# Patient Record
Sex: FEMALE | Race: BLACK OR AFRICAN AMERICAN | NOT HISPANIC OR LATINO | Employment: PART TIME | URBAN - METROPOLITAN AREA
[De-identification: names, ages, dates, MRNs, and addresses within clinical notes are randomized per-mention and may not be internally consistent; named-entity substitution may affect disease eponyms.]

---

## 2019-01-27 ENCOUNTER — OFFICE VISIT (OUTPATIENT)
Dept: URGENT CARE | Facility: URGENT CARE | Age: 24
End: 2019-01-27
Payer: COMMERCIAL

## 2019-01-27 VITALS
OXYGEN SATURATION: 99 % | WEIGHT: 94.5 LBS | TEMPERATURE: 97.2 F | DIASTOLIC BLOOD PRESSURE: 72 MMHG | SYSTOLIC BLOOD PRESSURE: 105 MMHG | HEART RATE: 92 BPM

## 2019-01-27 DIAGNOSIS — Z3A.08 8 WEEKS GESTATION OF PREGNANCY: Primary | ICD-10-CM

## 2019-01-27 DIAGNOSIS — R11.2 NAUSEA AND VOMITING, INTRACTABILITY OF VOMITING NOT SPECIFIED, UNSPECIFIED VOMITING TYPE: ICD-10-CM

## 2019-01-27 DIAGNOSIS — N39.0 URINARY TRACT INFECTION WITHOUT HEMATURIA, SITE UNSPECIFIED: ICD-10-CM

## 2019-01-27 LAB
ALBUMIN UR-MCNC: NEGATIVE MG/DL
AMORPH CRY #/AREA URNS HPF: ABNORMAL /HPF
APPEARANCE UR: ABNORMAL
BACTERIA #/AREA URNS HPF: ABNORMAL /HPF
BILIRUB UR QL STRIP: NEGATIVE
COLOR UR AUTO: YELLOW
GLUCOSE UR STRIP-MCNC: NEGATIVE MG/DL
HGB UR QL STRIP: NEGATIVE
KETONES UR STRIP-MCNC: NEGATIVE MG/DL
LEUKOCYTE ESTERASE UR QL STRIP: ABNORMAL
NITRATE UR QL: NEGATIVE
PH UR STRIP: 7.5 PH (ref 5–7)
RBC #/AREA URNS AUTO: ABNORMAL /HPF
SOURCE: ABNORMAL
SP GR UR STRIP: 1.01 (ref 1–1.03)
UROBILINOGEN UR STRIP-ACNC: 0.2 EU/DL (ref 0.2–1)
WBC #/AREA URNS AUTO: ABNORMAL /HPF

## 2019-01-27 PROCEDURE — 81001 URINALYSIS AUTO W/SCOPE: CPT | Performed by: PHYSICIAN ASSISTANT

## 2019-01-27 PROCEDURE — 87086 URINE CULTURE/COLONY COUNT: CPT | Performed by: PHYSICIAN ASSISTANT

## 2019-01-27 PROCEDURE — 99204 OFFICE O/P NEW MOD 45 MIN: CPT | Performed by: PHYSICIAN ASSISTANT

## 2019-01-27 RX ORDER — ONDANSETRON 4 MG/1
4 TABLET, FILM COATED ORAL EVERY 8 HOURS PRN
Qty: 12 TABLET | Refills: 0 | Status: SHIPPED | OUTPATIENT
Start: 2019-01-27 | End: 2019-02-06

## 2019-01-27 RX ORDER — CEFDINIR 300 MG/1
300 CAPSULE ORAL 2 TIMES DAILY
Qty: 14 CAPSULE | Refills: 0 | Status: SHIPPED | OUTPATIENT
Start: 2019-01-27 | End: 2019-02-06

## 2019-01-27 ASSESSMENT — PAIN SCALES - GENERAL: PAINLEVEL: MODERATE PAIN (4)

## 2019-01-28 NOTE — PROGRESS NOTES
SUBJECTIVE:  Chief Complaint   Patient presents with     Urgent Care     vomiting, dizziness and no appetite for almost one month.      Urgent Care     lower abdominal pain and noticed some blood in sputum for three days. Pt reports she is two months pregnant.      Olive Lima is a 23 year old female whose symptoms began nausea, vomiting and dizziness ago and include hx of UTI.    Symptoms are moderate .    Aggravating factors: hx of dizziness.    Alleviating factors:rest, fluids,   Associated symptoms:  Pain: mild dysuria  Fever: no fever  Appetite: decreaesd  Risk factors: nausea from pregnancy    Patient already had an ultrasound    PMH  Pregnancy    ALLERGIE  No Known Allergies     Social History     Tobacco Use     Smoking status: Never Smoker     Smokeless tobacco: Never Used   Substance Use Topics     Alcohol use: Not on file     Family HX  Anxiety  Allergies  GERD  HTN      ROS:  CONSTITUTIONAL:NEGATIVE for fever, chills, change in weight  INTEGUMENTARY/SKIN: NEGATIVE for worrisome rashes, moles or lesions  EYES: NEGATIVE for vision changes or irritation  ENT/MOUTH: NEGATIVE for ear, mouth and throat problems  RESP:NEGATIVE for significant cough or SOB  CV: NEGATIVE for chest pain, palpitations or peripheral edema  GI: POSITIVE for nausea and vomiting  : POSITIVE for dysuria and frequency  MUSCULOSKELETAL: NEGATIVE for significant arthralgias or myalgia  NEURO: NEGATIVE for weakness, dizziness or paresthesias    OBJECTIVE:  /72   Pulse 92   Temp 97.2  F (36.2  C) (Oral)   Wt 42.9 kg (94 lb 8 oz)   SpO2 99%   GENERAL APPEARANCE: healthy, alert and no distress  EYES: EOMI,  PERRL, conjunctiva clear  HENT: ear canals and TM's normal.  Nose and mouth without ulcers, erythema or lesions  NECK: supple, nontender, no lymphadenopathy  RESP: lungs clear to auscultation - no rales, rhonchi or wheezes  CV: regular rates and rhythm, normal S1 S2, no murmur noted  ABDOMEN:  soft, nontender, no HSM or  masses and bowel sounds normal  Extremities: no peripheral edema or tenderness, peripheral pulses normal  MS: extremities normal- no gross deformities noted, no erythema, FROM noted in all extremities  NEURO: Normal strength and tone, sensory exam grossly normal,  normal speech and mentation  SKIN: no suspicious lesions or rashes    Results for orders placed or performed in visit on 01/27/19   UA with Microscopic reflex to Culture   Result Value Ref Range    Color Urine Yellow     Appearance Urine Cloudy     Glucose Urine Negative NEG^Negative mg/dL    Bilirubin Urine Negative NEG^Negative    Ketones Urine Negative NEG^Negative mg/dL    Specific Gravity Urine 1.015 1.003 - 1.035    pH Urine 7.5 (H) 5.0 - 7.0 pH    Protein Albumin Urine Negative NEG^Negative mg/dL    Urobilinogen Urine 0.2 0.2 - 1.0 EU/dL    Nitrite Urine Negative NEG^Negative    Blood Urine Negative NEG^Negative    Leukocyte Esterase Urine Trace (A) NEG^Negative    Source Midstream Urine     WBC Urine 0 - 5 OTO5^0 - 5 /HPF    RBC Urine O - 2 OTO2^O - 2 /HPF    Bacteria Urine Few (A) NEG^Negative /HPF    Amorphous Crystals Moderate (A) NEG^Negative /HPF       ASSESSMENT/PLAN      ICD-10-CM    1. 8 weeks gestation of pregnancy Z3A.08 ondansetron (ZOFRAN) 4 MG tablet   2. Nausea and vomiting, intractability of vomiting not specified, unspecified vomiting type R11.2 UA with Microscopic reflex to Culture   3. Urinary tract infection without hematuria, site unspecified N39.0 Urine Culture Aerobic Bacterial     cefdinir (OMNICEF) 300 MG capsule       Encounter Diagnoses   Name Primary?     8 weeks gestation of pregnancy Yes     Nausea and vomiting, intractability of vomiting not specified, unspecified vomiting type      Urinary tract infection without hematuria, site unspecified        PLAN:  Diet: dilute gatorade, BRAT diet, advance diet as tolerated and small amounts clear fluids frequently,soups,juices,water,advance diet as tolerated  Orders Placed This  Encounter     UA with Microscopic reflex to Culture     ondansetron (ZOFRAN) 4 MG tablet     cefdinir (OMNICEF) 300 MG capsule       Urine culture pending  Patient needs to follow up with GYN  Follow-up with PCP if not improving

## 2019-01-29 LAB
BACTERIA SPEC CULT: NORMAL
SPECIMEN SOURCE: NORMAL

## 2019-02-06 ENCOUNTER — HOSPITAL ENCOUNTER (EMERGENCY)
Facility: CLINIC | Age: 24
Discharge: HOME | End: 2019-02-06
Attending: EMERGENCY MEDICINE | Admitting: EMERGENCY MEDICINE
Payer: COMMERCIAL

## 2019-02-06 ENCOUNTER — PRENATAL OFFICE VISIT (OUTPATIENT)
Dept: OBGYN | Facility: CLINIC | Age: 24
End: 2019-02-06
Payer: COMMERCIAL

## 2019-02-06 ENCOUNTER — APPOINTMENT (OUTPATIENT)
Dept: ULTRASOUND IMAGING | Facility: CLINIC | Age: 24
End: 2019-02-06
Attending: EMERGENCY MEDICINE
Payer: COMMERCIAL

## 2019-02-06 ENCOUNTER — ALLIED HEALTH/NURSE VISIT (OUTPATIENT)
Dept: NURSING | Facility: CLINIC | Age: 24
End: 2019-02-06
Payer: COMMERCIAL

## 2019-02-06 VITALS
RESPIRATION RATE: 12 BRPM | TEMPERATURE: 97.9 F | DIASTOLIC BLOOD PRESSURE: 68 MMHG | SYSTOLIC BLOOD PRESSURE: 110 MMHG | HEART RATE: 85 BPM | OXYGEN SATURATION: 100 %

## 2019-02-06 VITALS
HEART RATE: 108 BPM | DIASTOLIC BLOOD PRESSURE: 60 MMHG | BODY MASS INDEX: 14.2 KG/M2 | HEIGHT: 67 IN | WEIGHT: 90.5 LBS | SYSTOLIC BLOOD PRESSURE: 98 MMHG

## 2019-02-06 VITALS — BODY MASS INDEX: 15.13 KG/M2 | WEIGHT: 95.19 LBS | DIASTOLIC BLOOD PRESSURE: 70 MMHG | SYSTOLIC BLOOD PRESSURE: 100 MMHG

## 2019-02-06 DIAGNOSIS — R81 GLUCOSE FOUND IN URINE ON EXAMINATION: Primary | ICD-10-CM

## 2019-02-06 DIAGNOSIS — Z34.00 SUPERVISION OF NORMAL FIRST PREGNANCY: Primary | ICD-10-CM

## 2019-02-06 DIAGNOSIS — O21.0 HYPEREMESIS GRAVIDARUM: ICD-10-CM

## 2019-02-06 DIAGNOSIS — E87.6 HYPOKALEMIA: ICD-10-CM

## 2019-02-06 LAB
ALBUMIN SERPL-MCNC: 4.3 G/DL (ref 3.4–5)
ALBUMIN UR-MCNC: NEGATIVE MG/DL
ALP SERPL-CCNC: 52 U/L (ref 40–150)
ALT SERPL W P-5'-P-CCNC: 16 U/L (ref 0–50)
ANION GAP SERPL CALCULATED.3IONS-SCNC: 13 MMOL/L (ref 3–14)
APPEARANCE UR: CLEAR
AST SERPL W P-5'-P-CCNC: 15 U/L (ref 0–45)
BILIRUB SERPL-MCNC: 0.5 MG/DL (ref 0.2–1.3)
BILIRUB UR QL STRIP: NEGATIVE
BUN SERPL-MCNC: 9 MG/DL (ref 7–30)
CALCIUM SERPL-MCNC: 8.9 MG/DL (ref 8.5–10.1)
CHLORIDE SERPL-SCNC: 101 MMOL/L (ref 94–109)
CO2 SERPL-SCNC: 20 MMOL/L (ref 20–32)
COLOR UR AUTO: ABNORMAL
CREAT SERPL-MCNC: 0.52 MG/DL (ref 0.52–1.04)
ERYTHROCYTE [DISTWIDTH] IN BLOOD BY AUTOMATED COUNT: 11.8 % (ref 10–15)
GFR SERPL CREATININE-BSD FRML MDRD: >90 ML/MIN/{1.73_M2}
GLUCOSE SERPL-MCNC: 92 MG/DL (ref 70–99)
GLUCOSE UR STRIP-MCNC: >499 MG/DL
HCT VFR BLD AUTO: 39.1 % (ref 35–47)
HGB BLD-MCNC: 13.6 G/DL (ref 11.7–15.7)
HGB UR QL STRIP: NEGATIVE
KETONES UR STRIP-MCNC: 20 MG/DL
LEUKOCYTE ESTERASE UR QL STRIP: NEGATIVE
LIPASE SERPL-CCNC: 246 U/L (ref 73–393)
MCH RBC QN AUTO: 27.8 PG (ref 26.5–33)
MCHC RBC AUTO-ENTMCNC: 34.8 G/DL (ref 31.5–36.5)
MCV RBC AUTO: 80 FL (ref 78–100)
NITRATE UR QL: NEGATIVE
PH UR STRIP: 6 PH (ref 5–7)
PLATELET # BLD AUTO: 197 10E9/L (ref 150–450)
POTASSIUM SERPL-SCNC: 2.9 MMOL/L (ref 3.4–5.3)
PROT SERPL-MCNC: 8.1 G/DL (ref 6.8–8.8)
RBC # BLD AUTO: 4.9 10E12/L (ref 3.8–5.2)
RBC #/AREA URNS AUTO: <1 /HPF (ref 0–2)
SODIUM SERPL-SCNC: 134 MMOL/L (ref 133–144)
SOURCE: ABNORMAL
SP GR UR STRIP: 1.01 (ref 1–1.03)
SQUAMOUS #/AREA URNS AUTO: 2 /HPF (ref 0–1)
UROBILINOGEN UR STRIP-MCNC: 0 MG/DL (ref 0–2)
WBC # BLD AUTO: 4.3 10E9/L (ref 4–11)
WBC #/AREA URNS AUTO: 1 /HPF (ref 0–5)

## 2019-02-06 PROCEDURE — 96361 HYDRATE IV INFUSION ADD-ON: CPT

## 2019-02-06 PROCEDURE — 81001 URINALYSIS AUTO W/SCOPE: CPT | Performed by: EMERGENCY MEDICINE

## 2019-02-06 PROCEDURE — 25000128 H RX IP 250 OP 636: Performed by: EMERGENCY MEDICINE

## 2019-02-06 PROCEDURE — 96375 TX/PRO/DX INJ NEW DRUG ADDON: CPT

## 2019-02-06 PROCEDURE — 99207 ZZC NO CHARGE NURSE ONLY: CPT

## 2019-02-06 PROCEDURE — 83690 ASSAY OF LIPASE: CPT | Performed by: EMERGENCY MEDICINE

## 2019-02-06 PROCEDURE — 99203 OFFICE O/P NEW LOW 30 MIN: CPT | Performed by: OBSTETRICS & GYNECOLOGY

## 2019-02-06 PROCEDURE — 25000131 ZZH RX MED GY IP 250 OP 636 PS 637: Performed by: EMERGENCY MEDICINE

## 2019-02-06 PROCEDURE — 25800025 ZZH RX 258: Performed by: EMERGENCY MEDICINE

## 2019-02-06 PROCEDURE — 76801 OB US < 14 WKS SINGLE FETUS: CPT

## 2019-02-06 PROCEDURE — 99285 EMERGENCY DEPT VISIT HI MDM: CPT | Mod: 25

## 2019-02-06 PROCEDURE — 25000132 ZZH RX MED GY IP 250 OP 250 PS 637: Performed by: EMERGENCY MEDICINE

## 2019-02-06 PROCEDURE — 96365 THER/PROPH/DIAG IV INF INIT: CPT

## 2019-02-06 PROCEDURE — 85027 COMPLETE CBC AUTOMATED: CPT | Performed by: EMERGENCY MEDICINE

## 2019-02-06 PROCEDURE — 80053 COMPREHEN METABOLIC PANEL: CPT | Performed by: EMERGENCY MEDICINE

## 2019-02-06 RX ORDER — POTASSIUM CHLORIDE 1500 MG/1
40 TABLET, EXTENDED RELEASE ORAL ONCE
Status: COMPLETED | OUTPATIENT
Start: 2019-02-06 | End: 2019-02-06

## 2019-02-06 RX ORDER — PROMETHAZINE HYDROCHLORIDE 25 MG/1
25 SUPPOSITORY RECTAL EVERY 6 HOURS PRN
Qty: 24 SUPPOSITORY | Refills: 1 | Status: SHIPPED | OUTPATIENT
Start: 2019-02-06 | End: 2019-07-19

## 2019-02-06 RX ORDER — ONDANSETRON 4 MG/1
8 TABLET, ORALLY DISINTEGRATING ORAL ONCE
Status: COMPLETED | OUTPATIENT
Start: 2019-02-06 | End: 2019-02-06

## 2019-02-06 RX ORDER — MECLIZINE HYDROCHLORIDE 25 MG/1
25 TABLET ORAL 3 TIMES DAILY PRN
Qty: 25 TABLET | Refills: 1 | Status: SHIPPED | OUTPATIENT
Start: 2019-02-06 | End: 2019-07-19

## 2019-02-06 RX ORDER — PYRIDOXINE HCL (VITAMIN B6) 25 MG
25 TABLET ORAL DAILY
Qty: 30 TABLET | Refills: 0 | Status: SHIPPED | OUTPATIENT
Start: 2019-02-06 | End: 2019-07-19

## 2019-02-06 RX ORDER — METOCLOPRAMIDE HYDROCHLORIDE 5 MG/ML
10 INJECTION INTRAMUSCULAR; INTRAVENOUS ONCE
Status: COMPLETED | OUTPATIENT
Start: 2019-02-06 | End: 2019-02-06

## 2019-02-06 RX ORDER — DIPHENHYDRAMINE HYDROCHLORIDE 50 MG/ML
25 INJECTION INTRAMUSCULAR; INTRAVENOUS ONCE
Status: COMPLETED | OUTPATIENT
Start: 2019-02-06 | End: 2019-02-06

## 2019-02-06 RX ORDER — METOCLOPRAMIDE 10 MG/1
10 TABLET ORAL 4 TIMES DAILY PRN
Qty: 20 TABLET | Refills: 0 | Status: SHIPPED | OUTPATIENT
Start: 2019-02-06 | End: 2019-07-19

## 2019-02-06 RX ORDER — POTASSIUM CL/LIDO/0.9 % NACL 10MEQ/0.1L
10 INTRAVENOUS SOLUTION, PIGGYBACK (ML) INTRAVENOUS
Status: DISCONTINUED | OUTPATIENT
Start: 2019-02-06 | End: 2019-02-06 | Stop reason: HOSPADM

## 2019-02-06 RX ORDER — POTASSIUM CHLORIDE 1.5 G/1.58G
20 POWDER, FOR SOLUTION ORAL 2 TIMES DAILY
Qty: 8 PACKET | Refills: 0 | Status: SHIPPED | OUTPATIENT
Start: 2019-02-06 | End: 2019-07-19

## 2019-02-06 RX ADMIN — METOCLOPRAMIDE 10 MG: 5 INJECTION, SOLUTION INTRAMUSCULAR; INTRAVENOUS at 09:42

## 2019-02-06 RX ADMIN — Medication 10 MEQ: at 11:57

## 2019-02-06 RX ADMIN — POTASSIUM CHLORIDE 40 MEQ: 1500 TABLET, EXTENDED RELEASE ORAL at 12:03

## 2019-02-06 RX ADMIN — SODIUM CHLORIDE 1000 ML: 9 INJECTION, SOLUTION INTRAVENOUS at 09:41

## 2019-02-06 RX ADMIN — DEXTROSE AND SODIUM CHLORIDE 1000 ML: 5; 450 INJECTION, SOLUTION INTRAVENOUS at 09:49

## 2019-02-06 RX ADMIN — ONDANSETRON 8 MG: 4 TABLET, ORALLY DISINTEGRATING ORAL at 13:56

## 2019-02-06 RX ADMIN — DIPHENHYDRAMINE HYDROCHLORIDE 25 MG: 50 INJECTION, SOLUTION INTRAMUSCULAR; INTRAVENOUS at 09:41

## 2019-02-06 SDOH — HEALTH STABILITY: MENTAL HEALTH: HOW OFTEN DO YOU HAVE A DRINK CONTAINING ALCOHOL?: NEVER

## 2019-02-06 ASSESSMENT — ENCOUNTER SYMPTOMS
DIZZINESS: 1
ABDOMINAL PAIN: 1
NAUSEA: 1
VOMITING: 1

## 2019-02-06 ASSESSMENT — MIFFLIN-ST. JEOR: SCORE: 1185.2

## 2019-02-06 NOTE — PROGRESS NOTES
SUBJECTIVE:                                                   Olive Lima is a 23 year old female who presents to clinic today for the following health issue(s):  Patient presents with:  Prenatal Care: possibly needs infusions for severe hyperemesis     present: sana Rosenbaum, from [place]    HPI:  Patient 8w6d today based on LMP confirmed by ultrasound in ED today. She had her OB nursing intake visit this AM.   Was seen in the ED today for hyperemesis. Lipase, CBC, and CMP within normal limits with exception of Potassium (2.9).   States she is unable to hold food down. Able to take some sips of water. Vomits ~5x/day.   is picking up her prescriptions from the ED (doxylamine, pyridoxine, Reglan).   Patient has concerns with taking oral medication given her nausea.       Problem list and histories reviewed & adjusted, as indicated.  Additional history: as documented.    There is no problem list on file for this patient.    Past Surgical History:   Procedure Laterality Date     NO HISTORY OF SURGERY        Social History     Tobacco Use     Smoking status: Never Smoker     Smokeless tobacco: Never Used   Substance Use Topics     Alcohol use: No     Frequency: Never             No current outpatient medications on file prior to visit.  No current facility-administered medications on file prior to visit.   No Known Allergies    ROS:  5 point ROS negative except as noted above in HPI, including Gen., Resp., CV, GI &  system review.    OBJECTIVE:     /70   Wt 43.2 kg (95 lb 3 oz)   LMP 12/06/2018 (Exact Date)   BMI 15.13 kg/m     BMI: Body mass index is 15.13 kg/m .  General: Alert and oriented, no distress. Very thin appearing  Psychiatric: Mood and affect within normal limits.  Skin: Warm and dry, no lesions, rashes or discolorations.  Musculoskeletal: extremities normal    In-Clinic Test Results:  Results for orders placed or performed during the hospital encounter of 02/06/19  (from the past 24 hour(s))   CBC (platelets, no diff)   Result Value Ref Range    WBC 4.3 4.0 - 11.0 10e9/L    RBC Count 4.90 3.8 - 5.2 10e12/L    Hemoglobin 13.6 11.7 - 15.7 g/dL    Hematocrit 39.1 35.0 - 47.0 %    MCV 80 78 - 100 fl    MCH 27.8 26.5 - 33.0 pg    MCHC 34.8 31.5 - 36.5 g/dL    RDW 11.8 10.0 - 15.0 %    Platelet Count 197 150 - 450 10e9/L   Comprehensive metabolic panel   Result Value Ref Range    Sodium 134 133 - 144 mmol/L    Potassium 2.9 (L) 3.4 - 5.3 mmol/L    Chloride 101 94 - 109 mmol/L    Carbon Dioxide 20 20 - 32 mmol/L    Anion Gap 13 3 - 14 mmol/L    Glucose 92 70 - 99 mg/dL    Urea Nitrogen 9 7 - 30 mg/dL    Creatinine 0.52 0.52 - 1.04 mg/dL    GFR Estimate >90 >60 mL/min/[1.73_m2]    GFR Estimate If Black >90 >60 mL/min/[1.73_m2]    Calcium 8.9 8.5 - 10.1 mg/dL    Bilirubin Total 0.5 0.2 - 1.3 mg/dL    Albumin 4.3 3.4 - 5.0 g/dL    Protein Total 8.1 6.8 - 8.8 g/dL    Alkaline Phosphatase 52 40 - 150 U/L    ALT 16 0 - 50 U/L    AST 15 0 - 45 U/L   Lipase   Result Value Ref Range    Lipase 246 73 - 393 U/L   US OB < 14 Weeks Single    Narrative    ULTRASOUND OBSTETRIC LESS THAN FOURTEEN WEEKS SINGLE  2/6/2019 10:29  AM    HISTORY: Low abdominal pain during early pregnancy. Nausea and  vomiting.    TECHNIQUE: Transabdominal imaging only was performed.    COMPARISON:  None.    FINDINGS:    Estimated gestational age by current ultrasound measurement: 9 weeks 4  days.  Estimated date of delivery based on this ultrasound: 7/9/2019.  Patient reported LMP: 12/6/2018.  Estimated gestational age by reported LMP: 8 weeks 6 days.    Crown-rump length: 2.8 cm.   Embryonic cardiac activity: 179 bpm.   Yolk sac: Present.  Subchorionic hemorrhage: None.    Right ovary: Not visualized.  Left ovary: Not visualized.  Adnexal mass: None.  Free pelvic fluid: None.      Impression    IMPRESSION: Single live intrauterine pregnancy of estimated  gestational age 9 weeks 4 days.    NOLVIA DAVIS MD   UA with  Microscopic   Result Value Ref Range    Color Urine Straw     Appearance Urine Clear     Glucose Urine >499 (A) NEG^Negative mg/dL    Bilirubin Urine Negative NEG^Negative    Ketones Urine 20 (A) NEG^Negative mg/dL    Specific Gravity Urine 1.015 1.003 - 1.035    Blood Urine Negative NEG^Negative    pH Urine 6.0 5.0 - 7.0 pH    Protein Albumin Urine Negative NEG^Negative mg/dL    Urobilinogen mg/dL 0.0 0.0 - 2.0 mg/dL    Nitrite Urine Negative NEG^Negative    Leukocyte Esterase Urine Negative NEG^Negative    Source Unspecified Urine     WBC Urine 1 0 - 5 /HPF    RBC Urine <1 0 - 2 /HPF    Squamous Epithelial /HPF Urine 2 (H) 0 - 1 /HPF       ASSESSMENT/PLAN:                                                        ICD-10-CM    1. Glucose found in urine on examination R81 Hemoglobin A1c   2. Hyperemesis gravidarum O21.0 promethazine (PHENERGAN) 25 MG suppository     meclizine (ANTIVERT) 25 MG tablet   3. BMI less than 19,adult Z68.1        Noted that she is spilling ketones and glucose in her urine collected in the ED. Will obtain A1c with prenatal labs.  Consider early glucola when no longer vomiting.    [place] pregnancy base on ultrasound in ED today.  Recommend trying Phenergen 25mg suppository every 6 hours rather than oral medications. Also provided Meclizine for the evening. Okay to try Reglan however would not take Reglan and Phenergan together. This was discussed.   In the ED, she was started on Potassium chloride 20 mEq, Oral, 2 TIMES DAILY -- continue.    Will call patient tomorrow to assess effectiveness of current plan.   Will set her up with the infusion center for IV fluids and electrolyte replacement, IV antiemetics if not improved.  ----------------------------------------    Update: Patient called. She is improved on Phenergan suppositories and would like to hold on infusions at this time.     Next appointment 2/21. Patient to call if continues to have emesis, inability to hold down fluids.      Keren Hedrick, [facility]

## 2019-02-06 NOTE — PATIENT INSTRUCTIONS
Early Pregnancy Information:     Rest  Your body requires more sleep in early pregnancy. Try to get plenty of sleep at night or take a short nap during the day. Being tired can often trigger nausea. If your nausea is worse in the evening, try taking a nap before dinner.     Exercise  Energy levels are normally low in early pregnancy and exercise may be the last thing on your mind, but getting out and walking briskly for 30 minutes each day will increase metabolism, relieve stress and psychologically improve your outlook. Walking after meals can improve heartburn, constipation, and indigestion.   - You can generally continue the same exercise routine in early pregnancy that you were doing prior to pregnancy. If you were not getting daily exercise before, now is a great time to start by walking or biking for 30 minutes daily.  - Any exercise that causes cramping, bleeding, or pain needs to be stopped right away. Please report to your doctor.     [place]  It is safe to continue sexual activity in pregnancy. If you experience bleeding or pain with intercourse, please abstain until you are able to discuss this with your doctor.         Nausea & Vomiting  Women experience this problem in varying degrees during pregnancy and you may have different experiences in subsequent pregnancies. Some women experience  morning sickness,  but it is also common to experience nausea any time throughout the day.  Listen to your body and eat the kinds of foods that make you feel best.  Suggestions for Diet    The most important rule is to eat small amounts often - even if you are not hungry. Try not to go more than three hours without eating during the day or ten hours at night. An empty stomach triggers nausea.     Eat slowly and avoid foods that are spicy or high in fat. These are difficult to digest. Do not overfill your stomach.     Drink fruit juices, water and milk between meals.     Eat a few crackers, dry toast or vanilla  wafers before getting out of bed in the morning. Stay in bed 15-20 minutes after eating.    Give yourself extra time in the morning.     Do not brush your teeth until you have been up for a while.     Do not skip breakfast.     Have a snack at bedtime that includes both carbohydrates and protein, e.g., peanut butter toast or hot chocolate made with milk.    A specific food or drink may trigger nausea in one woman and alleviate it in another. Find out what works best for you and eliminate the foods that cause nausea.     Most women tolerate ice cold drinks and foods best. Sherbet and fruit juices are good examples.     Try avoid coffee and products containing caffeine; it increases stomach acid. About 1 cup of coffee daily is considered safe in pregnancy, but this may be triggering your nausea.    Avoid smoking: it also increases stomach acid.     Vitamins    Vitamins B6 and Vitamin C may help improve nausea. There have been no definite studies to prove this effective, but some women do improve.     To prevent nausea take: 25 mg of Vitamin B6 daily. You can take this up to 3 times daily. You may have to cut a tablet in half to get to 25mg     Take: 500 mg. Vitamin C daily.     Yogurt is a good source of the B Vitamins.     If taking your prenatal vitamin increases or causes nausea, stop for 7-10 days, then try again. You can also try switching to a formulation without iron in early pregnancy (a women's once daily vitamin).   Medication and other remedies    Unisom, taken as directed, may be used with Vitamin B6. Take 25mg of unisom at night, this can make you drowsy.     [place] tablets, 250 mg, 2-4 times per day     Acupressure Wrist Bands (Sea Bands)     Peppermint or [place]ol[place] decaffeinated tea     Peppermint gum or candy  Inform your doctor if:    You cannot keep any solid food down for 24 hours.     You cannot keep liquids down.     You are losing weight.     You are running a temperature greater than 100.4   F.     Common Ailments:  Below is a list of medications that are safe to take in pregnancy. This is not everything that is safe, but merely a list of over the counter options to get you through frequently experienced symptoms.      Upper Respiratory Infections:  Sinus congestion and colds - Plain Sudafed, Tylenol Sinus  Antihistamines -  Claritin, Benadryl, Zyrtec  Avoid nasal sprays, except for Saline only.  Sore Throat:  Lozenges - Cepacol, Chloraseptic  Cough drops - Halls, Vicks, or lemon drops     Headache, Pain, or Fever:  Tylenol or other acetaminophen products: 650-1000 mg every 6-8 hours (up to 3 gm/24 hours) as needed.   A single serving of caffeine   Increase fluid consumption.  Try a short nap. If not relieved, call the office.      Heartburn:  Sodium-free antacids - Gaviscon, Maalox, Mylanta, Tums  Acid Reflux - Prilosec, Zantac 75 mg 2 times daily  Gas: Simethicone products - Gas-X     Constipation:  Fiber supplements - Fibercon, Metamucil (powder, capsules, or wafers)  Stool softeners - Colace (Docusate Sodium),   Laxatives -Milk of Magnesia, Senna, Miralax  Diarrhea:  Imodium, Imodium AD  Avoid dairy products and stop prenatal vitamins until diarrhea subsides. If not resolved in 24-36 hours, call the office.     Insect Bites and Rashes:  Lotions - Calamine, Caladryl, Hydrocortisone 1%, DEET bug spray  Sprays - DEET bug spray  If rash is unusual or persists, call the office.     Hemorrhoids:  Preparation H, Anusol, Tucks pads        Call the clinic ([facility] [phone], [place] [phone]) right away with any of the following concerns. These phones are answered at all hours:     1.   Severe abdominal pain or cramping, that does not go away with rest and hydration     2.   Vaginal bleeding.        Continue your prenatal vitamins daily.

## 2019-02-06 NOTE — ED TRIAGE NOTES
Patient presents with nausea and vomiting for about a month. Patient is 8 weeks pregnant, hasn't been able to keep anything down, slight abdominal cramping and dizziness. ABCDs intact, alert and oriented x 4.

## 2019-02-06 NOTE — NURSING NOTE
"Chief Complaint   Patient presents with     Prenatal Care     possibly needs infusions for severe hyperemesis       Initial /70   Wt 43.2 kg (95 lb 3 oz)   LMP 2018 (Exact Date)   BMI 15.13 kg/m   Estimated body mass index is 15.13 kg/m  as calculated from the following:    Height as of an earlier encounter on 19: 1.689 m (5' 6.5\").    Weight as of this encounter: 43.2 kg (95 lb 3 oz).  BP completed using cuff size: regular    Questioned patient about current smoking habits.  Pt. has never smoked.          The following HM Due: NONE    Severe hyperemesis, cannot eat or drink anything.    Modesta Rosa, CMA      "

## 2019-02-06 NOTE — PROGRESS NOTES
Pt attended a NPN one on one nurse visit.  Pt is , 8w6d GA based off of LMP.  Pt is here with her  who is interpreting.  She is clearly feeling nauseous and states she has not been able to keep food or fluids down since she found out she was pregnant.  She states she feels dizzy and fatigued.   said it has been getting worse despite the use of zofran from the urgent care.  I did recommend that she report to the ED for IV fluids.  They want to see a doctor today as her  has the day off and can drive. I suspect she may have order for more IV fluids from an MD.  Added to schedule for this afternoon, they will call if they are still at the ED.    Pt's occupation: homemaker  Pt c/o: N&V, dizziness   Advised: ED  Prev hx of : none  Hx of pregnancy complications: n/a  Hx of delivery complications: N/a    They are unsure of last pap, it sounds like she may have never had one before.    Hx of abnml pap: none    Went over all information as listed in checklist for 6-12 weeks as well as clinic info and signs/sx to call for.     Dianna MEDLEY R.N.  [facility]

## 2019-02-06 NOTE — ED PROVIDER NOTES
History     Chief Complaint:  Nausea & Vomiting      A  was used.      Olive Lima is a 23 year old female , approximately 8 weeks gestation, who presents with her significant other for evaluation of nausea and vomiting and some abdominal pain. . The patient reports that she started to feel dizziness with this pregnancy. She states that the last day of her menstrual cycle was in the first week of December. She states that she vomits every time she eats and she vomits a lot approximating about 5 a day. She is unable to keep any fluids down at home. The patient has been taking Zofran which has not helped. She is currently feeling dizzy, but also complains of some diffuse upper abdominal pain. The pain is related to the nausea and vomiting.  She has only seen a Gynecologist once but the family have their contact. She had an Ultrasound done in [place] a few months ago but it is uncertain whether this was done prior to this pregnancy.    Allergies:  No known drug allergies.     Medications:    The patient is currently on no regular medications.     Past Medical History:    History reviewed.  No significant past medical history.      Past Surgical History:    History reviewed. No pertinent past surgical history.     Family History:    History reviewed. No pertinent family history.     Social History:  Marital Status:  Single   Smoking status: No   Alcohol use: No          Review of Systems   Gastrointestinal: Positive for abdominal pain, nausea and vomiting.   Neurological: Positive for dizziness.   All other systems reviewed and are negative.      Physical Exam     Vital signs  Patient Vitals for the past 24 hrs:   BP Temp Temp src Pulse Heart Rate Resp SpO2   19 1230 110/68 -- -- 85 -- -- 100 %   19 1215 108/68 -- -- 85 -- -- 100 %   19 1200 119/80 -- -- 85 -- -- 100 %   19 1145 119/85 -- -- 75 -- -- 100 %   19 1130 119/87 -- -- 83 -- -- 100 %   19 1000  114/79 -- -- 79 -- -- 100 %   02/06/19 0945 -- -- -- 107 -- -- 99 %   02/06/19 0930 107/85 -- -- 90 -- -- 98 %   02/06/19 0915 108/77 -- -- 86 -- -- 99 %   02/06/19 0905 116/79 97.9  F (36.6  C) Oral -- 134 12 99 %          Physical Exam    Constitutional:  Pleasant, age appropriate.       Resting comfortably in the bed.  HEENT:    Oropharynx is moist  Eyes:    Conjunctiva normal  Neck:    Supple, no meningismus.     CV:     Regular rate and rhythm.      No murmurs, rubs or gallops.     No lower extremity edema.  PULM:    Clear to auscultation bilateral.       No respiratory distress.      Good air exchange.  ABD:    Soft, non-distended.       Mild tenderness in the epigastrium.     Uterine fundus not palpable     Bowel sounds normal.     No pulsatile masses.       No rebound, guarding or rigidity.     No CVA tenderness.   MSK:     No gross deformity to all four extremities.   LYMPH:   No cervical lymphadenopathy.  NEURO:   Alert.  Good muscular tone, no atrophy.   Skin:    Warm, dry and intact.    Psych:    Mood is good and affect is appropriate.      Emergency Department Course   Imaging:  US OB < 14 Weeks Single   Final Result   IMPRESSION: Single live intrauterine pregnancy of estimated   gestational age 9 weeks 4 days.      NOLVIA DAVIS MD      Results as read by radiology.   I communicated the results of the imaging studies with the patient who expressed understanding of these findings.      Laboratory:  UA: Urine Glc >499, Urine [place]eton 20, Squamous 2, otherwise within normal limits   CBC: WNL   CMP: K 2.9, otherwise within normal limits   Lipase: 246     Interventions:  0941: Benadryl 25 mg  0942: Reglan 10 mg   0942: NS 1L IV Bolus   0949: D50%   1157: KCl 10mEq  1203: KCl 40 mEq  1315: KCl 40 mEq       Emergency Department Course:  Past medical records, nursing notes, and vitals reviewed.  0920: I performed an exam of the patient and obtained history, as documented above.       The patient was sent for  an Ultra Sound while in the emergency department, findings above.    IV inserted and blood drawn for the above work up to be conducted.     1330: Rechecked the patient, findings and plan explained to the patient. Patient discharged home, status improved, with instructions regarding supportive care, medications, and reasons to return as well as the importance of close follow-up was reviewed.    Impression & Plan      Medical Decision Makin-year-old female  at 8 weeks presents the ED with primary complaints of nausea and vomiting.  Findings are consistent with hyperemesis gravidarum.  She has evidence of hypokalemia for which she was given IV and oral supplementation.  After antiemetics and IV fluids, she feels remarkably improved.  She is able to tolerate a p.o. challenge.  Patient will be discharged home on pyridoxine, doxylamine and Reglan.  Close follow-up with OB/GYN physician for further evaluation and management.    She had mild associated upper abdominal discomfort.  Again laboratory studies are reassuring.  No concerning features for intra-abdominal catastrophe.  She has no features to draw concern for vaginitis, cervicitis or PID.  Abdominal discomfort is likely related to the hyperemesis gravidarum.  Patient safe for discharge home.    Diagnosis:    ICD-10-CM    1. Hyperemesis gravidarum O21.0 UA with Microscopic     UA with Microscopic   2. Hypokalemia E87.6        Disposition:  discharged to home    Discharge Medications:     Medication List      Started    doxylamine 25 MG Tabs tablet  Commonly known as:  UNISOM  25 mg, Oral, AT BEDTIME     metoclopramide 10 MG tablet  Commonly known as:  REGLAN  10 mg, Oral, 4 TIMES DAILY PRN     potassium chloride 20 MEQ packet  Commonly known as:  KLOR-CON  20 mEq, Oral, 2 TIMES DAILY     vitamin B6 25 MG tablet  Commonly known as:  pyridOXINE  25 mg, Oral, DAILY          Mateo GARRETT am serving as a scribe at 9:21 AM on 2019 to document services  personally performed by Robin Giordano MD based on my observations and the provider's statements to me.    [facility] EMERGENCY DEPARTMENT       Robin Giordano MD  02/06/19 [id number]

## 2019-02-06 NOTE — ED NOTES
Patient had an episode of vomiting after discharge, MD notified hold on discharge and give Zofran. Will continue to monitor.

## 2019-02-06 NOTE — ED AVS SNAPSHOT
[facility] Emergency Department  [address]  [facility] [address]  Phone:  [phone]  Fax:  [phone]                                    Olive Lima   MRN: [id number]    Department:  [facility] Emergency Department   Date of Visit:  2/6/2019           After Visit Summary Signature Page    I have received my discharge instructions, and my questions have been answered. I have discussed any challenges I see with this plan with the nurse or doctor.    ..........................................................................................................................................  Patient/Patient Representative Signature      ..........................................................................................................................................  Patient Representative Print Name and Relationship to Patient    ..................................................               ................................................  Date                                   Time    ..........................................................................................................................................  Reviewed by Signature/Title    ...................................................              ..............................................  Date                                               Time          22EPIC Rev 08/18

## 2019-02-06 NOTE — ED NOTES
Provided pt with water and alvin crackers for PO challenge at 1128. Pt ambulated to the bathroom. Gait steady. No complaints of dizziness or SOB.

## 2019-02-07 PROBLEM — O21.0 HYPEREMESIS GRAVIDARUM: Status: ACTIVE | Noted: 2019-02-07

## 2019-02-21 ENCOUNTER — PRENATAL OFFICE VISIT (OUTPATIENT)
Dept: OBGYN | Facility: CLINIC | Age: 24
End: 2019-02-21
Payer: COMMERCIAL

## 2019-02-21 VITALS — DIASTOLIC BLOOD PRESSURE: 56 MMHG | BODY MASS INDEX: 15.1 KG/M2 | WEIGHT: 95 LBS | SYSTOLIC BLOOD PRESSURE: 102 MMHG

## 2019-02-21 DIAGNOSIS — Z11.3 SCREEN FOR STD (SEXUALLY TRANSMITTED DISEASE): ICD-10-CM

## 2019-02-21 DIAGNOSIS — O21.0 HYPEREMESIS GRAVIDARUM: ICD-10-CM

## 2019-02-21 DIAGNOSIS — Z34.00 SUPERVISION OF NORMAL FIRST PREGNANCY, ANTEPARTUM: ICD-10-CM

## 2019-02-21 DIAGNOSIS — Z23 NEED FOR PROPHYLACTIC VACCINATION AND INOCULATION AGAINST INFLUENZA: ICD-10-CM

## 2019-02-21 DIAGNOSIS — Z12.4 SCREENING FOR MALIGNANT NEOPLASM OF CERVIX: Primary | ICD-10-CM

## 2019-02-21 PROCEDURE — 87491 CHLMYD TRACH DNA AMP PROBE: CPT | Performed by: OBSTETRICS & GYNECOLOGY

## 2019-02-21 PROCEDURE — 99000 SPECIMEN HANDLING OFFICE-LAB: CPT | Performed by: OBSTETRICS & GYNECOLOGY

## 2019-02-21 PROCEDURE — 90471 IMMUNIZATION ADMIN: CPT | Performed by: OBSTETRICS & GYNECOLOGY

## 2019-02-21 PROCEDURE — 90686 IIV4 VACC NO PRSV 0.5 ML IM: CPT | Performed by: OBSTETRICS & GYNECOLOGY

## 2019-02-21 PROCEDURE — 99214 OFFICE O/P EST MOD 30 MIN: CPT | Mod: 25 | Performed by: OBSTETRICS & GYNECOLOGY

## 2019-02-21 PROCEDURE — G0145 SCR C/V CYTO,THINLAYER,RESCR: HCPCS | Performed by: OBSTETRICS & GYNECOLOGY

## 2019-02-21 PROCEDURE — 40000791 ZZHCL STATISTIC VERIFI PRENATAL TRISOMY 21,18,13: Mod: 90 | Performed by: OBSTETRICS & GYNECOLOGY

## 2019-02-21 PROCEDURE — 36415 COLL VENOUS BLD VENIPUNCTURE: CPT | Performed by: OBSTETRICS & GYNECOLOGY

## 2019-02-21 PROCEDURE — 87591 N.GONORRHOEAE DNA AMP PROB: CPT | Performed by: OBSTETRICS & GYNECOLOGY

## 2019-02-21 NOTE — LETTER
February 26, 2019      Olive Lima  [address]  [place] [facility] [address]    Dear ,      I am happy to inform you that your recent cervical cancer screening test (PAP smear) was normal.      Preventative screenings such as this help to ensure your health for years to come. You should repeat a pap smear in 3 years, unless otherwise directed.      You will still need to return to the clinic every year for your annual exam and other preventive tests.     If you have additional questions regarding this result, please call our registered nurse, Isaura at [phone].      Sincerely,      Frantz Garcia MD/[facility]

## 2019-02-21 NOTE — PATIENT INSTRUCTIONS
You can reach your [place] Care Team any time of the day by calling [phone]. This number will put you in touch with the 24 hour nurse line if the clinic is closed.    To contact your OB/GYN Surgery Scheduler please call [phone]. This is a direct number for your care team between 8 a.m. and 4 p.m. Monday through Friday.    [facility] is open for your convenience: [phone]  Monday through Friday 8 a.m. to 8:30 p.m.  Saturday 9 a.m. to 6 p.m.  Sunday Noon to 6 p.m.    They are closed on all major holidays.

## 2019-02-21 NOTE — PROGRESS NOTES

## 2019-02-21 NOTE — PROGRESS NOTES
Olive Lima is a 23 year old  Black Portuguese female  Estimated Date of Delivery: Sep 7, 2019  who presents to the clinic for an new ob visit.    Estimated Date of Delivery: Sep 7, 2019 is calculated from Patient's last menstrual period was 2018 (exact date).   She has not had bleeding since her LMP.   She has had nausea resulting in treatment with over-the-counter anti-emetics weigh loss has not occurred.    testing including the Innatal screen, the  1st trimester screen, the quad test, the AFP test, the modified sequential test, cystic fibrosis screening and Ultrasound and the time frames for each of these were reviewed.  The patient and her  desire the innatal test    This was a planned pregnancy.   FOB is involved,     OTHER CONCERNS: None at present  INFECTION HISTORY  HIV: no  Hepatitis B: no  Hepatitis C: no  Syphilis:  no  Tuberculosis: no   PPD- no  Herpes self: no  Herpes partner:  no  Chlamydia:  no  Gonorrhea:  no  HPV: no  BV:  no  Trichomonis:  no  Chicken Pox:  YES  ====================================================  PERSONAL/SOCIAL HISTORY  Lives lives with their spouse.  Employment: Unemployed.  Her job involves light activity .  Additional items: None  =====================================================   REVIEW OF SYSTEMS  CONSTITUTIONAL: NEGATIVE for fever, chills  EYES: NEGATIVE for vision changes   RESP: NEGATIVE for significant cough or SOB  CV: NEGATIVE for chest pain, palpitations   GI: NEGATIVE for nausea, abdominal pain, heartburn, or change in bowel habits  : NEGATIVE for frequency, dysuria, or hematuria  MUSCULOSKELETAL: NEGATIVE for significant arthralgias or myalgia  NEURO: NEGATIVE for weakness, dizziness or paresthesias or headache  ====================================================  PHYSICAL EXAM:  /56   Wt 43.1 kg (95 lb)   LMP 2018 (Exact Date)   BMI 15.10 kg/m    BMI- Body mass index is 15.1 kg/m .,     RECOMMENDED  WEIGHT GAIN: 15-25 lbs.  GENERAL:  Pleasant pregnant female, alert, well groomed.  SKIN:  Warm and dry, without lesions or rashes  HEAD: Symmetrical features.  EYES:  PERRLA,   MOUTH:  Buccal mucosa pink, moist without lesions.    NECK:  Thyroid without enlargement and nodules.  Lymph nodes not palpable.   LUNGS:  Clear to auscultation.  BREAST:  Symmetrical.  No dominant, fixed or suspicious masses are noted.  No skin or nipple changes or axillary nodes.  Self exam is taught and encouraged.  Nipples everted.      HEART:  RRR without murmur.  ABDOMEN: Soft without masses , tenderness or organomegaly.  No CVA tenderness. No scars noted.. FHT not heard today  MUSCULOSKELETAL:  Full range of motion  EXTREMITIES:  No edema. No significant varicosities.   GENITALIA:  BUS WNL, no lesions noted   VAGINA:  Pink, normal rugae and discharge normal and physiologic,   CERVIX:  smooth, without discharge or CMT and nulliparous os,   firm/ closed 2 cm long.  UTERUS: Anteverted, nontender 11 weeks in size.  ADNEXA: Without masses or tenderness  PELVIS:  Arch; wide . Sacrum; deep. Spines;blunt.  Side walls; straight. Type; gynecoid  PELVIS:   Adequate, Pelvis proven to -pelvis not tested.  RECTAL:  Normal appearance.  Digital exam deferred.  WET PREP:Not done  gonorrhea  =========================================  ASSESSMENT:  (Z12.4) Screening for malignant neoplasm of cervix  (primary encounter diagnosis)  Comment: Pap smear and culture for GC chlamydia done.  Plan: Pap imaged thin layer screen only - recommended        age 21 - 24 years        Done    (Z11.3) Screen for STD (sexually transmitted disease)  Comment: Done  Plan: NEISSERIA GONORRHOEA PCR, CHLAMYDIA TRACHOMATIS        PCR        Done    (Z34.00) Supervision of normal first pregnancy, antepartum  Comment: In light of me being unable to hear heart tones today I passed for an ultrasound exam to confirm viability.  If that ultrasound is normal we will see her back in 1  month for routine care.  The patient will also do noninvasive  testing today.    we discussed frequent small meals and a bland diet.  Plan: PRENATA 29-1 MG CHEW, Pap imaged thin layer         screen only - recommended age 21 - 24 years,         NEISSERIA GONORRHOEA PCR, CHLAMYDIA TRACHOMATIS        PCR, US OB < 14 Weeks Single        Patient will call if over-the-counter antiemetics and conservative measures do not adequately control her symptoms    PREGNANCY AT RISK? no  ==========================================  PLAN:  Instructed on use of triage nurse line and contacting the on call MD after hours for an urgent need such as fever, vagina bleeding, bladder or vaginal infection, rupture of membranes,  or term labor.    Discussed the indications, uses for and false positives for quad screen, nuchal translucency and fetal survey ultrasound at 18-20 weeks gestation. Will inform us at the next visit if she wished to avail herself of these screens.  Instructed on best evidence for: weight gain for her BMI for pregnancy; healthy diet and foods to avoid; exercise and activity during pregnancy;avoiding exposure to toxoplasmosis; and maintenance of a generally healthy lifestyle.   Discussed the harms, benefits, side effects and alternative therapies for current prescribed and OTC medications.

## 2019-02-22 LAB
C TRACH DNA SPEC QL NAA+PROBE: NEGATIVE
N GONORRHOEA DNA SPEC QL NAA+PROBE: NEGATIVE
SPECIMEN SOURCE: NORMAL
SPECIMEN SOURCE: NORMAL

## 2019-02-25 LAB
COPATH REPORT: NORMAL
PAP: NORMAL

## 2019-02-26 ENCOUNTER — TELEPHONE (OUTPATIENT)
Dept: OBGYN | Facility: CLINIC | Age: 24
End: 2019-02-26

## 2019-02-26 NOTE — TELEPHONE ENCOUNTER
Results received from [facility] testing in [place] triage.  Testing done:  Innatal Prenatal Screen    Action:  Spoke to  (consent signed) and gave NORMAL results.    Gender:  Gender revealed to pt on the phone. (girl)    Routed to provider as FADIA MEDLEY R.N.  [facility]

## 2019-04-04 ENCOUNTER — PRENATAL OFFICE VISIT (OUTPATIENT)
Dept: OBGYN | Facility: CLINIC | Age: 24
End: 2019-04-04
Payer: COMMERCIAL

## 2019-04-04 DIAGNOSIS — Z34.00 SUPERVISION OF NORMAL FIRST PREGNANCY, ANTEPARTUM: Primary | ICD-10-CM

## 2019-04-04 PROCEDURE — 99207 ZZC PRENATAL VISIT: CPT | Performed by: OBSTETRICS & GYNECOLOGY

## 2019-04-05 VITALS — WEIGHT: 102 LBS | SYSTOLIC BLOOD PRESSURE: 106 MMHG | BODY MASS INDEX: 16.22 KG/M2 | DIASTOLIC BLOOD PRESSURE: 60 MMHG

## 2019-04-05 NOTE — PROGRESS NOTES
Olive Lima is a 23 year old female, 17w6d, Estimated Date of Delivery: Sep 7, 2019 who presents for prenatal care.    Patient's nausea and vomiting are dramatically improved.  Patient states that she is doing well without concerns.    A/P:  Doing well  18-20 week U/S ordered signs and symptoms of concern discussed patient to call

## 2019-04-26 ENCOUNTER — ANCILLARY PROCEDURE (OUTPATIENT)
Dept: ULTRASOUND IMAGING | Facility: CLINIC | Age: 24
End: 2019-04-26
Payer: COMMERCIAL

## 2019-04-26 DIAGNOSIS — Z34.00 SUPERVISION OF NORMAL FIRST PREGNANCY: ICD-10-CM

## 2019-04-26 PROCEDURE — 76805 OB US >/= 14 WKS SNGL FETUS: CPT | Performed by: FAMILY MEDICINE

## 2019-04-29 ENCOUNTER — TRANSCRIBE ORDERS (OUTPATIENT)
Dept: MATERNAL FETAL MEDICINE | Facility: CLINIC | Age: 24
End: 2019-04-29

## 2019-04-29 DIAGNOSIS — O26.90 PREGNANCY RELATED CONDITION, ANTEPARTUM: Primary | ICD-10-CM

## 2019-04-29 DIAGNOSIS — Z34.00 SUPERVISION OF NORMAL FIRST PREGNANCY, ANTEPARTUM: Primary | ICD-10-CM

## 2019-05-10 ENCOUNTER — PRE VISIT (OUTPATIENT)
Dept: MATERNAL FETAL MEDICINE | Facility: CLINIC | Age: 24
End: 2019-05-10

## 2019-05-15 ENCOUNTER — OFFICE VISIT (OUTPATIENT)
Dept: MATERNAL FETAL MEDICINE | Facility: CLINIC | Age: 24
End: 2019-05-15
Attending: OBSTETRICS & GYNECOLOGY
Payer: COMMERCIAL

## 2019-05-15 ENCOUNTER — HOSPITAL ENCOUNTER (OUTPATIENT)
Dept: ULTRASOUND IMAGING | Facility: CLINIC | Age: 24
Discharge: HOME | End: 2019-05-15
Attending: OBSTETRICS & GYNECOLOGY | Admitting: OBSTETRICS & GYNECOLOGY
Payer: COMMERCIAL

## 2019-05-15 DIAGNOSIS — O35.9XX0 SUSPECTED FETAL ANOMALY, ANTEPARTUM, SINGLE OR UNSPECIFIED FETUS: Primary | ICD-10-CM

## 2019-05-15 DIAGNOSIS — O26.90 PREGNANCY RELATED CONDITION, ANTEPARTUM: ICD-10-CM

## 2019-05-15 PROCEDURE — 76811 OB US DETAILED SNGL FETUS: CPT

## 2019-05-15 NOTE — PROGRESS NOTES
"Please see \"Imaging\" tab under \"Chart Review\" for details of today's US at the [facility].    Mj Colvin MD  Maternal-Fetal Medicine    "

## 2019-06-03 ENCOUNTER — PRENATAL OFFICE VISIT (OUTPATIENT)
Dept: OBGYN | Facility: CLINIC | Age: 24
End: 2019-06-03
Payer: COMMERCIAL

## 2019-06-03 VITALS — DIASTOLIC BLOOD PRESSURE: 56 MMHG | WEIGHT: 120.4 LBS | SYSTOLIC BLOOD PRESSURE: 102 MMHG | BODY MASS INDEX: 19.14 KG/M2

## 2019-06-03 DIAGNOSIS — O21.9 NAUSEA AND VOMITING IN PREGNANCY: ICD-10-CM

## 2019-06-03 DIAGNOSIS — Z34.00 SUPERVISION OF NORMAL FIRST PREGNANCY, ANTEPARTUM: Primary | ICD-10-CM

## 2019-06-03 PROCEDURE — 99207 ZZC PRENATAL VISIT: CPT | Performed by: ADVANCED PRACTICE MIDWIFE

## 2019-06-03 RX ORDER — ONDANSETRON 4 MG/1
4 TABLET, ORALLY DISINTEGRATING ORAL EVERY 8 HOURS PRN
Qty: 90 TABLET | Refills: 3 | Status: SHIPPED | OUTPATIENT
Start: 2019-06-03 | End: 2019-07-19

## 2019-06-03 NOTE — PROGRESS NOTES
S: Patient is here with  and . Has recently been dealing with n/v again for the past 2 weeks. Vomited during clinic visit today. Is no longer taking previously prescribed medications since n/v resolved after 4months. States she is able to keep food and water down intermittently, but states she also feels a burning/pressure sensation in her chest, behind her sternum.  Baby active.  Denies uterine cramping, vaginal bleeding or leaking of fluid  O: Vitals: /56 (BP Location: Right arm, Patient Position: Chair, Cuff Size: Adult Regular)   Wt 54.6 kg (120 lb 6.4 oz)   LMP 12/06/2018 (Exact Date)   Breastfeeding? No   BMI 19.14 kg/m    BMI= Body mass index is 19.14 kg/m .  Exam:  Constitutional: healthy, alert and no distress  Respiratory: respirations even and unlabored  Gastrointestinal: Abdomen soft, non-tender. Fundus measures appropriate for gestational age. Fetal heart tones hear without difficulty and within normal limits  : Deferred  Psychiatric: mentation appears normal and affect normal/bright  A:    Diagnosis Comments   1. Nausea and vomiting in pregnancy       P: Discussed GCT/repeat RPR for next visit, handout provided, reminded of longer appointment.  Tdap next visit; reviewed CDC recommendations and partner/family vaccination recommended as well.  Need for Rhogam? Uncertain. Patient does not have routine prenatal labs completed. These can be drawn at her upcoming visit to check for GDM. Future order already placed.   Keep upcoming M appointment.  Answered patient and 's questions about MD vs CNM care - are planning to deliver at [facility].  Encouraged patient to call with any questions or concerns.  Return to clinic 2 weeks    Cassandra Stern CNM on 6/3/2019 at 1:53 PM

## 2019-06-03 NOTE — PATIENT INSTRUCTIONS
Take Zantac twice a day, in the morning and the evening.    You can take Zofran when you a nauseous every 8 hours as needed.    Make another doctor appointment in 2 weeks.

## 2019-06-03 NOTE — LETTER
Nelda 3, 2019      Olive Lima  [address]  [place] [facility] [address]        To Whom It May Concern,     Please be aware that Olive Lima is deemed appropriate and safe to receive dental care during her current pregnancy. Please call our clinic if you have any further questions.        Sincerely,        Cassandra Stern CNM

## 2019-06-03 NOTE — NURSING NOTE
"Chief Complaint   Patient presents with     Prenatal Care     26 weeks 2 days       Initial /56 (BP Location: Right arm, Patient Position: Chair, Cuff Size: Adult Regular)   Wt 54.6 kg (120 lb 6.4 oz)   LMP 2018 (Exact Date)   Breastfeeding? No   BMI 19.14 kg/m   Estimated body mass index is 19.14 kg/m  as calculated from the following:    Height as of 19: 1.689 m (5' 6.5\").    Weight as of this encounter: 54.6 kg (120 lb 6.4 oz).  BP completed using cuff size: regular    Questioned patient about current smoking habits.  Pt. has never smoked.    26w2d      The following HM Due: NONE      +FM daily   +Nausea and voiiting has returned  +When feeling really tired she feels sweaty  Some pain in lower pelvic area.        Terri Jones, CMA on 6/3/2019 at 1:12 PM    "

## 2019-06-07 ENCOUNTER — HOSPITAL ENCOUNTER (OUTPATIENT)
Dept: ULTRASOUND IMAGING | Facility: CLINIC | Age: 24
Discharge: HOME | End: 2019-06-07
Attending: OBSTETRICS & GYNECOLOGY | Admitting: OBSTETRICS & GYNECOLOGY
Payer: COMMERCIAL

## 2019-06-07 ENCOUNTER — OFFICE VISIT (OUTPATIENT)
Dept: MATERNAL FETAL MEDICINE | Facility: CLINIC | Age: 24
End: 2019-06-07
Attending: OBSTETRICS & GYNECOLOGY
Payer: COMMERCIAL

## 2019-06-07 DIAGNOSIS — O35.9XX0 SUSPECTED FETAL ANOMALY, ANTEPARTUM, SINGLE OR UNSPECIFIED FETUS: ICD-10-CM

## 2019-06-07 DIAGNOSIS — O36.8390 FETAL ARRHYTHMIA AFFECTING PREGNANCY, ANTEPARTUM: Primary | ICD-10-CM

## 2019-06-07 PROCEDURE — 76816 OB US FOLLOW-UP PER FETUS: CPT

## 2019-06-07 NOTE — PROGRESS NOTES
"Please see \"Imaging\" tab under \"Chart Review\" for details of today's US.      Carolynn Andersen, DO  Maternal-Fetal Medicine        "

## 2019-06-10 LAB — NON INVASIVE PRENATAL TEST CELL FREE DNA: NORMAL

## 2019-06-11 ENCOUNTER — TELEPHONE (OUTPATIENT)
Dept: OBGYN | Facility: CLINIC | Age: 24
End: 2019-06-11

## 2019-06-11 NOTE — TELEPHONE ENCOUNTER
Pt s dental office calling to verify if lidocaine with epi and xray for dental work ok in pregnancy  Gave ok for xrays  Per Dionne Orellana plain lidocaine is ok epi is not recommended  No further questions from dental office  Mary Wall RN on 6/11/2019 at 3:52 PM

## 2019-06-14 DIAGNOSIS — Z34.03 ENCOUNTER FOR SUPERVISION OF NORMAL FIRST PREGNANCY IN THIRD TRIMESTER: ICD-10-CM

## 2019-06-14 DIAGNOSIS — Z34.00 SUPERVISION OF NORMAL FIRST PREGNANCY, ANTEPARTUM: Primary | ICD-10-CM

## 2019-06-17 ENCOUNTER — OFFICE VISIT (OUTPATIENT)
Dept: MATERNAL FETAL MEDICINE | Facility: CLINIC | Age: 24
End: 2019-06-17
Attending: OBSTETRICS & GYNECOLOGY
Payer: COMMERCIAL

## 2019-06-17 ENCOUNTER — HOSPITAL ENCOUNTER (OUTPATIENT)
Dept: ULTRASOUND IMAGING | Facility: CLINIC | Age: 24
Discharge: HOME | End: 2019-06-17
Attending: OBSTETRICS & GYNECOLOGY | Admitting: OBSTETRICS & GYNECOLOGY
Payer: COMMERCIAL

## 2019-06-17 DIAGNOSIS — O36.8390 FETAL ARRHYTHMIA AFFECTING PREGNANCY, ANTEPARTUM: ICD-10-CM

## 2019-06-17 DIAGNOSIS — O36.8390 FETAL ARRHYTHMIA AFFECTING PREGNANCY, ANTEPARTUM: Primary | ICD-10-CM

## 2019-06-17 PROCEDURE — 76815 OB US LIMITED FETUS(S): CPT

## 2019-06-17 NOTE — PROGRESS NOTES
"Please see \"Imaging\" tab under \"Chart Review\" for details of today's US.    Lauren Santos, DO    "

## 2019-06-17 NOTE — NURSING NOTE
here for M appointment on 6/17/19...    Name: Matt Guzman  ID#: [id number]  Agency Name: [facility]

## 2019-06-21 ENCOUNTER — PRENATAL OFFICE VISIT (OUTPATIENT)
Dept: OBGYN | Facility: CLINIC | Age: 24
End: 2019-06-21
Payer: COMMERCIAL

## 2019-06-21 VITALS — DIASTOLIC BLOOD PRESSURE: 64 MMHG | SYSTOLIC BLOOD PRESSURE: 110 MMHG | WEIGHT: 125 LBS | BODY MASS INDEX: 19.87 KG/M2

## 2019-06-21 DIAGNOSIS — Z34.00 SUPERVISION OF NORMAL FIRST PREGNANCY, ANTEPARTUM: Primary | ICD-10-CM

## 2019-06-21 DIAGNOSIS — O36.8390 FETAL ARRHYTHMIA AFFECTING PREGNANCY, ANTEPARTUM: ICD-10-CM

## 2019-06-21 DIAGNOSIS — R81 GLUCOSE FOUND IN URINE ON EXAMINATION: ICD-10-CM

## 2019-06-21 LAB
ABO + RH BLD: NORMAL
ABO + RH BLD: NORMAL
BLD GP AB SCN SERPL QL: NORMAL
BLOOD BANK CMNT PATIENT-IMP: NORMAL
ERYTHROCYTE [DISTWIDTH] IN BLOOD BY AUTOMATED COUNT: 13.8 % (ref 10–15)
HBA1C MFR BLD: 4.8 % (ref 0–5.6)
HCT VFR BLD AUTO: 36.2 % (ref 35–47)
HGB BLD-MCNC: 12.1 G/DL (ref 11.7–15.7)
MCH RBC QN AUTO: 29.6 PG (ref 26.5–33)
MCHC RBC AUTO-ENTMCNC: 33.4 G/DL (ref 31.5–36.5)
MCV RBC AUTO: 89 FL (ref 78–100)
PLATELET # BLD AUTO: 170 10E9/L (ref 150–450)
RBC # BLD AUTO: 4.09 10E12/L (ref 3.8–5.2)
SPECIMEN EXP DATE BLD: NORMAL
WBC # BLD AUTO: 5.9 10E9/L (ref 4–11)

## 2019-06-21 PROCEDURE — 87389 HIV-1 AG W/HIV-1&-2 AB AG IA: CPT | Performed by: OBSTETRICS & GYNECOLOGY

## 2019-06-21 PROCEDURE — 86762 RUBELLA ANTIBODY: CPT | Performed by: OBSTETRICS & GYNECOLOGY

## 2019-06-21 PROCEDURE — 86901 BLOOD TYPING SEROLOGIC RH(D): CPT | Performed by: OBSTETRICS & GYNECOLOGY

## 2019-06-21 PROCEDURE — 86780 TREPONEMA PALLIDUM: CPT | Performed by: OBSTETRICS & GYNECOLOGY

## 2019-06-21 PROCEDURE — 36415 COLL VENOUS BLD VENIPUNCTURE: CPT | Performed by: OBSTETRICS & GYNECOLOGY

## 2019-06-21 PROCEDURE — 86787 VARICELLA-ZOSTER ANTIBODY: CPT | Performed by: OBSTETRICS & GYNECOLOGY

## 2019-06-21 PROCEDURE — 83036 HEMOGLOBIN GLYCOSYLATED A1C: CPT | Performed by: OBSTETRICS & GYNECOLOGY

## 2019-06-21 PROCEDURE — 86900 BLOOD TYPING SEROLOGIC ABO: CPT | Performed by: OBSTETRICS & GYNECOLOGY

## 2019-06-21 PROCEDURE — 99207 ZZC PRENATAL VISIT: CPT | Performed by: OBSTETRICS & GYNECOLOGY

## 2019-06-21 PROCEDURE — 87340 HEPATITIS B SURFACE AG IA: CPT | Performed by: OBSTETRICS & GYNECOLOGY

## 2019-06-21 PROCEDURE — 85027 COMPLETE CBC AUTOMATED: CPT | Performed by: OBSTETRICS & GYNECOLOGY

## 2019-06-21 PROCEDURE — 86850 RBC ANTIBODY SCREEN: CPT | Performed by: OBSTETRICS & GYNECOLOGY

## 2019-06-21 NOTE — NURSING NOTE
"Chief Complaint   Patient presents with     Prenatal Care       Initial /64   Wt 56.7 kg (125 lb)   LMP 2018 (Exact Date)   BMI 19.87 kg/m   Estimated body mass index is 19.87 kg/m  as calculated from the following:    Height as of 19: 1.689 m (5' 6.5\").    Weight as of this encounter: 56.7 kg (125 lb).  BP completed using cuff size: regular    Questioned patient about current smoking habits.  Pt. has never smoked.          GTT  RPR  CBC    Dominique Young, [facility]            "

## 2019-06-21 NOTE — PATIENT INSTRUCTIONS
You can reach your [place] Care Team any time of the day by calling [phone]. This number will put you in touch with the 24 hour nurse line if the clinic is closed.    To contact your OB/GYN Surgery Scheduler please call [phone]. This is a direct number for your care team between 8 a.m. and 4 p.m. Monday through Friday.    [facility] is open for your convenience: [phone]  Monday through Friday 8 a.m. to 8:30 p.m.  Saturday 9 a.m. to 6 p.m.  Sunday Noon to 6 p.m.    They are closed on all major holidays.

## 2019-06-22 PROBLEM — O36.8390 FETAL ARRHYTHMIA AFFECTING PREGNANCY, ANTEPARTUM: Status: ACTIVE | Noted: 2019-06-22

## 2019-06-22 LAB
RUBV IGG SERPL IA-ACNC: 43 IU/ML
T PALLIDUM AB SER QL: NONREACTIVE
VZV IGG SER QL IA: 3.8 AI (ref 0–0.8)

## 2019-06-22 NOTE — PROGRESS NOTES
Olive Lima is a 23 year old female, 29w0d, Estimated Date of Delivery: Sep 7, 2019 who presents for prenatal care.  Good fetal movement.  Patient states that she is taking in adequate nutrition.  Dietary supplements reviewed with the patient through the .  [facility] ultrasound note from June 17, 2019 reviewed hemoglobin A1c result 4.8 done to evaluate glucosuria    A/P: Doing well.  Signs and symptoms of concern discussed patient to call

## 2019-06-24 LAB
HBV SURFACE AG SERPL QL IA: NONREACTIVE
HIV 1+2 AB+HIV1 P24 AG SERPL QL IA: NONREACTIVE

## 2019-07-05 ENCOUNTER — PRENATAL OFFICE VISIT (OUTPATIENT)
Dept: OBGYN | Facility: CLINIC | Age: 24
End: 2019-07-05
Payer: COMMERCIAL

## 2019-07-05 VITALS — WEIGHT: 130.7 LBS | DIASTOLIC BLOOD PRESSURE: 54 MMHG | BODY MASS INDEX: 20.78 KG/M2 | SYSTOLIC BLOOD PRESSURE: 96 MMHG

## 2019-07-05 DIAGNOSIS — Z34.00 SUPERVISION OF NORMAL FIRST PREGNANCY, ANTEPARTUM: Primary | ICD-10-CM

## 2019-07-05 LAB — GLUCOSE 1H P 50 G GLC PO SERPL-MCNC: 105 MG/DL (ref 60–129)

## 2019-07-05 PROCEDURE — 99207 ZZC PRENATAL VISIT: CPT | Performed by: OBSTETRICS & GYNECOLOGY

## 2019-07-05 PROCEDURE — 82950 GLUCOSE TEST: CPT | Performed by: OBSTETRICS & GYNECOLOGY

## 2019-07-05 PROCEDURE — 36415 COLL VENOUS BLD VENIPUNCTURE: CPT | Performed by: OBSTETRICS & GYNECOLOGY

## 2019-07-05 NOTE — NURSING NOTE
"Chief Complaint   Patient presents with     Prenatal Care       Initial BP 96/54 (BP Location: Left arm, Cuff Size: Adult Regular)   Wt 59.3 kg (130 lb 11.2 oz)   LMP 2018 (Exact Date)   BMI 20.78 kg/m   Estimated body mass index is 20.78 kg/m  as calculated from the following:    Height as of 19: 1.689 m (5' 6.5\").    Weight as of this encounter: 59.3 kg (130 lb 11.2 oz).  BP completed using cuff size: regular    Questioned patient about current smoking habits.  Pt. has never smoked.          Leonel Sow, ADDI             "

## 2019-07-05 NOTE — PATIENT INSTRUCTIONS
You can reach your [place] Care Team any time of the day by calling [phone]. This number will put you in touch with the 24 hour nurse line if the clinic is closed.    To contact your OB/GYN Surgery Scheduler please call [phone]. This is a direct number for your care team between 8 a.m. and 4 p.m. Monday through Friday.    [facility] is open for your convenience: [phone]  Monday through Friday 8 a.m. to 8:30 p.m.  Saturday 9 a.m. to 6 p.m.  Sunday Noon to 6 p.m.    They are closed on all major holidays.

## 2019-07-08 NOTE — PROGRESS NOTES
Olive Lima is a 23 year old female, 31w1d, Estimated Date of Delivery: Sep 7, 2019 who presents for prenatal care.  Good fetal movement.  No signs or symptoms of concern    A/P:   Doing well  Sx of concern discussed pt to call

## 2019-07-19 ENCOUNTER — PRENATAL OFFICE VISIT (OUTPATIENT)
Dept: OBGYN | Facility: CLINIC | Age: 24
End: 2019-07-19
Payer: COMMERCIAL

## 2019-07-19 VITALS — DIASTOLIC BLOOD PRESSURE: 68 MMHG | WEIGHT: 131 LBS | SYSTOLIC BLOOD PRESSURE: 100 MMHG | BODY MASS INDEX: 20.83 KG/M2

## 2019-07-19 DIAGNOSIS — Z34.00 SUPERVISION OF NORMAL FIRST PREGNANCY, ANTEPARTUM: Primary | ICD-10-CM

## 2019-07-19 PROCEDURE — 99207 ZZC PRENATAL VISIT: CPT | Performed by: OBSTETRICS & GYNECOLOGY

## 2019-07-19 NOTE — PATIENT INSTRUCTIONS
You can reach your [place] Care Team any time of the day by calling [phone]. This number will put you in touch with the 24 hour nurse line if the clinic is closed.    To contact your OB/GYN Station Coordinator/Surgery Scheduler please call [phone]. This is a direct number for your care team between 8 a.m. and 4 p.m. Monday through Friday.    [facility] is open for your convenience:  Monday through Friday 8 a.m. to 6 p.m.  Closed weekends and all major holidays.

## 2019-07-19 NOTE — NURSING NOTE
"Chief Complaint   Patient presents with     Prenatal Care     32 6/7 weeks       Initial /68   Wt 59.4 kg (131 lb)   LMP 2018 (Exact Date)   BMI 20.83 kg/m   Estimated body mass index is 20.83 kg/m  as calculated from the following:    Height as of 19: 1.689 m (5' 6.5\").    Weight as of this encounter: 59.4 kg (131 lb).  BP completed using cuff size: regular    Questioned patient about current smoking habits.  Pt. has never smoked.          The following HM Due: NONE    +fetal movement  -swelling    .tulio    "

## 2019-07-19 NOTE — PROGRESS NOTES
Olive Lima is a 23 year old female, 32w6d, Estimated Date of Delivery: Sep 7, 2019 who presents for prenatal care.   Good fetal movement.  No signs or symptoms of concern.  Adequate caloric intake with reviewed again with the patient and her .  Fetal heart rate pattern today is regular    A/P:  Doing well  Sx of concern discussed pt to call

## 2019-08-01 ENCOUNTER — PRENATAL OFFICE VISIT (OUTPATIENT)
Dept: OBGYN | Facility: CLINIC | Age: 24
End: 2019-08-01
Payer: COMMERCIAL

## 2019-08-01 VITALS — DIASTOLIC BLOOD PRESSURE: 78 MMHG | BODY MASS INDEX: 21.78 KG/M2 | WEIGHT: 137 LBS | SYSTOLIC BLOOD PRESSURE: 120 MMHG

## 2019-08-01 DIAGNOSIS — Z34.00 SUPERVISION OF NORMAL FIRST PREGNANCY, ANTEPARTUM: Primary | ICD-10-CM

## 2019-08-01 PROCEDURE — 99207 ZZC PRENATAL VISIT: CPT | Performed by: OBSTETRICS & GYNECOLOGY

## 2019-08-01 NOTE — PATIENT INSTRUCTIONS
You can reach your [place] Care Team any time of the day by calling [phone]. This number will put you in touch with the 24 hour nurse line if the clinic is closed.    To contact your OB/GYN Station Coordinator/Surgery Scheduler please call [phone]. This is a direct number for your care team between 8 a.m. and 4 p.m. Monday through Friday.    [facility] is open for your convenience:  Monday through Friday 8 a.m. to 6 p.m.  Closed weekends and all major holidays.

## 2019-08-01 NOTE — NURSING NOTE
"Chief Complaint   Patient presents with     Prenatal Care       Initial /78   Wt 62.1 kg (137 lb)   LMP 2018 (Exact Date)   BMI 21.78 kg/m   Estimated body mass index is 21.78 kg/m  as calculated from the following:    Height as of 19: 1.689 m (5' 6.5\").    Weight as of this encounter: 62.1 kg (137 lb).  BP completed using cuff size: regular    Questioned patient about current smoking habits.  Pt. has never smoked.          34w5d    Dominique Young [facility]            "

## 2019-08-02 NOTE — PROGRESS NOTES
Olive Lima is a 23 year old female, 34w6d, Estimated Date of Delivery: Sep 7, 2019 who presents for prenatal care.   Good fetal movement.  No signs or symptoms of concern.    A/P:  Doing well on exam today her fundal height lags behind.  I will get an ultrasound to confirm appropriate gestational growth.  I will see the patient back weekly for the remainder of the pregnancy.  Signs and symptoms of concern discussed patient to call.  Findings reviewed with the patient through the

## 2019-08-08 ENCOUNTER — ANCILLARY PROCEDURE (OUTPATIENT)
Dept: ULTRASOUND IMAGING | Facility: CLINIC | Age: 24
End: 2019-08-08
Attending: OBSTETRICS & GYNECOLOGY
Payer: COMMERCIAL

## 2019-08-08 ENCOUNTER — PRENATAL OFFICE VISIT (OUTPATIENT)
Dept: OBGYN | Facility: CLINIC | Age: 24
End: 2019-08-08
Payer: COMMERCIAL

## 2019-08-08 VITALS — BODY MASS INDEX: 22.1 KG/M2 | SYSTOLIC BLOOD PRESSURE: 126 MMHG | WEIGHT: 139 LBS | DIASTOLIC BLOOD PRESSURE: 70 MMHG

## 2019-08-08 DIAGNOSIS — Z34.00 SUPERVISION OF NORMAL FIRST PREGNANCY, ANTEPARTUM: Primary | ICD-10-CM

## 2019-08-08 DIAGNOSIS — Z34.00 SUPERVISION OF NORMAL FIRST PREGNANCY, ANTEPARTUM: ICD-10-CM

## 2019-08-08 PROCEDURE — 99207 ZZC PRENATAL VISIT: CPT | Performed by: OBSTETRICS & GYNECOLOGY

## 2019-08-08 PROCEDURE — 87653 STREP B DNA AMP PROBE: CPT | Performed by: OBSTETRICS & GYNECOLOGY

## 2019-08-08 PROCEDURE — 76816 OB US FOLLOW-UP PER FETUS: CPT | Performed by: OBSTETRICS & GYNECOLOGY

## 2019-08-08 NOTE — PATIENT INSTRUCTIONS
You can reach your [place] Care Team any time of the day by calling [phone]. This number will put you in touch with the 24 hour nurse line if the clinic is closed.    To contact your OB/GYN Surgery Scheduler please call [phone]. This is a direct number for your care team between 8 a.m. and 4 p.m. Monday through Friday.    [facility] is open for your convenience: [phone]  Monday through Friday 8 a.m. to 8:30 p.m.  Saturday 9 a.m. to 6 p.m.  Sunday Noon to 6 p.m.    They are closed on all major holidays.

## 2019-08-08 NOTE — NURSING NOTE
"Chief Complaint   Patient presents with     Prenatal Care       Initial /70   Wt 63 kg (139 lb)   LMP 2018 (Exact Date)   Breastfeeding? No   BMI 22.10 kg/m   Estimated body mass index is 22.1 kg/m  as calculated from the following:    Height as of 19: 1.689 m (5' 6.5\").    Weight as of this encounter: 63 kg (139 lb).  BP completed using cuff size: regular    Questioned patient about current smoking habits.  Pt. has never smoked.          35w5d  + FM daily  - cramping, bleeding or contractions  + mild edema  - headache  Carlene Henderson LPN               "

## 2019-08-09 LAB
GP B STREP DNA SPEC QL NAA+PROBE: NEGATIVE
SPECIMEN SOURCE: NORMAL

## 2019-08-09 NOTE — PROGRESS NOTES
Olive Lima is a 23 year old female, 35w6d, Estimated Date of Delivery: Sep 7, 2019 who presents for prenatal care.  Good fetal movement.  I reviewed the results of the fetal ultrasound done to estimate fetal weight and estimated fetal weight is 2472 g in the 26.3 percentile no fetal anomalies.  Normal RADHA doing well    A/P:  Doing well signs and symptoms of labor and concern discussed patient will call.  GBS done

## 2019-08-15 ENCOUNTER — PRENATAL OFFICE VISIT (OUTPATIENT)
Dept: OBGYN | Facility: CLINIC | Age: 24
End: 2019-08-15
Payer: COMMERCIAL

## 2019-08-15 VITALS — WEIGHT: 139 LBS | BODY MASS INDEX: 22.1 KG/M2 | SYSTOLIC BLOOD PRESSURE: 118 MMHG | DIASTOLIC BLOOD PRESSURE: 70 MMHG

## 2019-08-15 DIAGNOSIS — Z34.00 SUPERVISION OF NORMAL FIRST PREGNANCY, ANTEPARTUM: Primary | ICD-10-CM

## 2019-08-15 PROCEDURE — 99207 ZZC PRENATAL VISIT: CPT | Performed by: OBSTETRICS & GYNECOLOGY

## 2019-08-15 NOTE — PROGRESS NOTES
Olive Lima is a 23 year old female, 36w5d, Estimated Date of Delivery: Sep 7, 2019 who presents for prenatal care.    Good fetal movement.  Patient is experiencing some constipation.  We discussed the use of FiberCon tablets and fiber in her diet.  I reviewed the results of the negative GBS with the patient and her .  Patient states that she is taking in adequate caloric intake exam today revealed a regular fetal heart rate and rhythm      A/P Doing well  signs and symptoms of concern discussed patient will call:

## 2019-08-15 NOTE — PATIENT INSTRUCTIONS
You can reach your [place] Care Team any time of the day by calling [phone]. This number will put you in touch with the 24 hour nurse line if the clinic is closed.    To contact your OB/GYN Surgery Scheduler please call [phone]. This is a direct number for your care team between 8 a.m. and 4 p.m. Monday through Friday.    [facility] is open for your convenience: [phone]  Monday through Friday 8 a.m. to 8:30 p.m.  Saturday 9 a.m. to 6 p.m.  Sunday Noon to 6 p.m.    They are closed on all major holidays.

## 2019-08-15 NOTE — NURSING NOTE
"Chief Complaint   Patient presents with     Prenatal Care     36 5/7 weeks       Initial /70   Wt 63 kg (139 lb)   LMP 2018 (Exact Date)   BMI 22.10 kg/m   Estimated body mass index is 22.1 kg/m  as calculated from the following:    Height as of 19: 1.689 m (5' 6.5\").    Weight as of this encounter: 63 kg (139 lb).  BP completed using cuff size: regular    Questioned patient about current smoking habits.  Pt. has never smoked.          The following HM Due: NONE  +fetal movement      Modesta Rosa, CMA    "

## 2019-08-22 ENCOUNTER — PRENATAL OFFICE VISIT (OUTPATIENT)
Dept: OBGYN | Facility: CLINIC | Age: 24
End: 2019-08-22
Payer: COMMERCIAL

## 2019-08-22 VITALS — WEIGHT: 142 LBS | SYSTOLIC BLOOD PRESSURE: 110 MMHG | BODY MASS INDEX: 22.58 KG/M2 | DIASTOLIC BLOOD PRESSURE: 68 MMHG

## 2019-08-22 DIAGNOSIS — Z34.00 SUPERVISION OF NORMAL FIRST PREGNANCY, ANTEPARTUM: Primary | ICD-10-CM

## 2019-08-22 PROCEDURE — 99207 ZZC PRENATAL VISIT: CPT | Performed by: OBSTETRICS & GYNECOLOGY

## 2019-08-22 NOTE — NURSING NOTE
"Chief Complaint   Patient presents with     Prenatal Care       Initial /68   Wt 64.4 kg (142 lb)   LMP 2018 (Exact Date)   BMI 22.58 kg/m   Estimated body mass index is 22.58 kg/m  as calculated from the following:    Height as of 19: 1.689 m (5' 6.5\").    Weight as of this encounter: 64.4 kg (142 lb).  BP completed using cuff size: regular    Questioned patient about current smoking habits.  Pt. has never smoked.          The following HM Due: NONE      The following patient reported/Care Every where data was sent to:  P ABSTRACT QUALITY INITIATIVES [[id number]]        Dominique Young [facility]                 "

## 2019-08-22 NOTE — PROGRESS NOTES
AGA doing well, good FM no concerns  GBS done      A/P:  Doing well  Sx of labor and concern discussed pt to call

## 2019-08-22 NOTE — PATIENT INSTRUCTIONS
You can reach your [place] Care Team any time of the day by calling [phone]. This number will put you in touch with the 24 hour nurse line if the clinic is closed.    To contact your OB/GYN Station Coordinator/Surgery Scheduler please call [phone]. This is a direct number for your care team between 8 a.m. and 4 p.m. Monday through Friday.    [facility] is open for your convenience:  Monday through Friday 8 a.m. to 6 p.m.  Closed weekends and all major holidays.

## 2019-08-26 NOTE — PROGRESS NOTES
"Feels well, here with partner. Has some occasional sharp and uncomfortable contractions but they go away quickly and \"are not that bad.\"  Fetal movement: positive  Denies vaginal bleeding/lof, some contractions  Warning signs reviewed   Labor signs and symptoms discussed, aware of numbers to call  Denies s/sx of pre-eclampsia and aware of what to report  Discussed SVE, declines today  Verified vertex with bedside US  Return to clinic 1 week    PABLO Alexandre, CNM    "

## 2019-08-28 ENCOUNTER — PRENATAL OFFICE VISIT (OUTPATIENT)
Dept: OBGYN | Facility: CLINIC | Age: 24
End: 2019-08-28
Payer: COMMERCIAL

## 2019-08-28 VITALS — BODY MASS INDEX: 22.58 KG/M2 | DIASTOLIC BLOOD PRESSURE: 72 MMHG | SYSTOLIC BLOOD PRESSURE: 122 MMHG | WEIGHT: 142 LBS

## 2019-08-28 DIAGNOSIS — Z34.03 ENCOUNTER FOR SUPERVISION OF NORMAL FIRST PREGNANCY IN THIRD TRIMESTER: Primary | ICD-10-CM

## 2019-08-28 PROCEDURE — 99207 ZZC PRENATAL VISIT: CPT | Performed by: ADVANCED PRACTICE MIDWIFE

## 2019-08-28 PROCEDURE — 59426 ANTEPARTUM CARE ONLY: CPT | Performed by: ADVANCED PRACTICE MIDWIFE

## 2019-09-05 ENCOUNTER — PRENATAL OFFICE VISIT (OUTPATIENT)
Dept: OBGYN | Facility: CLINIC | Age: 24
End: 2019-09-05
Payer: COMMERCIAL

## 2019-09-05 VITALS — SYSTOLIC BLOOD PRESSURE: 108 MMHG | BODY MASS INDEX: 22.74 KG/M2 | DIASTOLIC BLOOD PRESSURE: 70 MMHG | WEIGHT: 143 LBS

## 2019-09-05 DIAGNOSIS — Z34.00 SUPERVISION OF NORMAL FIRST PREGNANCY, ANTEPARTUM: Primary | ICD-10-CM

## 2019-09-05 PROCEDURE — 99212 OFFICE O/P EST SF 10 MIN: CPT | Performed by: OBSTETRICS & GYNECOLOGY

## 2019-09-05 NOTE — NURSING NOTE
"Chief Complaint   Patient presents with     Prenatal Care       Initial /70   Wt 64.9 kg (143 lb)   LMP 2018 (Exact Date)   BMI 22.74 kg/m   Estimated body mass index is 22.74 kg/m  as calculated from the following:    Height as of 19: 1.689 m (5' 6.5\").    Weight as of this encounter: 64.9 kg (143 lb).  BP completed using cuff size: regular    Questioned patient about current smoking habits.  Pt. has never smoked.          39w5d      Dominique Young [facility]            "

## 2019-09-05 NOTE — PROGRESS NOTES
Olive Lima is a 23 year old female, 39w5d, Estimated Date of Delivery: Sep 7, 2019 who presents for prenatal care.  Good fetal movement.  No signs or symptoms of concern    A/P:  Doing well  Sx of labor and concern discussed pt to call

## 2019-09-05 NOTE — PATIENT INSTRUCTIONS
You can reach your [place] Care Team any time of the day by calling [phone]. This number will put you in touch with the 24 hour nurse line if the clinic is closed.    To contact your OB/GYN Surgery Scheduler please call [phone]. This is a direct number for your care team between 8 a.m. and 4 p.m. Monday through Friday.    [facility] is open for your convenience: [phone]  Monday through Friday 8 a.m. to 8:30 p.m.  Saturday 9 a.m. to 6 p.m.  Sunday Noon to 6 p.m.    They are closed on all major holidays.

## 2019-09-08 ENCOUNTER — ANESTHESIA (OUTPATIENT)
Dept: OBGYN | Facility: CLINIC | Age: 24
End: 2019-09-08
Payer: COMMERCIAL

## 2019-09-08 ENCOUNTER — ANESTHESIA EVENT (OUTPATIENT)
Dept: OBGYN | Facility: CLINIC | Age: 24
End: 2019-09-08
Payer: COMMERCIAL

## 2019-09-08 ENCOUNTER — HOSPITAL ENCOUNTER (INPATIENT)
Facility: CLINIC | Age: 24
LOS: 2 days | Discharge: HOME HEALTH | End: 2019-09-10
Attending: OBSTETRICS & GYNECOLOGY | Admitting: OBSTETRICS & GYNECOLOGY
Payer: COMMERCIAL

## 2019-09-08 LAB
ABO + RH BLD: NORMAL
ABO + RH BLD: NORMAL
HGB BLD-MCNC: 14.3 G/DL (ref 11.7–15.7)
SPECIMEN EXP DATE BLD: NORMAL

## 2019-09-08 PROCEDURE — 25000128 H RX IP 250 OP 636

## 2019-09-08 PROCEDURE — 25800030 ZZH RX IP 258 OP 636: Performed by: OBSTETRICS & GYNECOLOGY

## 2019-09-08 PROCEDURE — 25000125 ZZHC RX 250: Performed by: OBSTETRICS & GYNECOLOGY

## 2019-09-08 PROCEDURE — 86900 BLOOD TYPING SEROLOGIC ABO: CPT | Performed by: OBSTETRICS & GYNECOLOGY

## 2019-09-08 PROCEDURE — 10907ZC DRAINAGE OF AMNIOTIC FLUID, THERAPEUTIC FROM PRODUCTS OF CONCEPTION, VIA NATURAL OR ARTIFICIAL OPENING: ICD-10-PCS | Performed by: OBSTETRICS & GYNECOLOGY

## 2019-09-08 PROCEDURE — G0463 HOSPITAL OUTPT CLINIC VISIT: HCPCS

## 2019-09-08 PROCEDURE — 72200001 ZZH LABOR CARE VAGINAL DELIVERY SINGLE

## 2019-09-08 PROCEDURE — 40000671 ZZH STATISTIC ANESTHESIA CASE

## 2019-09-08 PROCEDURE — 25000132 ZZH RX MED GY IP 250 OP 250 PS 637: Performed by: OBSTETRICS & GYNECOLOGY

## 2019-09-08 PROCEDURE — 3E0R3BZ INTRODUCTION OF ANESTHETIC AGENT INTO SPINAL CANAL, PERCUTANEOUS APPROACH: ICD-10-PCS | Performed by: ANESTHESIOLOGY

## 2019-09-08 PROCEDURE — 59410 OBSTETRICAL CARE: CPT | Performed by: OBSTETRICS & GYNECOLOGY

## 2019-09-08 PROCEDURE — 12000000 ZZH R&B MED SURG/OB

## 2019-09-08 PROCEDURE — 86780 TREPONEMA PALLIDUM: CPT | Performed by: OBSTETRICS & GYNECOLOGY

## 2019-09-08 PROCEDURE — 25800030 ZZH RX IP 258 OP 636

## 2019-09-08 PROCEDURE — 86901 BLOOD TYPING SEROLOGIC RH(D): CPT | Performed by: OBSTETRICS & GYNECOLOGY

## 2019-09-08 PROCEDURE — 27110038 ZZH RX 271

## 2019-09-08 PROCEDURE — 00HU33Z INSERTION OF INFUSION DEVICE INTO SPINAL CANAL, PERCUTANEOUS APPROACH: ICD-10-PCS | Performed by: ANESTHESIOLOGY

## 2019-09-08 PROCEDURE — 0KQM0ZZ REPAIR PERINEUM MUSCLE, OPEN APPROACH: ICD-10-PCS | Performed by: OBSTETRICS & GYNECOLOGY

## 2019-09-08 PROCEDURE — 37000011 ZZH ANESTHESIA WARD SERVICE

## 2019-09-08 PROCEDURE — 85018 HEMOGLOBIN: CPT | Performed by: OBSTETRICS & GYNECOLOGY

## 2019-09-08 RX ORDER — LANOLIN 100 %
OINTMENT (GRAM) TOPICAL
Status: DISCONTINUED | OUTPATIENT
Start: 2019-09-08 | End: 2019-09-10 | Stop reason: HOSPADM

## 2019-09-08 RX ORDER — NALOXONE HYDROCHLORIDE 0.4 MG/ML
.1-.4 INJECTION, SOLUTION INTRAMUSCULAR; INTRAVENOUS; SUBCUTANEOUS
Status: DISCONTINUED | OUTPATIENT
Start: 2019-09-08 | End: 2019-09-08

## 2019-09-08 RX ORDER — ACETAMINOPHEN 325 MG/1
650 TABLET ORAL EVERY 4 HOURS PRN
Status: DISCONTINUED | OUTPATIENT
Start: 2019-09-08 | End: 2019-09-10 | Stop reason: HOSPADM

## 2019-09-08 RX ORDER — OXYTOCIN/0.9 % SODIUM CHLORIDE 30/500 ML
100 PLASTIC BAG, INJECTION (ML) INTRAVENOUS CONTINUOUS
Status: DISCONTINUED | OUTPATIENT
Start: 2019-09-08 | End: 2019-09-10 | Stop reason: HOSPADM

## 2019-09-08 RX ORDER — FENTANYL CITRATE 50 UG/ML
50-100 INJECTION, SOLUTION INTRAMUSCULAR; INTRAVENOUS
Status: DISCONTINUED | OUTPATIENT
Start: 2019-09-08 | End: 2019-09-09

## 2019-09-08 RX ORDER — EPHEDRINE SULFATE 50 MG/ML
5 INJECTION, SOLUTION INTRAMUSCULAR; INTRAVENOUS; SUBCUTANEOUS
Status: DISCONTINUED | OUTPATIENT
Start: 2019-09-08 | End: 2019-09-09

## 2019-09-08 RX ORDER — OXYTOCIN/0.9 % SODIUM CHLORIDE 30/500 ML
340 PLASTIC BAG, INJECTION (ML) INTRAVENOUS CONTINUOUS PRN
Status: DISCONTINUED | OUTPATIENT
Start: 2019-09-08 | End: 2019-09-10 | Stop reason: HOSPADM

## 2019-09-08 RX ORDER — BISACODYL 10 MG
10 SUPPOSITORY, RECTAL RECTAL DAILY PRN
Status: DISCONTINUED | OUTPATIENT
Start: 2019-09-10 | End: 2019-09-10 | Stop reason: HOSPADM

## 2019-09-08 RX ORDER — CARBOPROST TROMETHAMINE 250 UG/ML
250 INJECTION, SOLUTION INTRAMUSCULAR
Status: DISCONTINUED | OUTPATIENT
Start: 2019-09-08 | End: 2019-09-09

## 2019-09-08 RX ORDER — AMOXICILLIN 250 MG
2 CAPSULE ORAL 2 TIMES DAILY
Status: DISCONTINUED | OUTPATIENT
Start: 2019-09-08 | End: 2019-09-10 | Stop reason: HOSPADM

## 2019-09-08 RX ORDER — IBUPROFEN 800 MG/1
800 TABLET, FILM COATED ORAL EVERY 6 HOURS PRN
Status: DISCONTINUED | OUTPATIENT
Start: 2019-09-08 | End: 2019-09-10 | Stop reason: HOSPADM

## 2019-09-08 RX ORDER — NALOXONE HYDROCHLORIDE 0.4 MG/ML
.1-.4 INJECTION, SOLUTION INTRAMUSCULAR; INTRAVENOUS; SUBCUTANEOUS
Status: DISCONTINUED | OUTPATIENT
Start: 2019-09-08 | End: 2019-09-09

## 2019-09-08 RX ORDER — AMOXICILLIN 250 MG
1 CAPSULE ORAL 2 TIMES DAILY
Status: DISCONTINUED | OUTPATIENT
Start: 2019-09-08 | End: 2019-09-10 | Stop reason: HOSPADM

## 2019-09-08 RX ORDER — NALBUPHINE HYDROCHLORIDE 10 MG/ML
2.5-5 INJECTION, SOLUTION INTRAMUSCULAR; INTRAVENOUS; SUBCUTANEOUS EVERY 6 HOURS PRN
Status: DISCONTINUED | OUTPATIENT
Start: 2019-09-08 | End: 2019-09-09

## 2019-09-08 RX ORDER — NALOXONE HYDROCHLORIDE 0.4 MG/ML
.1-.4 INJECTION, SOLUTION INTRAMUSCULAR; INTRAVENOUS; SUBCUTANEOUS
Status: DISCONTINUED | OUTPATIENT
Start: 2019-09-08 | End: 2019-09-10 | Stop reason: HOSPADM

## 2019-09-08 RX ORDER — OXYCODONE AND ACETAMINOPHEN 5; 325 MG/1; MG/1
1 TABLET ORAL
Status: DISCONTINUED | OUTPATIENT
Start: 2019-09-08 | End: 2019-09-09

## 2019-09-08 RX ORDER — SODIUM CHLORIDE, SODIUM LACTATE, POTASSIUM CHLORIDE, CALCIUM CHLORIDE 600; 310; 30; 20 MG/100ML; MG/100ML; MG/100ML; MG/100ML
INJECTION, SOLUTION INTRAVENOUS CONTINUOUS
Status: DISCONTINUED | OUTPATIENT
Start: 2019-09-08 | End: 2019-09-09

## 2019-09-08 RX ORDER — EPHEDRINE SULFATE 50 MG/ML
INJECTION, SOLUTION INTRAMUSCULAR; INTRAVENOUS; SUBCUTANEOUS
Status: DISCONTINUED
Start: 2019-09-08 | End: 2019-09-09 | Stop reason: HOSPADM

## 2019-09-08 RX ORDER — HYDROCORTISONE 2.5 %
CREAM (GRAM) TOPICAL 3 TIMES DAILY PRN
Status: DISCONTINUED | OUTPATIENT
Start: 2019-09-08 | End: 2019-09-10 | Stop reason: HOSPADM

## 2019-09-08 RX ORDER — OXYTOCIN/0.9 % SODIUM CHLORIDE 30/500 ML
100-340 PLASTIC BAG, INJECTION (ML) INTRAVENOUS CONTINUOUS PRN
Status: COMPLETED | OUTPATIENT
Start: 2019-09-08 | End: 2019-09-08

## 2019-09-08 RX ORDER — OXYTOCIN 10 [USP'U]/ML
10 INJECTION, SOLUTION INTRAMUSCULAR; INTRAVENOUS
Status: DISCONTINUED | OUTPATIENT
Start: 2019-09-08 | End: 2019-09-10 | Stop reason: HOSPADM

## 2019-09-08 RX ORDER — BUPIVACAINE HCL/0.9 % NACL/PF 0.125 %
PLASTIC BAG, INJECTION (ML) EPIDURAL
Status: COMPLETED
Start: 2019-09-08 | End: 2019-09-08

## 2019-09-08 RX ORDER — ACETAMINOPHEN 325 MG/1
650 TABLET ORAL EVERY 4 HOURS PRN
Status: DISCONTINUED | OUTPATIENT
Start: 2019-09-08 | End: 2019-09-09

## 2019-09-08 RX ORDER — IBUPROFEN 800 MG/1
800 TABLET, FILM COATED ORAL
Status: DISCONTINUED | OUTPATIENT
Start: 2019-09-08 | End: 2019-09-09

## 2019-09-08 RX ORDER — OXYTOCIN 10 [USP'U]/ML
10 INJECTION, SOLUTION INTRAMUSCULAR; INTRAVENOUS
Status: DISCONTINUED | OUTPATIENT
Start: 2019-09-08 | End: 2019-09-09

## 2019-09-08 RX ORDER — BUPIVACAINE HCL/0.9 % NACL/PF 0.125 %
PLASTIC BAG, INJECTION (ML) EPIDURAL CONTINUOUS
Status: DISCONTINUED | OUTPATIENT
Start: 2019-09-08 | End: 2019-09-09

## 2019-09-08 RX ORDER — ONDANSETRON 2 MG/ML
4 INJECTION INTRAMUSCULAR; INTRAVENOUS EVERY 6 HOURS PRN
Status: DISCONTINUED | OUTPATIENT
Start: 2019-09-08 | End: 2019-09-09

## 2019-09-08 RX ORDER — METHYLERGONOVINE MALEATE 0.2 MG/ML
200 INJECTION INTRAVENOUS
Status: DISCONTINUED | OUTPATIENT
Start: 2019-09-08 | End: 2019-09-09

## 2019-09-08 RX ADMIN — Medication: at 18:35

## 2019-09-08 RX ADMIN — Medication 340 ML/HR: at 22:30

## 2019-09-08 RX ADMIN — Medication 340 ML/HR: at 22:31

## 2019-09-08 RX ADMIN — SODIUM CHLORIDE, POTASSIUM CHLORIDE, SODIUM LACTATE AND CALCIUM CHLORIDE: 600; 310; 30; 20 INJECTION, SOLUTION INTRAVENOUS at 17:53

## 2019-09-08 RX ADMIN — IBUPROFEN 800 MG: 800 TABLET ORAL at 23:05

## 2019-09-08 RX ADMIN — Medication 100 ML/HR: at 23:03

## 2019-09-08 RX ADMIN — SODIUM CHLORIDE, POTASSIUM CHLORIDE, SODIUM LACTATE AND CALCIUM CHLORIDE: 600; 310; 30; 20 INJECTION, SOLUTION INTRAVENOUS at 19:12

## 2019-09-08 ASSESSMENT — MIFFLIN-ST. JEOR: SCORE: 1415.39

## 2019-09-08 NOTE — PLAN OF CARE
Data: Patient presented to [facility]: 2019  5:10 PM.  Reason for maternal/fetal assessment is uterine contractions. Patient reports contractions every 2-5 min [id number] today and getting stronger.  Patient is a .  Prenatal record reviewed. Pregnancy has been uncomplicated..  Gestational Age 40w1d. VSS. Fetal movement present. Patient denies leaking of vaginal fluid/rupture of membranes, pelvic pressure, nausea, vomiting, headache, visual disturbances, epigastric or URQ pain, significant edema. Support person is present.  No English spoken primary language [place].  Video  used. SVE done 1 Patient moved to Labor room 4.  Action: Verbal consent for EFM. Triage assessment completed. Bill of rights reviewed.  Response: Patient verbalized agreement with plan. Will contact Dr Frantz Garcia with update and further orders.

## 2019-09-08 NOTE — ANESTHESIA PROCEDURE NOTES
Peripheral nerve/Neuraxial procedure note :        Assessment/Narrative  .  .  . Comments:  Pre-Procedure  Performed by Matthew Nolasco MD  Location: OB.      PreAnesthestic Checklist: patient identified, IV checked, risks and benefits discussed, informed consent obtained, monitors and equipment checked, pre-op evaluation and at physician/surgeon's request.    Timeout   Correct Patient: Yes  Correct Procedure: Epidural catheter placement  Correct Site: Yes   Correct Position: Yes    Procedure Documentation  Procedure:   Epidural catheter block for Labor    Patient currently in labor and she and OBMD request a labor epidural to control her labor pains. Patient was interviewed and examined. Procedure and risks including but not limited to bleeding, infection, nerve injury, paralysis, PDPH, and inadequate block requiring intervention discussed with patient. Questions answered. This epidural is to be placed in anticipation of vaginal delivery.  She consents to the epidural procedure.  Time-out was performed.  I or my partners remain immediately available for management of any issues or complications and will monitor at appropriate intervals.  Procedure: Patient sitting. Betadine prep x 3. Sterile drape applied.  Lidocaine 1%  local infiltration at L 3-4.  17 G. Tuohy needle at L3-4 by loss of resistance into epidural space.  No CSF, paresthesia or blood. 1.5 % Lidocaine with 1:200,000 Epinephrine 5cc test dose. Then 0.25% bupivicaine 10 cc with NS 5 cc.  Epidural catheter inserted w/o resistance to 5 cm in epidural space.  Aspiration negative for blood and CSF.   Negative for neuro change, paresthesia or symptoms of intravascular injection or intrathecal injection.  Infusion orders written and infusion of 0.125% bupivicaine 15cc per hour started.    Matthew Nolasco MD

## 2019-09-08 NOTE — ANESTHESIA PREPROCEDURE EVALUATION
Anesthesia Pre-Procedure Evaluation    Patient: Olive Lima   MRN: [id number] : 1995          Preoperative Diagnosis: * No surgery found *        Past Medical History:   Diagnosis Date     Hyperemesis of pregnancy      Past Surgical History:   Procedure Laterality Date     NO HISTORY OF SURGERY       Anesthesia Evaluation       history and physical reviewed .             ROS/MED HX    ENT/Pulmonary:  - neg pulmonary ROS     Neurologic:  - neg neurologic ROS     Cardiovascular:  - neg cardiovascular ROS       METS/Exercise Tolerance:     Hematologic:         Musculoskeletal:         GI/Hepatic:  - neg GI/hepatic ROS       Renal/Genitourinary:         Endo:         Psychiatric:         Infectious Disease:         Malignancy:         Other:                     neg OB ROS            Physical Exam  Normal systems: cardiovascular, pulmonary and dental    Airway   Mallampati: II  TM distance: > 3 FB  Neck ROM: full  Mouth opening: > 3 cm    Dental     Cardiovascular       Pulmonary             Lab Results   Component Value Date    WBC 5.9 2019    HGB 14.3 2019    HCT 36.2 2019     2019     2019    POTASSIUM 2.9 (L) 2019    CHLORIDE 101 2019    CO2 20 2019    BUN 9 2019    CR 0.52 2019    GLC 92 2019    CARLOS ENRIQUE 8.9 2019    ALBUMIN 4.3 2019    PROTTOTAL 8.1 2019    ALT 16 2019    AST 15 2019    ALKPHOS 52 2019    BILITOTAL 0.5 2019    LIPASE 246 2019       Preop Vitals  BP Readings from Last 3 Encounters:   19 113/72   19 108/70   19 122/72    Pulse Readings from Last 3 Encounters:   19 85   19 85   19 108      Resp Readings from Last 3 Encounters:   19 18   19 12    SpO2 Readings from Last 3 Encounters:   19 100%   19 99%      Temp Readings from Last 1 Encounters:   19 98.1  F (36.7  C) (Oral)    Ht Readings from Last 1  "Encounters:   09/08/19 1.676 m (5' 6\")      Wt Readings from Last 1 Encounters:   09/08/19 64.9 kg (143 lb)    Estimated body mass index is 23.08 kg/m  as calculated from the following:    Height as of this encounter: 1.676 m (5' 6\").    Weight as of this encounter: 64.9 kg (143 lb).       Anesthesia Plan      History & Physical Review  History and physical reviewed and following examination; no interval change.    ASA Status:  2 .  OB Epidural Asa: 2       Plan for Epidural          Postoperative Care      Consents  Anesthetic plan, risks, benefits and alternatives discussed with:  Patient and Patient..                 Matthew Nolasco MD                    .  "

## 2019-09-08 NOTE — PROVIDER NOTIFICATION
09/08/19 1811   Provider Notification   Provider Name/Title Dr Garcia   Method of Notification Phone   Request Evaluate - Remote   Notification Reason Patient Arrived;Labor Status;Uterine Activity;Pain;SVE    Updated Dr Garcia that the patient had arrived through the ED in active labor. SVE at 5/90/-1, contractions every 2-4 minutes apart.  is ok with the plan to keep the patient. Intrapartum orders received.

## 2019-09-09 LAB — T PALLIDUM AB SER QL: NONREACTIVE

## 2019-09-09 PROCEDURE — 12000000 ZZH R&B MED SURG/OB

## 2019-09-09 PROCEDURE — 25000132 ZZH RX MED GY IP 250 OP 250 PS 637: Performed by: OBSTETRICS & GYNECOLOGY

## 2019-09-09 RX ADMIN — IBUPROFEN 800 MG: 800 TABLET ORAL at 13:19

## 2019-09-09 RX ADMIN — SENNOSIDES AND DOCUSATE SODIUM 1 TABLET: 8.6; 5 TABLET ORAL at 09:03

## 2019-09-09 RX ADMIN — IBUPROFEN 800 MG: 800 TABLET ORAL at 20:43

## 2019-09-09 RX ADMIN — IBUPROFEN 800 MG: 800 TABLET ORAL at 05:41

## 2019-09-09 NOTE — PROVIDER NOTIFICATION
09/08/19 2035   Provider Notification   Provider Name/Title Jose   Method of Notification In Department   Request Evaluate - Remote   Notification Reason SVE   MD in department. Updated as to recent SVE and presence of bulging forebag.

## 2019-09-09 NOTE — PLAN OF CARE
Assumed care at 1500. Vital signs stable.  Pain controlled with Ibuprofen.  Pt is ambulating well, voiding without difficulty and independent in cares for self and baby.  Pt is breastfeeding, going well. FOB at bedside and attentive to pt's needs.

## 2019-09-09 NOTE — PLAN OF CARE
Pt meeting expected outcomes. Vitals stable. Fundus firm and midline. Denies difficulty voiding. Pain controlled with ibuprofen. Independent with self cares. Needs assistance breastfeeding . Bonding well with baby.

## 2019-09-09 NOTE — PROGRESS NOTES
"[facility] Postpartum Note    S:  Patient without complaints.  Minimal lochia.    O:  Blood pressure 105/66, pulse 90, temperature 98.2  F (36.8  C), temperature source Oral, resp. rate 14, height 1.676 m (5' 6\"), weight 64.9 kg (143 lb), last menstrual period 12/06/2018, SpO2 99 %, unknown if currently breastfeeding.        Urine output adequate        Abdomen - Fundus firm, at umbilicus, nontender        Extremities - No calf tenderness    A:   Postpartum Day# 1, s/p Vaginal delivery - doing well    P:  1)  Routine care        2)  Probable D/C tomorrow      Cesar Viveros MD          "

## 2019-09-09 NOTE — PROVIDER NOTIFICATION
09/08/19 2021   Provider Notification   Provider Name/Title Jose   Method of Notification In Department   Request Evaluate - Remote   Notification Reason Status Update   MD in department. Notified of SROM and most recent SVE.

## 2019-09-09 NOTE — H&P
[facility] Labor and Delivery History and Physical    Olive Lima MRN# [id number]   Age: 23 year old YOB: 1995     Date of Admission:  2019    Primary care provider: No Ref-Primary, Physician           Chief Complaint:   Olive Lima is a 23 year old [place] female  Estimated Date of Delivery: Sep 7, 2019  who is 40w1d pregnant and being admitted for active labor management and SROM at 1938 with reportedly clear fluid.  Pt arrived thru the ER at which time SVE was 5 cm  90%  -1 sta with ctx's q 2-4 min.  Prenatal course was remarkable for fetal PAC's eval by MFM and hyperemesis           Pregnancy history:     OBSTETRIC HISTORY:    OB History    Para Term  AB Living   1 0 0 0 0 0   SAB TAB Ectopic Multiple Live Births   0 0 0 0 0      # Outcome Date GA Lbr [place]/2nd Weight Sex Delivery Anes PTL Lv   1 Current                EDC: Estimated Date of Delivery: 19    Prenatal Labs:   Lab Results   Component Value Date    ABO B 2019    RH Pos 2019    AS Neg 2019    HEPBANG Nonreactive 2019    CHPCRT Negative 2019    GCPCRT Negative 2019    HGB 14.3 2019       GBS Status:   Lab Results   Component Value Date    GBS Negative 2019       Active Problem List  Patient Active Problem List   Diagnosis     Hyperemesis gravidarum     Supervision of normal first pregnancy, antepartum     Fetal arrhythmia affecting pregnancy, antepartum     Indication for care in labor or delivery       Medication Prior to Admission  Medications Prior to Admission   Medication Sig Dispense Refill Last Dose     PRENATA 29-1 MG CHEW TAKE ONE TAB BY MOUTH DAILY  0 Past Week at Unknown time   .        Maternal Past Medical History:     Past Medical History:   Diagnosis Date     Hyperemesis of pregnancy                        Family History:   I have reviewed this patient's family history            Social History:   I have reviewed this  patient's social history         Review of Systems:   CONSTITUTIONAL: NEGATIVE for fever, chills, change in weight  ENT/MOUTH: NEGATIVE for ear, mouth and throat problems  RESP: NEGATIVE for significant cough or SOB  CV: NEGATIVE for chest pain, palpitations or peripheral edema          Physical Exam:   Vitals were reviewed  All vitals stable  Temp: 98.4  F (36.9  C) Temp src: Oral BP: 118/73 Pulse: 85   Resp: 18 SpO2: 99 %      Constitutional:   awake, alert, cooperative, no apparent distress, and appears stated age     Eyes:   Lids and lashes normal, pupils equal, round and reactive to light, extra ocular muscles intact, sclera clear, conjunctiva normal     ENT:   Normocephalic, without obvious abnormality, atraumatic, sinuses nontender on palpation, external ears without lesions, oral pharynx with moist mucous membranes, tonsils without erythema or exudates, gums normal and good dentition.     Neck:   Supple, symmetrical, trachea midline, no adenopathy, thyroid symmetric, not enlarged and no tenderness, skin normal     Hematologic / Lymphatic:   no cervical lymphadenopathy and no supraclavicular lymphadenopathy     Back:   Symmetric, no curvature, spinous processes are non-tender on palpation, paraspinous muscles are non-tender on palpation, no costal vertebral tenderness     Lungs:   No increased work of breathing, good air exchange, clear to auscultation bilaterally, no crackles or wheezing     Cardiovascular:   Normal apical impulse, regular rate and rhythm, normal S1 and S2, no S3 or S4, and no murmur noted     Abdomen:   No scars, normal bowel sounds, soft, gravid uterus [place] infant vtx  EFW 7#, non-tender, no masses palpated, no hepatosplenomegally     Genitounirinary:   External Genitalia:  General appearance; normal      Cervix:   Membranes: AROM of forebag with   meconium stained fluid   Dilation: 10   Effacement: 100%   Station:+1   Consistency: soft   Position: Mid  Presentation:Cephalic  Fetal  Heart Rate Tracing: Tier 1 (normal)  Tocometer: external monitor and adequate                       Assessment:   Olive Lima is a 40w1d pregnant female admitted with active labor management, observation, AROM and NNP for delivery  Significance of Mec staining discussed   All ?'s answered.          Plan:   Admit - see IP orders  Anticipate     Frantz Garcia MD

## 2019-09-09 NOTE — PLAN OF CARE
Cares assumed at 1100. Stable patient meeting expected goals. Pain being managed with ibuprofen. Breastfeeding infant. Independent with self and  cares. Significant other at bedside and interpreting throughout shift.

## 2019-09-09 NOTE — ANESTHESIA POSTPROCEDURE EVALUATION
Patient: Olive Lima    * No procedures listed *    Diagnosis:* No pre-op diagnosis entered *  Diagnosis Additional Information: No value filed.    Anesthesia Type:  Epidural    Note:  Anesthesia Post Evaluation         Comments:     S/P epidural for labor.   I or my partner was immediately available for management of this patient during epidural analgesia infusion.  VSS.  Doing well. Block resolved.  Neuro at baseline. Denies positional headache. Minimal side effects easily managed w/ PRN meds. No apparent anesthetic complications. No follow-up required.    Marco Perdomo MD        Last vitals:  Vitals:    09/09/19 0048 09/09/19 0336 09/09/19 0752   BP: 133/60 105/66 116/75   Pulse:  90 71   Resp:  14 16   Temp:  98.2  F (36.8  C) 97.5  F (36.4  C)   SpO2:            Electronically Signed By: Marco Perdomo MD  September 9, 2019  8:20 AM

## 2019-09-09 NOTE — ADDENDUM NOTE
Addendum  created 09/08/19 1929 by Matthew Nolasco MD    Intraprocedure Event edited       APER 2MON/204d

## 2019-09-09 NOTE — LACTATION NOTE
This note was copied from a baby's chart.  LC visit. Her baby was latched on the left side when LC entered the room. Infant was awake and latched well, but would comfort suck occasionally.  LC demonstrated breast compressions to keep infant in a more nutritive suck pattern and parents acknowledged the difference.  Swallows pointed out.  LC recommended exclusive breastfeeding today and explained the significance of colostrum.  She is aware she may call for latch assistance prn.

## 2019-09-09 NOTE — PLAN OF CARE
Data: Olive iLma transferred to [facility] via wheelchair at 0130. Baby transferred via parent's arms.  Action: Receiving unit notified of transfer: Yes. Patient and family notified of room change. Report given to MIKE Alba at 0135. Belongings sent to receiving unit. Accompanied by Registered Nurse. Oriented patient to surroundings. Call light within reach. ID bands double-checked with receiving RN.  Response: Patient tolerated transfer and is stable.    Patients mobililty level scored using the bedside mobility assistance tool (BMAT). Patient is at a mobility level test number: 4. Mobility equipment used: none required. Required assist of 0 staff members. Further use of BMAT scoring not required.

## 2019-09-09 NOTE — PLAN OF CARE
UAL. VSS. NO  available face to face this morning.  at bedside and interpreting for Olive. Denies pain this morning. States she is voiding without difficultly. Both parents attentive to baby. Meeting expected outcomes.

## 2019-09-09 NOTE — PROGRESS NOTES
Delivery Summary    Olive Lima MRN# [id number]   Age: 23 year old YOB: 1995     ASSESSMENT & PLAN: routine postpartum cares       Labor Event Times    Labor onset date:  19 Onset time:   4:45 PM   Dilation complete date:  19 Complete time:   8:45 PM   Start pushing date/time:  2019 2057      Labor Events     labor?:  No  Labor Type:  Spontaneous     Antibiotics received during labor?:  No     Rupture identifier:  Sac 1  Rupture date/time:     Rupture type:  Artificial Rupture of Membranes  Fluid color:  Meconium  Fluid odor:  Normal     1:1 continuous labor support provided by?:  RN       Delivery/Placenta Date and Time    Delivery Date:  19 Delivery Time:  10:27 PM   Placenta Date/Time:  2019 10:29 PM  Oxytocin given at the time of delivery:  after delivery of placenta     Vaginal Counts     Initial count performed by 2 team members:   Two Team Members   Dr. Jose Stern, RN       Needles Suture Canmer Sponges Instruments   Initial counts 2  5    Added to count  1     Final counts       Placed during labor Accounted for at the end of labor   No NA   No NA   No NA        Final count correct?:  Yes     Apgars    Living status:  Living   1 Minute 5 Minute 10 Minute 15 Minute 20 Minute   Skin color: 0  1       Heart rate: 2  2       Reflex irritability: 2  2       Muscle tone: 2  2       Respiratory effort: 2  2       Total: 8  9          Cord    Vessels:  3 Vessels Complications:  Nuchal   Cord Blood Disposition:  Lab Gases Sent?:  No       Resuscitation    Methods:  None  [place] Care at Delivery:  NICU present for delivery.  Infant suctioned on perineum and cried spontaneously     Skin to Skin and Feeding Plan    Skin to skin initiation date/time: 1841    Skin to skin with:  Mother  Skin to skin end date/time:     How do you plan to feed your baby:  Breastfeeding     Labor Events and Shoulder Dystocia    Fetal Tracing Prior to Delivery:  Category  1  Shoulder dystocia present?:  Neg     Delivery (Maternal) (Provider to Complete) (855652)    Episiotomy:  None  Perineal lacerations:  2nd Repaired?:  Yes   Vaginal laceration?:  No    Cervical laceration?:  No       Blood Loss  Mother: Olive Lima #[id number]   Start of Mother's Information    IO Blood Loss  09/08/19 1645 - 09/08/19 2244    None           End of Mother's Information  Mother: Olive Lima #[id number]         Delivery - Provider to Complete (149341)    Delivering clinician:  Frantz Garcia MD  Attempted Delivery Types (Choose all that apply):  Spontaneous Vaginal Delivery  Delivery Type (Choose the 1 that will go to the Birth History):  Vaginal, Spontaneous   Other personnel:   Provider Role   Frantz Garcia MD Physician   Bronwyn Stern RN Registered Nurse   Aracelis Valerio RN          Placenta    Delayed Cord Clamping:  Done  Date/Time:  9/8/2019 10:29 PM  Removal:  Spontaneous  Disposition:  Hospital disposal     Anesthesia    Method:  Epidural          Presentation and Position    Presentation:  Vertex   Occiput Anterior           Frantz Garcia MD

## 2019-09-10 VITALS
RESPIRATION RATE: 16 BRPM | SYSTOLIC BLOOD PRESSURE: 115 MMHG | HEART RATE: 95 BPM | HEIGHT: 66 IN | DIASTOLIC BLOOD PRESSURE: 75 MMHG | OXYGEN SATURATION: 99 % | BODY MASS INDEX: 22.98 KG/M2 | TEMPERATURE: 98.1 F | WEIGHT: 143 LBS

## 2019-09-10 PROCEDURE — 25000132 ZZH RX MED GY IP 250 OP 250 PS 637: Performed by: OBSTETRICS & GYNECOLOGY

## 2019-09-10 RX ORDER — IBUPROFEN 800 MG/1
800 TABLET, FILM COATED ORAL EVERY 6 HOURS PRN
Qty: 30 TABLET | Refills: 1 | Status: SHIPPED | OUTPATIENT
Start: 2019-09-10 | End: 2019-10-23

## 2019-09-10 RX ADMIN — IBUPROFEN 800 MG: 800 TABLET ORAL at 09:22

## 2019-09-10 RX ADMIN — SENNOSIDES AND DOCUSATE SODIUM 1 TABLET: 8.6; 5 TABLET ORAL at 09:23

## 2019-09-10 NOTE — PLAN OF CARE
Vital signs stable. Pain controlled with Ibuprofen.   Pt is independent in all cares, ambulating well and voiding without difficulty. No s/s of infection noted. Pt is having some nipple soreness today from breastfeeding, nipple cream and hydrogel pads given, lactation working with pt.   Pt bonding well with baby.  at bedside and supportive. Discharge instructions and education reviewed with pt and her  with  at bedside. All questions and concerns addressed.  Pt verbalized understanding. Breast pump given per pt request.  Home care referral completed. Pt discharged home in stable condition with all of her belongings via ambulation accompanied by her  at 1420.

## 2019-09-10 NOTE — DISCHARGE INSTRUCTIONS
Postpartum Vaginal Delivery Instructions  [facility]:  [phone]  [facility]:  [phone]    Activity       Ask family and friends for help when you need it.    Do not place anything in your vagina for 6 weeks.    You are not restricted on other activities, but take it easy for a few weeks to allow your body to recover from delivery.  You are able to do any activities you feel up to that point.    No driving until you have stopped taking your pain medications (usually two weeks after delivery).     Call your health care provider if you have any of these symptoms:       Increased pain, swelling, redness, or fluid around your stiches from an episiotomy or perineal tear.    A fever above 100.4 F (38 C) with or without chills when placing a thermometer under your tongue.    You soak a sanitary pad with blood within 1 hour, or you see blood clots larger than a golf ball.    Bleeding that lasts more than 6 weeks.    Vaginal discharge that smells bad.    Severe pain, cramping or tenderness in your lower belly area.    A need to urinate more frequently (use the toilet more often), more urgently (use the toilet very quickly), or it burns when you urinate.    Nausea and vomiting.    Redness, swelling or pain around a vein in your leg.    Problems breastfeeding or a red or painful area on your breast.    Chest pain and cough or are gasping for air.    Problems coping with sadness, anxiety, or depression.  If you have any concerns about hurting yourself or the baby, call your provider immediately.     You have questions or concerns after you return home.     Keep your hands clean:  Always wash your hands before touching your perineal area and stitches.  This helps reduce your risk of infection.  If your hands aren't dirty, you may use an alcohol hand-rub to clean your hands. Keep your nails clean and short.

## 2019-09-10 NOTE — LACTATION NOTE
This note was copied from a baby's chart.  LC visit and assist with infant latch.   present.  She was previously complaining of pain with latching.  LC demonstrated how to latch her baby deeply in the cross cradle hold with breast compressions.  She was able to mimic positioning and reported an increase in comfort.  LC also provided breastfeeding education including avoiding bottles in the first few weeks until breastfeeding is well established, pumping indications and care, breastmilk storage and bottle preparation.  All questions were answered.  She is aware she may call lactation prn.

## 2019-09-10 NOTE — DISCHARGE SUMMARY
VAGINAL DELIVERY DISCHARGE SUMMARY    Admit date: 2019  Discharge date: 9/10/2019     Admit Dx:   - 23 year old  at 40w1d    - SROM    Discharge Dx:  - Same as above, s/p     Procedures:  - Spontaneous vaginal delivery  - Epidural analgesia    Admit HPI:  Olive Lima is a 23 year old Haitian female  Estimated Date of Delivery: Sep 7, 2019  who is 40w1d pregnant and being admitted for active labor management and SROM at 1938 with reportedly clear fluid.  Pt arrived thru the ER at which time SVE was 5 cm  90%  -1 sta with ctx's q 2-4 min.  Prenatal course was remarkable for fetal PAC's eval by MFM and hyperemesis.  Please see her admit H&P for full details of her PMH, PSH, Meds, Allergies and exam on admit.    Hospital course:   Please see her Delivery Summary for full details regarding her delivery.    Her postpartum course was complicated by nothing. On PPD#2, she was meeting all of her postpartum goals and deemed stable for discharge. She was voiding without difficulty, tolerating a regular diet without nausea and vomiting, her pain was well controlled on oral pain medicines and her lochia was appropriate. Her hemoglobin after delivery was not rechecked and she was hemodynamically stable. Her Rh status was pos, and Rhogam was not indicated. She was rubella immune.  At the time of discharge, she was breast feeding her infant and considering nexplanon for contraception.    Physical exam on the day of discharge:  Vitals:    19 0336 19 0752 19 1707 19 2048   BP: 105/66 116/75 127/76 118/73   Pulse: 90 71 85 79   Resp: 14 16 16 16   Temp: 98.2  F (36.8  C) 97.5  F (36.4  C) 97.7  F (36.5  C) 98.3  F (36.8  C)   TempSrc: Oral Oral Oral Oral   SpO2:       Weight:       Height:           General: sitting up, alert and cooperative  Abd: soft, non-distended, non-tender. Fundus firm, nontender, 1 cm below umbilicus.   Extremities: calves nontender, trace edema of lower  extremities bilaterally    Lab Results   Component Value Date    HGB 14.3 09/08/2019    HGB 12.1 06/21/2019     Blood type:   Lab Results   Component Value Date    RH Pos 09/08/2019       Discharge/Disposition:  Ms. Olive Lima was discharged to home in stable condition with the following instructions/medications:  1) Call for temperature > 100.4, foul smelling vaginal discharge, bleeding > 1 pad per hour x 2 hrs, pain not controlled by oral pain meds, severe constipation or severe nausea or vomiting.  2) Contraception counseling was provided.  3) She was instructed to follow-up with her primary OB in 6 weeks for a routine postpartum visit and possible nexplanon placement.  4) She was instructed to continue her PNV on discharge if she wished to breast feed her infant.  5) She was discharged home with the following medications: ibuprofen.    Elle Prasad MD, MPH  [facility]

## 2019-10-22 ENCOUNTER — OFFICE VISIT (OUTPATIENT)
Dept: INTERNAL MEDICINE | Facility: CLINIC | Age: 24
End: 2019-10-22
Payer: COMMERCIAL

## 2019-10-22 VITALS
BODY MASS INDEX: 20.21 KG/M2 | DIASTOLIC BLOOD PRESSURE: 60 MMHG | RESPIRATION RATE: 16 BRPM | SYSTOLIC BLOOD PRESSURE: 100 MMHG | HEART RATE: 68 BPM | WEIGHT: 125.2 LBS | TEMPERATURE: 98.1 F | OXYGEN SATURATION: 98 %

## 2019-10-22 DIAGNOSIS — Z53.9 ERRONEOUS ENCOUNTER--DISREGARD: Primary | ICD-10-CM

## 2019-10-22 NOTE — PROGRESS NOTES
"Midwife Postpartum 6 Week Visit    Olive Lima is a 24 year old here for a postpartum checkup. Olive is here with spouse who is acting as .    Delivery date was 19. 40w1d. She had a  of a viable girl, named Christelle, weight 7 pounds 1.6 oz. with no complications      Since delivery, she has been breast feeding and supplementing with formula.  She has not had any signs of infection, her lochia stopped after 5 weeks.  She has not had other complications.      She is voiding and having bowel movements without difficulty.       Contraception was discussed and patient desires none.   She  has not had intercourse since delivery.   She complains of trace  perineal discomfort.     Mood is Stable  Patient screened for postpartum depression.   Depression Rating was:   Last PHQ-9 score on record = No flowsheet data found.  Last GAD7 score on record = No flowsheet data found.    ROS:  12 point review of systems negative other than symptoms noted below.       Current Outpatient Medications:      PRENATA 29-1 MG CHEW, TAKE ONE TAB BY MOUTH DAILY, Disp: , Rfl: 0.   OB History    Para Term  AB Living   1 1 1 0 0 1   SAB TAB Ectopic Multiple Live Births   0 0 0 0 1      # Outcome Date GA Lbr [place]/2nd Weight Sex Delivery Anes PTL Lv   1 Term 19 40w1d 04:00 / 01:42 3.22 kg (7 lb 1.6 oz) F Vag-Spont EPI N DYLAN      Name: Christelle      Apgar1: 8  Apgar5: 6     Last pap:    Lab Results   Component Value Date    PAP NIL 2019     Hgb in hospital was 14.3    EXAM:  BP 92/68   Pulse 72   Ht 1.676 m (5' 6\")   Wt 56.2 kg (124 lb)   LMP 2018 (Exact Date)   BMI 20.01 kg/m    BMI: Body mass index is 20.01 kg/m .  Constitutional: healthy, alert and no distress  Neck: symmetrical, thyroid normal size, no masses present, no lymphadenopathy present.   Breast: deferred, patient lactating.  Abdomen: soft, non-tender, diastasis 1 FB's    PELVIC EXAM:  Vulva: No lesions, well healed, BUS WNL, no " tenderness  Vagina: Moist, pink, discharge normal  well rugated, no lesions  Cervix:smooth, pink, no visible lesions  Uterus: Involuted to normal size, non-tender, no masses palpated  Ovaries: No masses palpated  Rectal exam: deferred      ASSESSMENT:   Normal postpartum exam after .  No diagnosis found.      PLAN:  Results for orders placed or performed during the hospital encounter of 19   Treponema Abs w Reflex to RPR and Titer   Result Value Ref Range    Treponema Antibodies Nonreactive NR^Nonreactive   Hemoglobin   Result Value Ref Range    Hemoglobin 14.3 11.7 - 15.7 g/dL   ABO and Rh   Result Value Ref Range    ABO B     RH(D) Pos     Specimen Expires 2019        Return as needed or at time of next expected pap, pelvic, or breast exam.  Teaching: exercise, birth control and weight/diet  Family Planning:unsure, discussed POPs, hormonal and non-hormonal IUDs, and Nexplanon  Encourage Kegels and abdominal exercise.  Continue a multivitamin/prenatal supplement, especially if breastfeeding.  Pap smear was not obtained today. Last pap 19 negative.    Return to clinic:  PRN if contraception desired, otherwise return to clinic 3 months for annual exam    Dionne SHAH, MCKENNA    Spouse present during entire visit acting as

## 2019-10-22 NOTE — PROGRESS NOTES
Patient came to clinic for contraception. She has been rescheduled for post-partum follow up apt with OBGYN and will review this a this apt.

## 2019-10-23 ENCOUNTER — PRENATAL OFFICE VISIT (OUTPATIENT)
Dept: OBGYN | Facility: CLINIC | Age: 24
End: 2019-10-23
Payer: COMMERCIAL

## 2019-10-23 VITALS
DIASTOLIC BLOOD PRESSURE: 68 MMHG | HEART RATE: 72 BPM | SYSTOLIC BLOOD PRESSURE: 92 MMHG | BODY MASS INDEX: 19.93 KG/M2 | HEIGHT: 66 IN | WEIGHT: 124 LBS

## 2019-10-23 PROBLEM — Z34.00 SUPERVISION OF NORMAL FIRST PREGNANCY, ANTEPARTUM: Status: RESOLVED | Noted: 2019-02-21 | Resolved: 2019-10-23

## 2019-10-23 PROBLEM — O21.0 HYPEREMESIS GRAVIDARUM: Status: RESOLVED | Noted: 2019-02-07 | Resolved: 2019-10-23

## 2019-10-23 PROBLEM — O36.8390 FETAL ARRHYTHMIA AFFECTING PREGNANCY, ANTEPARTUM: Status: RESOLVED | Noted: 2019-06-22 | Resolved: 2019-10-23

## 2019-10-23 PROCEDURE — 99207 ZZC POST PARTUM EXAM: CPT | Performed by: NURSE PRACTITIONER

## 2019-10-23 ASSESSMENT — ANXIETY QUESTIONNAIRES
7. FEELING AFRAID AS IF SOMETHING AWFUL MIGHT HAPPEN: NOT AT ALL
2. NOT BEING ABLE TO STOP OR CONTROL WORRYING: NOT AT ALL
GAD7 TOTAL SCORE: 0
6. BECOMING EASILY ANNOYED OR IRRITABLE: NOT AT ALL
5. BEING SO RESTLESS THAT IT IS HARD TO SIT STILL: NOT AT ALL
IF YOU CHECKED OFF ANY PROBLEMS ON THIS QUESTIONNAIRE, HOW DIFFICULT HAVE THESE PROBLEMS MADE IT FOR YOU TO DO YOUR WORK, TAKE CARE OF THINGS AT HOME, OR GET ALONG WITH OTHER PEOPLE: NOT DIFFICULT AT ALL
3. WORRYING TOO MUCH ABOUT DIFFERENT THINGS: NOT AT ALL
1. FEELING NERVOUS, ANXIOUS, OR ON EDGE: NOT AT ALL

## 2019-10-23 ASSESSMENT — PATIENT HEALTH QUESTIONNAIRE - PHQ9
SUM OF ALL RESPONSES TO PHQ QUESTIONS 1-9: 0
5. POOR APPETITE OR OVEREATING: NOT AT ALL

## 2019-10-23 ASSESSMENT — MIFFLIN-ST. JEOR: SCORE: 1324.21

## 2019-10-23 NOTE — PATIENT INSTRUCTIONS
Patient Education     Birth Control Methods  Birth control methods are used to help prevent pregnancy. There are many different methods to choose from. Talk to your healthcare provider about which method is right for you. Be sure to ask your provider about the effectiveness of each method. Also ask about the benefits, risks, and side effects of each method.  Hormones  Some birth control methods work by releasing hormones such as progestin and estrogen. These methods include hormone implants, hormone shots, the vaginal ring, the patch, and birth control pills. They all work by stopping ovulation (release of the egg from the ovary). The implant is a small device that needs to be placed in the upper arm by a trained healthcare provider. It works for up to 3 years. Hormone injections must be repeated every 3 months. The vaginal ring must be replaced monthly (it can be removed during the fourth week of each cycle). The patch must be replaced weekly (it is not worn during the fourth week of each cycle). Birth control pills must be taken every day. All of these methods are effective and can be stopped at any time.  Intrauterine device (IUD)  An IUD is a small, T-shaped device. It must be placed in the uterus by a trained healthcare provider. There are different types of IUDs available. They work by causing changes in the uterus that make it harder for sperm to reach the egg. Depending on the type of IUD you have, it may work for several years or longer. The IUD is a reversible birth control method. This means it can be removed at any time.  Condom  A condom is a sheath that forms a thin barrier between the penis and the vagina. It helps prevent pregnancy by keeping sperm from entering the vagina. When latex condoms are used, they have the added benefit of protecting against most STIs (sexually transmitted infections). Condoms are used each time there is sexual intercourse and should be discarded after each use. Ask your  healthcare provider about the different types of condoms available. These include both the male condom and female condom.  Spermicide  Spermicides come as foams, jellies, creams, suppositories, and tablets.  They help prevent pregnancy by killing sperm. When used alone they are not that reliable. They work best when combined with other birth control methods such as diaphragms and cervical caps.  Sponge, diaphragm, and cervical cap  All of these methods help prevent pregnancy by covering the opening of the uterus (cervix). This prevents sperm from passing through.  The sponge contains spermicide. It can be bought over the counter. The sponge must be left in place for at least 6 hours after the last time you have sex. However, it should not stay in place for more than 24 hours. It should be discarded after it is used.  The diaphragm and cervical cap must be fitted and prescribed by your healthcare provider. Both are used with spermicide. The diaphragm must be left in place for at least 6 hours after sex. However, it should not stay in place for more than 24 hours. It can be washed and reused. The cervical cap must be left in place for at least 6 hours after sex. However, it should not stay in place for more than 48 hours. It can be washed and reused.  Withdrawal method  This is when the man pulls his penis out of the vagina just before ejaculation ( coming ). This lowers the amount of sperm entering the vagina. Be aware that fluids released just before ejaculation often still contain some sperm, so this method is not as reliable as certain other methods.  Rhythm method  This method requires that you know when in your menstrual cycle you are likely to become pregnant. Then, you avoid sex during those days. This requires careful planning and good discipline. Your healthcare provider can explain more about how this works.  Tubal ligation and vasectomy  These are surgical methods to prevent pregnancy. Tubal ligation is an  option for women. The fallopian tubes are blocked or cut (ligated). This keeps the egg from passing into the uterus or sperm from reaching the egg. Vasectomy is an option for men. The tubes that normally carry sperm to the penis are either closed or blocked. Both tubal ligation and vasectomy are permanent birth control methods. This means reversal is either not possible or unlikely to work. They are good choices for women and men who know that they do not want to have children in the future.  Date Last Reviewed: 11/1/2017 2000-2018 [employer]. [address] [place] [address]. All rights reserved. This information is not intended as a substitute for professional medical care. Always follow your healthcare professional's instructions.           Patient Education     Birth Control: IUD (Intrauterine Device)    The IUD (intrauterine device) is small, flexible, and T-shaped. A trained healthcare provider places it in the uterus. The IUD is one of the most effective birth control methods. It is also reversible. This means it can be removed at any time by a trained healthcare provider. New IUDs are safe and do not have the risks of older types of IUDs.  Pregnancy rates  Talk to your healthcare provider about the effectiveness of this birth control method.  Types of IUDs  IUD insertion is done in the healthcare provider s office. Two types of IUDs are available:    The copper IUD releases a small amount of copper into the uterus. The copper makes it harder for sperm to reach the egg. The device works for at least 10 years.    The progestin IUD releases a hormone called progestin. It causes changes in the uterus to help prevent pregnancy. The device works for 3 to 5 years, depending on which device is chosen. It may be recommended for women who have anemia or heavy and painful periods.  IUDs have thin strings that hang from the opening of the uterus into the vagina. This lets you check that the  IUD stays in place.  Things to know about IUDs    IUDs can be used by women who have never been pregnant or by women with a history of sexually transmitted infections (STIs) or tubal pregnancy.    It won't move from the uterus to any other part of the body.    There is a slight risk of the device coming out of the vagina (expulsion).    It may not work in women who have an abnormally shaped uterus.    A copper IUD may cause heavier periods and cramping.    Progestin IUD may cause light periods or no periods at all (irregular bleeding or spotting is possible and normal during first 3 to 6 months).    If you get a sexually transmitted infection with an IUD in place, symptoms may be more severe.  When to call your healthcare provider  Be sure your healthcare provider knows if you have:    A sexually transmitted infection (STI) or possible STI    Liver problems    Blood clots (for progestin IUD only)    Breast cancer or a history of breast cancer (progestin IUD only)   Date Last Reviewed: 3/1/2017    0956-1836 [employer]. [address] [place] [address]. All rights reserved. This information is not intended as a substitute for professional medical care. Always follow your healthcare professional's instructions.           Patient Education     Etonogestrel implant  What is this medicine?  ETONOGESTREL (et oh queta JUAN trel) is a contraceptive (birth control) device. It is used to prevent pregnancy. It can be used for up to 3 years.  How should I use this medicine?  This device is inserted just under the skin on the inner side of your upper arm by a health care professional.  Talk to your pediatrician regarding the use of this medicine in children. Special care may be needed.  What side effects may I notice from receiving this medicine?  Side effects that you should report to your doctor or health care professional as soon as possible:    allergic reactions like skin rash, itching or hives, swelling  of the face, lips, or tongue    breast lumps    changes in emotions or moods    depressed mood    heavy or prolonged menstrual bleeding    pain, irritation, swelling, or bruising at the insertion site    scar at site of insertion    signs of infection at the insertion site such as fever, and skin redness, pain or discharge    signs of pregnancy    signs and symptoms of a blood clot such as breathing problems; changes in vision; chest pain; severe, sudden headache; pain, swelling, warmth in the leg; trouble speaking; sudden numbness or weakness of the face, arm or leg    signs and symptoms of liver injury like dark yellow or brown urine; general ill feeling or flu-like symptoms; light-colored stools; loss of appetite; nausea; right upper belly pain; unusually weak or tired; yellowing of the eyes or skin    unusual vaginal bleeding, discharge    signs and symptoms of a stroke like changes in vision; confusion; trouble speaking or understanding; severe headaches; sudden numbness or weakness of the face, arm or leg; trouble walking; dizziness; loss of balance or coordination  Side effects that usually do not require medical attention (report to your doctor or health care professional if they continue or are bothersome):    acne    back pain    breast pain    changes in weight    dizziness    general ill feeling or flu-like symptoms    headache    irregular menstrual bleeding    nausea    sore throat    vaginal irritation or inflammation  What may interact with this medicine?  Do not take this medicine with any of the following medications:    amprenavir    fosamprenavir  This medicine may also interact with the following medications:    acitretin    aprepitant    armodafinil    bexarotene    bosentan    carbamazepine    certain medicines for fungal infections like fluconazole, ketoconazole, itraconazole and voriconazole    certain medicines to treat hepatitis, HIV or  AIDS    cyclosporine    felbamate    griseofulvin    lamotrigine    modafinil    oxcarbazepine    phenobarbital    phenytoin    primidone    rifabutin    rifampin    rifapentine    Heather's wort    topiramate  What if I miss a dose?  This does not apply.  Where should I keep my medicine?  This drug is given in a hospital or clinic and will not be stored at home.  What should I tell my health care provider before I take this medicine?  They need to know if you have any of these conditions:    abnormal vaginal bleeding    blood vessel disease or blood clots    breast, cervical, endometrial, ovarian, liver, or uterine cancer    diabetes    gallbladder disease    heart disease or recent heart attack    high blood pressure    high cholesterol or triglycerides    kidney disease    liver disease    migraine headaches    seizures    stroke    tobacco smoker    an unusual or allergic reaction to etonogestrel, anesthetics or antiseptics, other medicines, foods, dyes, or preservatives    pregnant or trying to get pregnant    breast-feeding  What should I watch for while using this medicine?  This product does not protect you against HIV infection (AIDS) or other sexually transmitted diseases.  You should be able to feel the implant by pressing your fingertips over the skin where it was inserted. Contact your doctor if you cannot feel the implant, and use a non-hormonal birth control method (such as condoms) until your doctor confirms that the implant is in place. Contact your doctor if you think that the implant may have broken or become bent while in your arm.  You will receive a user card from your health care provider after the implant is inserted. The card is a record of the location of the implant in your upper arm and when it should be removed. Keep this card with your health records.  NOTE:This sheet is a summary. It may not cover all possible information. If you have questions about this medicine, talk to your  doctor, pharmacist, or health care provider. Copyright  2019 [employer]

## 2019-10-24 ASSESSMENT — ANXIETY QUESTIONNAIRES: GAD7 TOTAL SCORE: 0

## 2019-11-05 ENCOUNTER — OFFICE VISIT (OUTPATIENT)
Dept: OBGYN | Facility: CLINIC | Age: 24
End: 2019-11-05
Payer: COMMERCIAL

## 2019-11-05 VITALS — WEIGHT: 124.9 LBS | SYSTOLIC BLOOD PRESSURE: 108 MMHG | BODY MASS INDEX: 20.16 KG/M2 | DIASTOLIC BLOOD PRESSURE: 76 MMHG

## 2019-11-05 DIAGNOSIS — Z30.017 INSERTION OF IMPLANTABLE SUBDERMAL CONTRACEPTIVE: ICD-10-CM

## 2019-11-05 DIAGNOSIS — Z01.812 PRE-PROCEDURE LAB EXAM: Primary | ICD-10-CM

## 2019-11-05 LAB — HCG UR QL: NEGATIVE

## 2019-11-05 PROCEDURE — 81025 URINE PREGNANCY TEST: CPT | Performed by: ADVANCED PRACTICE MIDWIFE

## 2019-11-05 PROCEDURE — 11981 INSERTION DRUG DLVR IMPLANT: CPT | Performed by: ADVANCED PRACTICE MIDWIFE

## 2019-11-05 NOTE — PATIENT INSTRUCTIONS
What Nexplanon Users May Expect    Nexplanon is well tolerated and has a low early-removal rate.    Insertion site complications, such as prolonged pain or infection, are rare. Removal is occasionally difficult, and rarely requires a surgical procedure in the operating room.    Menstrual changes are common with Nexplanon. Bleeding may become more or less frequent or heavy, or absent. The bleeding pattern after the first three months is predictive of future bleeding, but the pattern may change at any time. Average bleeding is 18 days over 3 months. Over 50% of women experience rare over absent bleeding over the two year period, while 25% experience frequent or prolonged bleeding.    In clinical studies, users gained 3.7 pounds over two years. It is unknown what portion of this weight gain is related to Nexplanon    Women with a history of depressed mood may have worsening on Nexplanon, and may need to have the device removed.    Return to baseline ovulation patterns is seen 7-14 days after removal of Nexplanon.    Rarely, headaches and acne have also led to device removal.    Nexplanon may be less effective in women who weight more than 130% of their ideal body weight.    Nexplanon does not protect against HIV or STDs.    Use a back-up method of birth control for the first 7 days after insertion of Nexplanon.    Please call [facility] at [phone] if you have questions or concerns.    For more complete information:  [email or web address]

## 2019-11-05 NOTE — PROGRESS NOTES
Nexplanon Insertion:    Is a pregnancy test required: Yes.  Was it positive or negative?  Negative  Was a consent obtained?  Yes    Subjective: Olive Lima is a 24 year old  presents for Nexplanon.    Patient has been given the opportunity to ask questions about all forms of birth control, including all options appropriate for Olive Lima. Discussed that no method of birth control, except abstinence is 100% effective against pregnancy or sexually transmitted infection.     Olive Lima understands she may have the Nexplanon removed at any time and it should be removed by a health care provider.    The entire insertion procedure was reviewed with the patient, including care after placement.    No LMP recorded (lmp unknown). (Menstrual status: Postpartum). No allergy to betadine or shellfish.  HCG Qual Urine   Date Value Ref Range Status   2019 Negative NEG^Negative Final     Comment:     This test is for screening purposes.  Results should be interpreted along with   the clinical picture.  Confirmation testing is available if warranted by   ordering FFL388, HCG Quantitative Pregnancy.           /76 (BP Location: Right arm, Patient Position: Chair, Cuff Size: Adult Regular)   Wt 56.7 kg (124 lb 14.4 oz)   LMP  (LMP Unknown)   Breastfeeding? Yes   BMI 20.16 kg/m      PROCEDURE NOTE: -- Nexplanon Insertion    Reason for Insertion: contraception    Patient was placed supine with left arm exposed.  Gustavo was made 8-10 cm above medial epicondyle and a guiding gustavo 4 cm above the first.  Arm was prepped with Betadine. Insertion point was anesthetized with 2 mL 1% lidocaine with epinephrine. After stretching the skin with thumb and index finger around the insertion site, skin punctured with the tip of the needle inserted at 30 degrees and then lowered to horizontal position. The needle was then advanced to its full length. Applicator was then stabilized and slider was unlocked. Slider  was pulled back until it stopped and then removed.    Correct placement of the implant was confirmed by palpation in the patient's arm and visualizing the purple top of the obturator.   Bandage and pressure dressing applied to insertion site.    NDC: 6827-8388-79  Exp 11/2021    EBL: minimal    Complications: none    ASSESSMENT:     ICD-10-CM    1. Pre-procedure lab exam Z01.812 HCG qualitative urine - CSC and Range   2. Insertion of implantable subdermal contraceptive Z30.017 etonogestrel (IMPLANON/NEXPLANON) 68 MG IMPL     etonogestrel (IMPLANON/NEXPLANON) subdermal implant 68 mg     INSERTION NON-BIODEGRADABLE DRUG DELIVERY IMPLANT        PLAN:    Given 's handouts, including when to have Nexplanon removed, list of danger s/sx, side effects and follow up recommended. Encouraged condom use for prevention of STD. Back up contraception advised for 7 days. Advised to call for any fever, for prolonged or severe pain or bleeding, abnormal vaginal dischage. She was advised to use pain medications (ibuprofen) as needed for mild to moderate pain.     Franny Francois CNM

## 2019-11-05 NOTE — NURSING NOTE
"Chief Complaint   Patient presents with     Contraception     Nexplanon insertion       Initial /76 (BP Location: Right arm, Patient Position: Chair, Cuff Size: Adult Regular)   Wt 56.7 kg (124 lb 14.4 oz)   LMP  (LMP Unknown)   Breastfeeding? Yes   BMI 20.16 kg/m   Estimated body mass index is 20.16 kg/m  as calculated from the following:    Height as of 10/23/19: 1.676 m (5' 6\").    Weight as of this encounter: 56.7 kg (124 lb 14.4 oz).  BP completed using cuff size: regular    Questioned patient about current smoking habits.  Pt. has never smoked.          The following HM Due: NONE      Terri Jones CMA on 2019 at 10:45 AM         "

## 2019-12-18 ENCOUNTER — DOCUMENTATION ONLY (OUTPATIENT)
Dept: OBGYN | Facility: CLINIC | Age: 24
End: 2019-12-18

## 2019-12-18 NOTE — PROGRESS NOTES
[facility] will be sharing updates with you on Maternal Child Health Referral requests for home care services.  This is for care coordination purposes and alert you to referral status.  We received the referral for  Olive Lima; MRN [id number] and want to update you:      [facility] has made two attempts to contact patient by phone and text message over the last four days.  We have not had any response from patient.  Final message was left advising patient to follow up with Primary Care Providers for mom and baby.  Ordering MD and Primary Care Providers for mom and baby notified.    Sincerely [facility]  Rogelio Medina  [phone]

## 2020-01-08 ENCOUNTER — MEDICAL CORRESPONDENCE (OUTPATIENT)
Dept: HEALTH INFORMATION MANAGEMENT | Facility: CLINIC | Age: 25
End: 2020-01-08

## 2020-01-28 ENCOUNTER — OFFICE VISIT (OUTPATIENT)
Dept: OBGYN | Facility: CLINIC | Age: 25
End: 2020-01-28
Payer: COMMERCIAL

## 2020-01-28 VITALS
WEIGHT: 126 LBS | SYSTOLIC BLOOD PRESSURE: 102 MMHG | DIASTOLIC BLOOD PRESSURE: 66 MMHG | TEMPERATURE: 97.7 F | BODY MASS INDEX: 20.34 KG/M2

## 2020-01-28 DIAGNOSIS — Z97.5 BREAKTHROUGH BLEEDING ON NEXPLANON: Primary | ICD-10-CM

## 2020-01-28 DIAGNOSIS — N92.1 BREAKTHROUGH BLEEDING ON NEXPLANON: Primary | ICD-10-CM

## 2020-01-28 PROCEDURE — 99213 OFFICE O/P EST LOW 20 MIN: CPT | Performed by: ADVANCED PRACTICE MIDWIFE

## 2020-01-28 NOTE — NURSING NOTE
"Chief Complaint   Patient presents with     Vaginal Bleeding     Nexplanon cwtdbm12/5/19, bleeding since       Initial /66 (BP Location: Left arm, Cuff Size: Adult Regular)   Temp 97.7  F (36.5  C) (Oral)   Wt 57.2 kg (126 lb)   BMI 20.34 kg/m   Estimated body mass index is 20.34 kg/m  as calculated from the following:    Height as of 10/23/19: 1.676 m (5' 6\").    Weight as of this encounter: 57.2 kg (126 lb).  BP completed using cuff size: regular    Questioned patient about current smoking habits.  Pt. has never smoked.          Joslyn Valerio CMA           "

## 2020-01-28 NOTE — PATIENT INSTRUCTIONS
What Nexplanon Users May Expect    For appropiate patients, Nexplanon is well tolerated and has a low early-removal rate.    Insertion site complications, such as prolonged pain or infection, are rare. Removal is occasionally difficult, and rarely requires a surgical procedure in the operating room.    Menstrual changes are common with Nexplanon. Bleeding may become more or less frequent or heavy, or absent. The bleeding pattern after the first three months is predictive of future bleeding, but the pattern may change at any time. Average bleeding is 18 days over 3 months. Over 50% of women experience rare over absent bleeding over the two year period, while 25% experience frequent or prolonged bleeding.    In clinical studies, users gained 3.7 pounds over two years. It is unknown what portion of this weight gain is related to Nexplanon    Women with a history of depressed mood may have worsening on Nexplanon, and may need to have the device removed.    Return to baseline ovulation patterns is seen 7-14 days after removal of Nexplanon.    Rarely, headaches and acne have also let to device removal.    Nexplanon may be less effective in women weight more than 130% of their ideal body weight.    Nexplanon does not protect against HIV or STDs.    Please call [facility] at [phone] if you have questions or concerns.    For more complete information:  [email or web address]

## 2020-01-28 NOTE — PROGRESS NOTES
"CC:  Bleeding with Nexplanon    HPI:  Olive Lima is a 24 year old female  who presents for evaluation of uterine bleeding post-Nexplanon insertion. She is accompanied by her , who is acting as .    Duration of bleeding problem: Since Nexplanon insertion on 19  Frequency of bleeding: Most days; at most goes two days without bleeding  Flow: Spotting to light flow, changes a liner or pad twice a day at the heaviest  Post-coital bleeding: no  Pelvic pain: no    She is most concerned about whether or not this is a normal side effect of the Nexplanon. Reports rare \"burning\" sensation at implant site since it was placed. Has been several weeks since she felt this last. Denies migration of implant; feels it is in the same position as when it was placed. She is otherwise happy with this method of contraception.    She also states she is more prone to getting random sweats throughout the day since being postpartum. Denies fevers or chills.    No longer breastfeeding. Weaning went without issue for breasts or baby.    GYN HISTORY:  No LMP recorded. Patient has had an implant.      OBSTETRIC HISTORY:   OB History    Para Term  AB Living   1 1 1 0 0 1   SAB TAB Ectopic Multiple Live Births   0 0 0 0 1      # Outcome Date GA Lbr [place]/2nd Weight Sex Delivery Anes PTL Lv   1 Term 19 40w1d 04:00 / 01:42 3.22 kg (7 lb 1.6 oz) F Vag-Spont EPI N DYLAN      Name: Christelle      Apgar1: 8  Apgar5: 6       Past Medical History:   Diagnosis Date     Hyperemesis of pregnancy        Past Surgical History:   Procedure Laterality Date     NO HISTORY OF SURGERY         SOCIAL HISTORY:  Lives with partner and daughter.  Does not work outside the home.  Tobacco: No  Alcohol: No  Drugs: No    No family history on file.  There is no family history of uterine, ovarian, breast, colon cancer.     Current Outpatient Medications   Medication Sig Dispense Refill     etonogestrel (IMPLANON/NEXPLANON) 68 MG " IMPL 1 each (68 mg) by Subdermal route once       PRENATA 29-1 MG CHEW TAKE ONE TAB BY MOUTH DAILY  0       Allergies: Patient has no known allergies.    ROS:  Cons: NEGATIVE for fever, chills, change in weight  Resp: NEGATIVE for significant cough or SOB  CV: NEGATIVE for chest pain, palpitations or peripheral edema  GI: NEGATIVE for nausea, abdominal pain, heartburn, or change in bowel habits  : POSITIVE for bleeding as above, NEGATIVE for frequency, dysuria, hematuria, vaginal discharge, or pelvic pain  MS: NEGATIVE for significant arthralgias or myalgia  Neuro: NEGATIVE for weakness, dizziness or paresthesias  Endo: POSITIVE for hot flashes, NEGATIVE for temperature intolerance, skin/hair changes  Psych: NEGATIVE for changes in mood or affect    EXAM:  Blood pressure 102/66, temperature 97.7  F (36.5  C), temperature source Oral, weight 57.2 kg (126 lb), currently breastfeeding.   BMI= Body mass index is 20.34 kg/m .  General: Pleasant female in no acute distress.  Skin: Nexplanon palpated easily proximal to scar at insertion site. No redness or edema noted.  Lungs: Respirations even and unlabored  Musculoskeletal: No gross deformities.  Neurological: Normal strength, sensation, and mental status  Psych: Normal mood, bright affect      ASSESSMENT:  (N92.1,  Z97.5) Breakthrough bleeding on Nexplanon  (primary encounter diagnosis)    PLAN:  Reviewed expected bleeding profile with Nexplanon and common patterns seen within the first year post-insertion. Information also provided with AVS.  Hot flashes likely related to postpartum/postlactation hormonal changes. Expect this to regulate over the course of the year. Recommended to continue to take prenatal or women's multivitamin.    F/U:  If bleeding worsens or persists and not tolerated, return to clinic in three months to discuss medical management of bleeding or Nexplanon removal.      PABLO Blanco, CNM

## 2020-09-16 ENCOUNTER — OFFICE VISIT (OUTPATIENT)
Dept: OBGYN | Facility: CLINIC | Age: 25
End: 2020-09-16
Payer: COMMERCIAL

## 2020-09-16 VITALS — BODY MASS INDEX: 18.4 KG/M2 | SYSTOLIC BLOOD PRESSURE: 118 MMHG | WEIGHT: 114 LBS | DIASTOLIC BLOOD PRESSURE: 76 MMHG

## 2020-09-16 DIAGNOSIS — Z30.432 ENCOUNTER FOR REMOVAL OF INTRAUTERINE CONTRACEPTIVE DEVICE: Primary | ICD-10-CM

## 2020-09-16 PROCEDURE — 11982 REMOVE DRUG IMPLANT DEVICE: CPT | Performed by: ADVANCED PRACTICE MIDWIFE

## 2020-09-16 NOTE — NURSING NOTE
"Chief Complaint   Patient presents with     Procedure     Nexplanon Removal       Initial /76 (BP Location: Right arm, Cuff Size: Adult Regular)   Wt 51.7 kg (114 lb)   Breastfeeding No   BMI 18.40 kg/m   Estimated body mass index is 18.4 kg/m  as calculated from the following:    Height as of 10/23/19: 1.676 m (5' 6\").    Weight as of this encounter: 51.7 kg (114 lb).  BP completed using cuff size: regular    Questioned patient about current smoking habits.  Pt. has never smoked.          The following HM Due: NONE    Would like Nexplanon removed due wanting to get pregnant    Leonel Sow MA    "

## 2020-09-16 NOTE — PROGRESS NOTES
Nexplanon Removal:     Is a pregnancy test required: No.  Was a consent obtained?  Yes    Olive Lima is here for removal of etonogestrel implant Nexplanon/Implanon    Indication: desire for pregnancy      Preoperative Diagnosis: etonogestrel implant  Postoperative Diagnosis: etonogestrel implant removed    Technique: On the left arm  Skin prep Betadine  Anesthesia 1% lidocaine with epi  Procedure: Small incision (<5mm) was made at distal end of palpable implant, curved hemostat or mosquito forceps was used to isolate the implant and bring it to the incision, the fibrous capsule containing the implant  was incised and the Implant was removed intact.      EBL: minimal  Complications:  No  Tolerance:  Pt tolerated procedure well and was in stable condition.   Dressing:    A pressure bandage was placed for the next 12-24 hours.    Contraception was discussed and patient chose the following method none  Discussed preconception health, recommended restarting PNV daily  Reviewed menstrual cycle and signs for ovulation, timing of intercouse      Follow up: Pt was instructed to call if bleeding, severe pain or fever.     Rachelle Vargas CNM

## 2020-11-09 ENCOUNTER — APPOINTMENT (OUTPATIENT)
Dept: INTERPRETER SERVICES | Facility: CLINIC | Age: 25
End: 2020-11-09
Payer: COMMERCIAL

## 2020-11-09 ENCOUNTER — OFFICE VISIT (OUTPATIENT)
Dept: OBGYN | Facility: CLINIC | Age: 25
End: 2020-11-09
Payer: COMMERCIAL

## 2020-11-09 VITALS — SYSTOLIC BLOOD PRESSURE: 104 MMHG | BODY MASS INDEX: 17.27 KG/M2 | DIASTOLIC BLOOD PRESSURE: 62 MMHG | WEIGHT: 107 LBS

## 2020-11-09 DIAGNOSIS — O21.9 NAUSEA AND VOMITING OF PREGNANCY, ANTEPARTUM: ICD-10-CM

## 2020-11-09 DIAGNOSIS — Z32.01 POSITIVE PREGNANCY TEST: Primary | ICD-10-CM

## 2020-11-09 DIAGNOSIS — Z34.81 PRENATAL CARE, SUBSEQUENT PREGNANCY IN FIRST TRIMESTER: ICD-10-CM

## 2020-11-09 LAB — HCG UR QL: POSITIVE

## 2020-11-09 PROCEDURE — 99214 OFFICE O/P EST MOD 30 MIN: CPT | Performed by: OBSTETRICS & GYNECOLOGY

## 2020-11-09 PROCEDURE — 81025 URINE PREGNANCY TEST: CPT | Performed by: OBSTETRICS & GYNECOLOGY

## 2020-11-09 RX ORDER — PRENATAL VIT/IRON FUM/FOLIC AC 27MG-0.8MG
1 TABLET ORAL DAILY
Qty: 90 TABLET | Refills: 3 | Status: SHIPPED | OUTPATIENT
Start: 2020-11-09 | End: 2021-04-22

## 2020-11-09 RX ORDER — ONDANSETRON 4 MG/1
4-8 TABLET, ORALLY DISINTEGRATING ORAL EVERY 6 HOURS PRN
Qty: 30 TABLET | Refills: 1 | Status: SHIPPED | OUTPATIENT
Start: 2020-11-09 | End: 2021-04-22

## 2020-11-09 NOTE — PROGRESS NOTES
Positive pregnancy test    Ms. Olive Lima 25 year old P1 (hx of spontaneous vaginal delivery) presents for positive pregnancy. She states that the first day of her last menstrual period was 9/24. However, she had her Nexplanon removed on 9/16.   She also has been dealing with nausea and vomiting. She states that it is hard for her to tolerate water. She had experience this in her first pregnancy as well and required as needed visits to the infusion clinic for IV fluid hydration and IV anti-emetics. She inquires as to whether this will be done again.   Otherwise, she denies vaginal bleeding, pelvic cramping. Reports normal bladder and bowel habits.     /62   Wt 48.5 kg (107 lb)   LMP 09/24/2020   Breastfeeding No   BMI 17.27 kg/m      Exam:   /62   Wt 48.5 kg (107 lb)   LMP 09/24/2020   Breastfeeding No   BMI 17.27 kg/m    General Appearance: Well nourished, well developed female, NAD, AOx3  Neurological: Mental Status Normal and Station and Gait Normal   Skin: Normal skin turgor  HEET: Atraumatic, normocephalic, EOMI  Neck: Supple,no adenopathy,thyroid normal  Lungs: Good respiratory effort  Abdomen: Soft, NT, ND, no masses  Pelvic: deferred  Extremities: No clubbing, no cyanosis and no edema    A/P: 1) Positive pregnancy test 2) Nausea and vomiting in pregnancy   -- dating ultrasound ordered to have done next week to determine viability and confirmation of due date  -- Zofran ODT prescribed; also infusion therapy nausea and vomiting protocol ordered  -- prenatal vitamins prescribed  -- return for new prenatal OB care following dating ultrasound  -- SAB and ectopic precautions reviewed    Total time spent was 25 minutes; greater than 50% of time was spent in counseling and/or coordination of care for the above listed diagnoses, not including time spent on the procedure.    Julian Rodriguez MD  [facility]

## 2020-11-14 ENCOUNTER — HOSPITAL ENCOUNTER (EMERGENCY)
Facility: CLINIC | Age: 25
Discharge: HOME | End: 2020-11-14
Attending: EMERGENCY MEDICINE | Admitting: EMERGENCY MEDICINE
Payer: COMMERCIAL

## 2020-11-14 VITALS
BODY MASS INDEX: 17.92 KG/M2 | SYSTOLIC BLOOD PRESSURE: 129 MMHG | WEIGHT: 111 LBS | DIASTOLIC BLOOD PRESSURE: 62 MMHG | HEART RATE: 77 BPM | TEMPERATURE: 98.3 F | OXYGEN SATURATION: 100 % | RESPIRATION RATE: 18 BRPM

## 2020-11-14 DIAGNOSIS — O21.9 NAUSEA AND VOMITING IN PREGNANCY: ICD-10-CM

## 2020-11-14 LAB
ANION GAP SERPL CALCULATED.3IONS-SCNC: 7 MMOL/L (ref 3–14)
BUN SERPL-MCNC: 6 MG/DL (ref 7–30)
CALCIUM SERPL-MCNC: 8.7 MG/DL (ref 8.5–10.1)
CHLORIDE SERPL-SCNC: 107 MMOL/L (ref 94–109)
CO2 SERPL-SCNC: 23 MMOL/L (ref 20–32)
CREAT SERPL-MCNC: 0.43 MG/DL (ref 0.52–1.04)
ERYTHROCYTE [DISTWIDTH] IN BLOOD BY AUTOMATED COUNT: 12.2 % (ref 10–15)
GFR SERPL CREATININE-BSD FRML MDRD: >90 ML/MIN/{1.73_M2}
GLUCOSE SERPL-MCNC: 82 MG/DL (ref 70–99)
HCT VFR BLD AUTO: 41.3 % (ref 35–47)
HGB BLD-MCNC: 13.8 G/DL (ref 11.7–15.7)
MCH RBC QN AUTO: 27.8 PG (ref 26.5–33)
MCHC RBC AUTO-ENTMCNC: 33.4 G/DL (ref 31.5–36.5)
MCV RBC AUTO: 83 FL (ref 78–100)
PLATELET # BLD AUTO: 198 10E9/L (ref 150–450)
POTASSIUM SERPL-SCNC: 3.5 MMOL/L (ref 3.4–5.3)
RBC # BLD AUTO: 4.96 10E12/L (ref 3.8–5.2)
SODIUM SERPL-SCNC: 137 MMOL/L (ref 133–144)
WBC # BLD AUTO: 4.7 10E9/L (ref 4–11)

## 2020-11-14 PROCEDURE — 96374 THER/PROPH/DIAG INJ IV PUSH: CPT

## 2020-11-14 PROCEDURE — 99284 EMERGENCY DEPT VISIT MOD MDM: CPT | Mod: 25

## 2020-11-14 PROCEDURE — 85027 COMPLETE CBC AUTOMATED: CPT | Performed by: EMERGENCY MEDICINE

## 2020-11-14 PROCEDURE — 96375 TX/PRO/DX INJ NEW DRUG ADDON: CPT

## 2020-11-14 PROCEDURE — 96361 HYDRATE IV INFUSION ADD-ON: CPT

## 2020-11-14 PROCEDURE — 258N000003 HC RX IP 258 OP 636: Performed by: EMERGENCY MEDICINE

## 2020-11-14 PROCEDURE — 250N000011 HC RX IP 250 OP 636: Performed by: EMERGENCY MEDICINE

## 2020-11-14 PROCEDURE — 80048 BASIC METABOLIC PNL TOTAL CA: CPT | Performed by: EMERGENCY MEDICINE

## 2020-11-14 RX ORDER — SODIUM CHLORIDE 9 MG/ML
INJECTION, SOLUTION INTRAVENOUS CONTINUOUS
Status: DISCONTINUED | OUTPATIENT
Start: 2020-11-14 | End: 2020-11-14 | Stop reason: HOSPADM

## 2020-11-14 RX ORDER — METOCLOPRAMIDE 5 MG/1
5-10 TABLET ORAL 4 TIMES DAILY PRN
Qty: 30 TABLET | Refills: 0 | Status: SHIPPED | OUTPATIENT
Start: 2020-11-14 | End: 2021-04-22

## 2020-11-14 RX ORDER — DIPHENHYDRAMINE HYDROCHLORIDE 50 MG/ML
25 INJECTION INTRAMUSCULAR; INTRAVENOUS ONCE
Status: COMPLETED | OUTPATIENT
Start: 2020-11-14 | End: 2020-11-14

## 2020-11-14 RX ORDER — METOCLOPRAMIDE HYDROCHLORIDE 5 MG/ML
5 INJECTION INTRAMUSCULAR; INTRAVENOUS ONCE
Status: COMPLETED | OUTPATIENT
Start: 2020-11-14 | End: 2020-11-14

## 2020-11-14 RX ADMIN — METOCLOPRAMIDE HYDROCHLORIDE 5 MG: 5 INJECTION INTRAMUSCULAR; INTRAVENOUS at 08:54

## 2020-11-14 RX ADMIN — SODIUM CHLORIDE 1000 ML: 9 INJECTION, SOLUTION INTRAVENOUS at 08:53

## 2020-11-14 RX ADMIN — DIPHENHYDRAMINE HYDROCHLORIDE 25 MG: 50 INJECTION, SOLUTION INTRAMUSCULAR; INTRAVENOUS at 08:54

## 2020-11-14 ASSESSMENT — ENCOUNTER SYMPTOMS
VOMITING: 1
FATIGUE: 1
ABDOMINAL PAIN: 0
NAUSEA: 1

## 2020-11-14 NOTE — ED AVS SNAPSHOT
[facility] Emergency Dept  [address]  [facility] [address]  Phone: [phone]  Fax: [phone]                                    Olive Lima   MRN: [id number]    Department: [facility] Emergency Dept   Date of Visit: 11/14/2020           After Visit Summary Signature Page    I have received my discharge instructions, and my questions have been answered. I have discussed any challenges I see with this plan with the nurse or doctor.    ..........................................................................................................................................  Patient/Patient Representative Signature      ..........................................................................................................................................  Patient Representative Print Name and Relationship to Patient    ..................................................               ................................................  Date                                   Time    ..........................................................................................................................................  Reviewed by Signature/Title    ...................................................              ..............................................  Date                                               Time          22EPIC Rev 08/18

## 2020-11-14 NOTE — ED NOTES
Pt is taking small sips of water along with crackers.  She spits saliva into emesis bag.  No active vomiting.

## 2020-11-14 NOTE — ED TRIAGE NOTES
ABCs intact. Pt LMP 9/24. Pt is about 7 weeks pregnant. Pt c/o n/v x 2 weeks. Denies abdominal pain or cramping. Denies vaginal bleeding or discharge.

## 2020-11-14 NOTE — ED PROVIDER NOTES
History     Chief Complaint:  Nausea and Vomiting    The history is provided by the patient. A  was used ([employer]  ).      Olive Lima is a 25 year old 7 week pregnant female who presents with nausea and vomiting. For the past 2 weeks, the patient has had nausea and vomiting, similar to past pregnancy but ultimately required infusion visits.  She notes occasionally she has noted some blood streaks in the vomit but denies large amounts.  She notes she vomits 4-5 times a day.  She denies stool changes.  She denies abdominal pain, cramping, vaginal bleeding, and vaginal discharge.  She denies urinary changes.  She notes she feels weak has no energy.  Her last menstrual period was on 9/24.     Allergies:  No known drug allergies    Medications:    Zofran  Etonogestrel  Prenatal Multivitamin    Past Medical History:    Hyperemesis and nausea of Pregnancy  Postpartum State     Past Surgical History:    The patient does not have any pertinent past surgical history.    Family History:    No past pertinent family history.    Social History:  The patient was unaccompanied to the ED.  Smoking Status: Never Smoker  Smokeless Tobacco: Never Used  Alcohol Use: Negative  Marital Status:  Single     Review of Systems   Constitutional: Positive for fatigue.   Gastrointestinal: Positive for nausea and vomiting. Negative for abdominal pain.   Genitourinary: Negative for decreased urine volume, vaginal bleeding and vaginal discharge.   All other systems reviewed and are negative.    Physical Exam     Patient Vitals for the past 24 hrs:   BP Temp Temp src Pulse Resp SpO2 Weight   11/14/20 0754 114/81 98.3  F (36.8  C) Oral 78 18 99 % 50.3 kg (111 lb)       Physical Exam  General: Thin adult female sitting upright  Eyes: PERRL, Conjunctive within normal limits. No scleral icterus.  ENT: Moist mucous membranes, oropharynx clear.   CV: Normal S1S2, no murmur, rub or gallop. Regular rate and  rhythm  Resp: Clear to auscultation bilaterally. Normal respiratory effort.  GI: Abdomen is soft, nontender and nondistended. No palpable masses. No rebound or guarding.  MSK: No edema. Nontender. Normal active range of motion.  Skin: Warm and dry. No rashes or lesions or ecchymoses on visible skin.  Neuro: Alert and oriented. Responds appropriately to all questions and commands. No focal findings appreciated. Normal muscle tone.  Psych: Normal mood and affect. Pleasant.    Emergency Department Course     Laboratory:  Laboratory findings were communicated with the patient who voiced understanding of the findings.    CBC: WBC 4.7, HGB 13.8,   BMP: Glucose 82, Creatinine 0.43 (L), BUN 6 (L), o/w WNL     Interventions:  0853 0.9% NaCl bolus 1000 mL IV  0854 Benadryl 25 mg IV  0854 Reglan 5 mg IV     Emergency Department Course:  Past medical records, nursing notes, and vitals reviewed.    0829 I performed an exam of the patient as documented above.     IV was inserted and blood was drawn for laboratory testing, results above.    1006 I rechecked the patient and discussed the results of her workup thus far.     Findings and plan explained to the Patient. Patient discharged home with instructions regarding supportive care, medications, and reasons to return. The importance of close follow-up was reviewed. The patient was prescribed Reglan.     I personally reviewed the laboratory results with the Patient and answered all related questions prior to discharge.     Impression & Plan     Medical Decision Making:    Olive Lima is a 25 year old female currently approximately 7wks pregnant by dates who presents for evaluation of nausea and vomiting.  Although vomiting due to pregnancy likely the cause of her symptoms given similar symptoms of past pregnancy. I considered a broad differential diagnosis for this patient including viral gastroenteritis, food poisoning, bowel obstruction, intra-abdominal infection such  as colitis, cholecystitis, UTI, pyelonephritis, appendicitis, etc.  There are no signs of worrisome intra-abdominal pathologies detected during the visit today.  The patient has a completely benign abdominal exam without complaints of abdominal pain, rebound, guarding, or tenderness to palpation.  I doubt ectopic pregnancy based on lack of abdominal pelvic pain or vaginal symptoms.  She had improvement in nausea with interventions here.  She was hydrated.  She was able to tolerate crackers and fluid prior to discharge..      Supportive outpatient management is therefore indicated.  Vomiting precautions are given for home.    It was discussed with the patient to return to the ED for blood in stool, increasing pain, or fevers more than 102.   Feels much improved after interventions in ED.  See above for medications for home.  I believe all of her symptoms are at this point explained by the pregnancy and hyperemesis gravidarum.  She should talk to her OB/GYN and discussed the need for infusion schedule if her symptoms should persist to the extent they did with her last pregnancy in which she needed infusions.  Sent home with Reglan as needed.    Diagnosis:    ICD-10-CM    1. Nausea and vomiting in pregnancy  O21.9        Disposition:  Discharged to home.    Discharge Medications:  New Prescriptions    METOCLOPRAMIDE (REGLAN) 5 MG TABLET    Take 1-2 tablets (5-10 mg) by mouth 4 times daily as needed (nausea, vomiting)       Scribe Disclosure:  I, Giovanni Aquino, am serving as a scribe at 8:31 AM on 11/14/2020 to document services personally performed by Andie Rendon MD based on my observations and the provider's statements to me.        Andie Rendon MD  11/14/20 [id number]

## 2020-11-17 ENCOUNTER — ANCILLARY PROCEDURE (OUTPATIENT)
Dept: ULTRASOUND IMAGING | Facility: CLINIC | Age: 25
End: 2020-11-17
Attending: OBSTETRICS & GYNECOLOGY
Payer: COMMERCIAL

## 2020-11-17 DIAGNOSIS — Z34.81 PRENATAL CARE, SUBSEQUENT PREGNANCY IN FIRST TRIMESTER: ICD-10-CM

## 2020-11-17 PROCEDURE — 76801 OB US < 14 WKS SINGLE FETUS: CPT | Performed by: FAMILY MEDICINE

## 2020-11-27 ENCOUNTER — HOSPITAL ENCOUNTER (EMERGENCY)
Facility: CLINIC | Age: 25
Discharge: HOME | End: 2020-11-27
Attending: INTERNAL MEDICINE | Admitting: INTERNAL MEDICINE
Payer: COMMERCIAL

## 2020-11-27 VITALS
TEMPERATURE: 98.2 F | DIASTOLIC BLOOD PRESSURE: 78 MMHG | HEART RATE: 87 BPM | RESPIRATION RATE: 16 BRPM | SYSTOLIC BLOOD PRESSURE: 112 MMHG | OXYGEN SATURATION: 100 %

## 2020-11-27 DIAGNOSIS — O21.0 HYPEREMESIS GRAVIDARUM: ICD-10-CM

## 2020-11-27 PROCEDURE — 96361 HYDRATE IV INFUSION ADD-ON: CPT

## 2020-11-27 PROCEDURE — 96374 THER/PROPH/DIAG INJ IV PUSH: CPT

## 2020-11-27 PROCEDURE — 250N000011 HC RX IP 250 OP 636: Performed by: INTERNAL MEDICINE

## 2020-11-27 PROCEDURE — 96375 TX/PRO/DX INJ NEW DRUG ADDON: CPT

## 2020-11-27 PROCEDURE — 96376 TX/PRO/DX INJ SAME DRUG ADON: CPT

## 2020-11-27 PROCEDURE — 99284 EMERGENCY DEPT VISIT MOD MDM: CPT | Mod: 25

## 2020-11-27 PROCEDURE — 258N000003 HC RX IP 258 OP 636: Performed by: INTERNAL MEDICINE

## 2020-11-27 RX ORDER — METOCLOPRAMIDE HYDROCHLORIDE 5 MG/ML
5 INJECTION INTRAMUSCULAR; INTRAVENOUS ONCE
Status: COMPLETED | OUTPATIENT
Start: 2020-11-27 | End: 2020-11-27

## 2020-11-27 RX ORDER — SODIUM CHLORIDE 9 MG/ML
1000 INJECTION, SOLUTION INTRAVENOUS CONTINUOUS
Status: DISCONTINUED | OUTPATIENT
Start: 2020-11-27 | End: 2020-11-28 | Stop reason: HOSPADM

## 2020-11-27 RX ORDER — DIPHENHYDRAMINE HYDROCHLORIDE 50 MG/ML
25 INJECTION INTRAMUSCULAR; INTRAVENOUS ONCE
Status: COMPLETED | OUTPATIENT
Start: 2020-11-27 | End: 2020-11-27

## 2020-11-27 RX ADMIN — METOCLOPRAMIDE HYDROCHLORIDE 5 MG: 5 INJECTION INTRAMUSCULAR; INTRAVENOUS at 20:57

## 2020-11-27 RX ADMIN — DIPHENHYDRAMINE HYDROCHLORIDE 25 MG: 50 INJECTION, SOLUTION INTRAMUSCULAR; INTRAVENOUS at 20:57

## 2020-11-27 RX ADMIN — METOCLOPRAMIDE HYDROCHLORIDE 5 MG: 5 INJECTION INTRAMUSCULAR; INTRAVENOUS at 22:29

## 2020-11-27 RX ADMIN — SODIUM CHLORIDE 1000 ML: 9 INJECTION, SOLUTION INTRAVENOUS at 20:56

## 2020-11-27 RX ADMIN — SODIUM CHLORIDE 1000 ML: 9 INJECTION, SOLUTION INTRAVENOUS at 23:01

## 2020-11-27 ASSESSMENT — ENCOUNTER SYMPTOMS
HEMATURIA: 0
NAUSEA: 1
WEAKNESS: 1
VOMITING: 1
ABDOMINAL PAIN: 0
DIZZINESS: 1
FREQUENCY: 0
DYSURIA: 0

## 2020-11-27 NOTE — ED AVS SNAPSHOT
[facility] Emergency Dept  [address]  [facility] [address]  Phone: [phone]  Fax: [phone]                                    Olive Lima   MRN: [id number]    Department: [facility] Emergency Dept   Date of Visit: 11/27/2020           After Visit Summary Signature Page    I have received my discharge instructions, and my questions have been answered. I have discussed any challenges I see with this plan with the nurse or doctor.    ..........................................................................................................................................  Patient/Patient Representative Signature      ..........................................................................................................................................  Patient Representative Print Name and Relationship to Patient    ..................................................               ................................................  Date                                   Time    ..........................................................................................................................................  Reviewed by Signature/Title    ...................................................              ..............................................  Date                                               Time          22EPIC Rev 08/18       
All other review of systems negative, except as noted in HPI

## 2020-11-28 NOTE — DISCHARGE INSTRUCTIONS
Discharge Instructions  Hyperemesis Gravidarum    You were seen today for hyperemesis gravidarum. It is very common to have some nausea and vomiting in early pregnancy (sometimes called  morning sickness,  although it happens at all times of day.) In hyperemesis gravidarum, however, the vomiting is much more severe, so you may become dehydrated and lose weight. We have treated you today and improved your symptoms, but they usually come back. You need to follow-up with your regular doctor or obstetrician within 1-2 days.    Return to the Emergency Department if:  You feel dizzy or light headed when standing up.  You are vomiting and can t keep fluids or medications down.  You urinate less often than usual and have dark yellow urine.   You feel much more ill or develop new symptoms.    What can I do to help myself?  Be sure to drink plenty of cold clear fluids (sports drinks and ginger ale) and suck on popsicles between meals.  Eat as soon as you feel hungry, or even before you feel hungry. Try to keep something on your stomach.   Avoid strong smells, or other things that make you feel nauseated.  Snack often and eat small meals high in protein or carbohydrates such as crackers, bread, nuts, and low-fat yogurt. Avoid spicy, greasy, or acid foods.  Avoid lying down right after you eat.  Take your prenatal vitamins at bedtime with a snack instead of taking them in the morning.  Ginger may make you feel better. Try eating a small piece of ginger, or having ginger-flavored hard candies.  Acupressure wrist bands are also helpful to some people.    Treatment:  Nausea medication.  Your doctor may send you home with a prescription medicine, like Zofran  (ondansetron), Compazine  (prochlorperazine), or Reglan  (metoclopramide).     Your doctor may recommend that you take non-prescription nausea medicines, like Dramamine  (dimenhydrinate).    Vitamins. Make sure your prenatal vitamins have 400 micrograms of Folic acid and have  vitamin B6, since this may help your nausea and vomiting.   If you were given a prescription for medicine here today, be sure to read all of the information (including the package insert) that comes with your prescription.  This will include important information about the medicine, its side effects, and any warnings that you need to know about.  The pharmacist who fills the prescription can provide more information and answer questions you may have about the medicine.  If you have questions or concerns that the pharmacist cannot address, please call or return to the Emergency Department.         Opioid Medication Information    Pain medications are among the most commonly prescribed medicines, so we are including this information for all our patients. If you did not receive pain medication or get a prescription for pain medicine, you can ignore it.     You may have been given a prescription for an opioid (narcotic) pain medicine and/or have received a pain medicine while here in the Emergency Department. These medicines can make you drowsy or impaired. You must not drive, operate dangerous equipment, or engage in any other dangerous activities while taking these medications. If you drive while taking these medications, you could be arrested for DUI, or driving under the influence. Do not drink any alcohol while you are taking these medications.     Opioid pain medications can cause addiction. If you have a history of chemical dependency of any type, you are at a higher risk of becoming addicted to pain medications.  Only take these prescribed medications to treat your pain when all other options have been tried. Take it for as short a time and as few doses as possible. Store your pain pills in a secure place, as they are frequently stolen and provide a dangerous opportunity for children or visitors in your house to start abusing these powerful medications. We will not replace any lost or stolen medicine.  As soon  as your pain is better, you should flush all your remaining medication.     Many prescription pain medications contain Tylenol  (acetaminophen), including Vicodin , Tylenol #3 , Norco , Lortab , and Percocet .  You should not take any extra pills of Tylenol  if you are using these prescription medications or you can get very sick.  Do not ever take more than 3000 mg of acetaminophen in any 24 hour period.    All opioids tend to cause constipation. Drink plenty of water and eat foods that have a lot of fiber, such as fruits, vegetables, prune juice, apple juice and high fiber cereal.  Take a laxative if you don t move your bowels at least every other day. Miralax , Milk of Magnesia, Colace , or Senna  can be used to keep you regular.      Remember that you can always come back to the Emergency Department if you are not able to see your regular doctor in the amount of time listed above, if you get any new symptoms, or if there is anything that worries you.

## 2020-11-28 NOTE — ED PROVIDER NOTES
History   Chief Complaint  Nausea & Vomiting    The history is provided by the spouse (acting as a ).      Olive Lima is a 25 year old female that is 9 weeks pregnant, with a history of hyperemesis, who presents for evaluation of nausea and vomiting that has been ongoing for the past month. She was seen for this issue two weeks ago and felt better after IV medications. She was discharged with PO Reglan however she notes that this has not helped with her symptoms. Here, the patient also reports that she feels dizzy and weak. She notes experiencing about 5 episodes of emesis a day with some blood streaks in the vomit. Denies any abdominal cramping, vaginal discharge, vaginal bleeding, or urinary symptoms. Of note, the patient had this issue during her previous pregnancy for which she required several infusions visits.     Allergies  No Known Allergies    Medications  Nexplanon  Reglan  Zofran    Past Medical History  Hyperemesis of pregnancy    Past Surgical History  The patient does not have any pertinent past surgical history.    Family History  No past pertinent family history.    Social History  Tobacco use: negative  Alcohol use: negative  Drug use: negative  Marital Status: single     Review of Systems   Gastrointestinal: Positive for nausea and vomiting. Negative for abdominal pain.   Genitourinary: Negative for dysuria, frequency, hematuria, vaginal bleeding and vaginal discharge.   Neurological: Positive for dizziness and weakness.   All other systems reviewed and are negative.    Physical Exam     Patient Vitals for the past 24 hrs:   BP Temp Temp src Pulse Resp SpO2   11/27/20 2330 -- -- -- -- -- 100 %   11/27/20 2315 -- -- -- -- -- 100 %   11/27/20 2300 -- -- -- -- -- 99 %   11/27/20 2245 -- -- -- -- -- 99 %   11/27/20 2230 -- -- -- -- -- 100 %   11/27/20 2215 -- -- -- -- -- 100 %   11/27/20 2200 -- -- -- -- -- 100 %   11/27/20 2145 -- -- -- -- -- 100 %   11/27/20 2130 -- -- -- -- --  100 %   11/27/20 2115 -- -- -- -- -- 100 %   11/27/20 2100 -- -- -- -- -- 100 %   11/27/20 2025 119/87 98.2  F (36.8  C) Temporal 87 18 98 %     Physical Exam  Constitutional:       Comments: Pleasant and cooperative   HENT:      Mouth/Throat:      Pharynx: No posterior oropharyngeal erythema.   Eyes:      Conjunctiva/sclera: Conjunctivae normal.   Neck:      Musculoskeletal: Neck supple.   Cardiovascular:      Rate and Rhythm: Normal rate and regular rhythm.      Heart sounds: Normal heart sounds.   Pulmonary:      Effort: Pulmonary effort is normal.      Breath sounds: Normal breath sounds.   Abdominal:      General: Bowel sounds are normal. There is no distension.      Palpations: Abdomen is soft.      Tenderness: There is no abdominal tenderness. There is no guarding or rebound.   Musculoskeletal: Normal range of motion.   Skin:     General: Skin is warm and dry.   Neurological:      Mental Status: She is alert.         Emergency Department Course   Interventions:  2056 NS 1L IV  2057 Reglan 5 mg IV  2057 Benadryl 25 mg IV  2229 Reglan 5 mg IV  2301 NS 1L IV    Emergency Department Course:  Past medical records, nursing notes, and vitals reviewed.     The patient was started by Bronwyn hackett PA student.     1052 I physically examined the patient as documented above.    1133 The patient was rechecked.      Findings and plan explained to the Patient. Patient discharged home with instructions regarding supportive care, medications, and reasons to return. The importance of close follow-up was reviewed.     I personally answered all related questions prior to discharge.     Impression & Plan   Medical Decision Making:    Olive Lima is a 25 year old female who presents with nausea and vomiting in early pregnancy.  She was seen for the same thing a couple of weeks ago and improved after fluid.  Here she is given IV fluids and antiemetics.  She is now feeling improved and has passed an oral challenge.  We will  discharge her home to continue Reglan and behavioral measures.  Return if unable to keep in fluids, follow-up with OB this week.    Diagnosis:    ICD-10-CM    1. Hyperemesis gravidarum  O21.0      Disposition:  Discharged to home.    Discharge Medications:  New Prescriptions    No medications on file     Scribe Disclosure:  I, Stu Nolasco, am serving as a scribe at 8:29 PM on 11/27/2020 to document services personally performed by Rosa Chan MD based on my observations and the provider's statements to me.      Rosa Chan MD  11/27/20 1785

## 2020-12-04 ENCOUNTER — APPOINTMENT (OUTPATIENT)
Dept: INTERPRETER SERVICES | Facility: CLINIC | Age: 25
End: 2020-12-04
Payer: COMMERCIAL

## 2020-12-04 ENCOUNTER — TELEPHONE (OUTPATIENT)
Dept: OBGYN | Facility: CLINIC | Age: 25
End: 2020-12-04

## 2020-12-04 DIAGNOSIS — Z11.59 SCREENING FOR VIRAL DISEASE: Primary | ICD-10-CM

## 2020-12-04 RX ORDER — ONDANSETRON 2 MG/ML
4 INJECTION INTRAMUSCULAR; INTRAVENOUS ONCE
Status: CANCELLED | OUTPATIENT
Start: 2020-12-04 | End: 2020-12-04

## 2020-12-04 RX ORDER — DEXTROSE, SODIUM CHLORIDE, SODIUM LACTATE, POTASSIUM CHLORIDE, AND CALCIUM CHLORIDE 5; .6; .31; .03; .02 G/100ML; G/100ML; G/100ML; G/100ML; G/100ML
1000 INJECTION, SOLUTION INTRAVENOUS ONCE
Status: CANCELLED | OUTPATIENT
Start: 2020-12-04 | End: 2020-12-04

## 2020-12-04 NOTE — TELEPHONE ENCOUNTER
Pt calls after being in ED yesterday for hyperemesis.  She has taken antinausea meds without relief.    Do you want to order IV hydration treatments?  Or see her in clinic?    Unable to keep food or fluids down, feeling dizziness.    Dianna MEDLEY R.N.

## 2020-12-04 NOTE — TELEPHONE ENCOUNTER
The order was in there unsigned.  I think I signed it now, but check in your inbasket.    Dianna MEDLEY R.N.

## 2020-12-04 NOTE — TELEPHONE ENCOUNTER
Yes, I would like her to set up IV infusion therapy PRN visit.   I though that we had coordinated this for her following her visit with me on 11/9. She had required IV infusion therapy for her previous pregnancies. Can this be set up again order-wise so that I can sign off on it?  Thank you.     Julian Rodriguez MD

## 2020-12-05 DIAGNOSIS — Z11.59 SCREENING FOR VIRAL DISEASE: ICD-10-CM

## 2020-12-05 LAB
LABORATORY COMMENT REPORT: NORMAL
SARS-COV-2 RNA SPEC QL NAA+PROBE: NEGATIVE
SARS-COV-2 RNA SPEC QL NAA+PROBE: NORMAL
SPECIMEN SOURCE: NORMAL
SPECIMEN SOURCE: NORMAL

## 2020-12-05 PROCEDURE — U0003 INFECTIOUS AGENT DETECTION BY NUCLEIC ACID (DNA OR RNA); SEVERE ACUTE RESPIRATORY SYNDROME CORONAVIRUS 2 (SARS-COV-2) (CORONAVIRUS DISEASE [COVID-19]), AMPLIFIED PROBE TECHNIQUE, MAKING USE OF HIGH THROUGHPUT TECHNOLOGIES AS DESCRIBED BY CMS-2020-01-R: HCPCS | Performed by: OBSTETRICS & GYNECOLOGY

## 2020-12-10 ENCOUNTER — NURSE TRIAGE (OUTPATIENT)
Dept: NURSING | Facility: CLINIC | Age: 25
End: 2020-12-10

## 2020-12-10 NOTE — TELEPHONE ENCOUNTER

## 2020-12-12 ENCOUNTER — NURSE TRIAGE (OUTPATIENT)
Dept: NURSING | Facility: CLINIC | Age: 25
End: 2020-12-12

## 2020-12-12 ENCOUNTER — INFUSION THERAPY VISIT (OUTPATIENT)
Dept: INFUSION THERAPY | Facility: CLINIC | Age: 25
End: 2020-12-12
Attending: OBSTETRICS & GYNECOLOGY
Payer: COMMERCIAL

## 2020-12-12 VITALS
OXYGEN SATURATION: 100 % | RESPIRATION RATE: 18 BRPM | SYSTOLIC BLOOD PRESSURE: 98 MMHG | DIASTOLIC BLOOD PRESSURE: 64 MMHG | HEART RATE: 60 BPM

## 2020-12-12 DIAGNOSIS — O21.9 NAUSEA AND VOMITING OF PREGNANCY, ANTEPARTUM: Primary | ICD-10-CM

## 2020-12-12 PROCEDURE — 250N000011 HC RX IP 250 OP 636: Performed by: OBSTETRICS & GYNECOLOGY

## 2020-12-12 PROCEDURE — 96374 THER/PROPH/DIAG INJ IV PUSH: CPT

## 2020-12-12 PROCEDURE — 258N000003 HC RX IP 258 OP 636: Performed by: OBSTETRICS & GYNECOLOGY

## 2020-12-12 RX ORDER — DEXTROSE, SODIUM CHLORIDE, SODIUM LACTATE, POTASSIUM CHLORIDE, AND CALCIUM CHLORIDE 5; .6; .31; .03; .02 G/100ML; G/100ML; G/100ML; G/100ML; G/100ML
1000 INJECTION, SOLUTION INTRAVENOUS ONCE
Status: COMPLETED | OUTPATIENT
Start: 2020-12-12 | End: 2020-12-12

## 2020-12-12 RX ORDER — ONDANSETRON 2 MG/ML
4 INJECTION INTRAMUSCULAR; INTRAVENOUS ONCE
Status: CANCELLED | OUTPATIENT
Start: 2020-12-12 | End: 2020-12-12

## 2020-12-12 RX ORDER — DEXTROSE, SODIUM CHLORIDE, SODIUM LACTATE, POTASSIUM CHLORIDE, AND CALCIUM CHLORIDE 5; .6; .31; .03; .02 G/100ML; G/100ML; G/100ML; G/100ML; G/100ML
1000 INJECTION, SOLUTION INTRAVENOUS ONCE
Status: CANCELLED | OUTPATIENT
Start: 2020-12-12 | End: 2020-12-12

## 2020-12-12 RX ORDER — ONDANSETRON 2 MG/ML
4 INJECTION INTRAMUSCULAR; INTRAVENOUS ONCE
Status: COMPLETED | OUTPATIENT
Start: 2020-12-12 | End: 2020-12-12

## 2020-12-12 RX ADMIN — SODIUM CHLORIDE, SODIUM LACTATE, POTASSIUM CHLORIDE, CALCIUM CHLORIDE AND DEXTROSE MONOHYDRATE 1000 ML: 5; 600; 310; 30; 20 INJECTION, SOLUTION INTRAVENOUS at 11:16

## 2020-12-12 RX ADMIN — SODIUM CHLORIDE, POTASSIUM CHLORIDE, SODIUM LACTATE AND CALCIUM CHLORIDE 1000 ML: 600; 310; 30; 20 INJECTION, SOLUTION INTRAVENOUS at 10:15

## 2020-12-12 RX ADMIN — ONDANSETRON 4 MG: 2 INJECTION INTRAMUSCULAR; INTRAVENOUS at 10:16

## 2020-12-12 NOTE — TELEPHONE ENCOUNTER
"[facility]  used to assist with triage assessment.     Pt reports \"very little\" vaginal bleeding that started today.  She denies pain, fever.  She is nauseated and has a headache.      Disposition: See/Call OB clinic on Monday.  Call FNA back this weekend with any new/worsening symptoms.  She verbalized understanding and had no further questions.     COVID 19 Nurse Triage Plan/Patient Instructions    Please be aware that novel coronavirus (COVID-19) may be circulating in the community. If you develop symptoms such as fever, cough, or SOB or if you have concerns about the presence of another infection including coronavirus (COVID-19), please contact your health care provider or visit [email or web address].     Disposition/Instructions    Visit with provider recommended within 3 days. Reference Visit Selection Guide.    Thank you for taking steps to prevent the spread of this virus.  o Limit your contact with others.  o Wear a simple mask to cover your cough.  o Wash your hands well and often.    Resources    [facility]: About COVID-19: [email or web address]    CDC: What to Do If You're Sick: www.cdc.gov/coronavirus/2019-ncov/about/steps-when-sick.html    CDC: Ending Home Isolation: www.cdc.gov/coronavirus/2019-ncov/hcp/disposition-in-home-patients.html     CDC: Caring for Someone: www.cdc.gov/coronavirus/2019-ncov/if-you-are-sick/care-for-someone.html     [facility]: Interim Guidance for Hospital Discharge to Home: www.health.[facility].mn.us/diseases/coronavirus/hcp/hospdischarge.pdf    [facility] clinical trials (COVID-19 research studies): [email or web address]     Below are the COVID-19 hotlines at the [facility] ([facility]). Interpreters are available.   o For health questions: Call 406-197-7426 or 1-261.328.9762 (7 a.m. to 7 p.m.)  o For questions about schools and childcare: Call 356-751-6057 or 1-224.569.4011 (7 a.m. to 7 p.m.)         Mandy" "MIKE Yanez/FNA              Additional Information    Negative: Shock suspected (e.g., cold/pale/clammy skin, too weak to stand, low BP, rapid pulse)    Negative: Difficult to awaken or acting confused (e.g., disoriented, slurred speech)    Negative: Passed out (i.e., lost consciousness, collapsed and was not responding)    Negative: Sounds like a life-threatening emergency to the triager    Negative: [1] SEVERE vaginal bleeding (i.e., soaking 2 pads / hour, large blood clots) AND [2] present 2 or more hours    Negative: SEVERE abdominal pain    Negative: [1] MODERATE vaginal bleeding (i.e., soaking 1 pad / hour; clots) AND [2] present > 6 hours    Negative: [1] MODERATE vaginal bleeding (i.e., soaking 1 pad / hour; clots) AND [2] pregnant > 12 weeks    Negative: SEVERE dizziness (e.g., unable to stand, requires support to walk, feels like passing out)    Negative: Passed tissue (e.g., gray-white)    Negative: Shoulder pain    Negative: Pale skin (pallor) of new onset or worsening    Negative: Patient sounds very sick or weak to the triager    Negative: [1] Constant abdominal pain AND [2] present > 2 hours    Negative: Fever > 100.4 F (38.0 C)    Negative: [1] Intermittent lower abdominal pain (e.g., cramping) AND [2] present > 24 hours    Negative: Prior history of \"ectopic pregnancy\" or previous tubal surgery (e.g., tubal ligation)    Negative: Pain or burning with passing urine (urination)    Negative: MODERATE vaginal bleeding (i.e., soaking 1 pad / hour; clots)    Negative: Has IUD    MILD vaginal bleeding (i.e., less than 1 pad / hour; less than patient's usual menstrual bleeding; not just spotting)    Protocols used: PREGNANCY - VAGINAL BLEEDING LESS THAN 20 WEEKS EGA-A-AH    "

## 2020-12-12 NOTE — PROGRESS NOTES
Infusion Nursing Note:  Olive Lima presents today for IVF, IV antiemetics.    Patient seen by provider today: No    Note: Reports frequent nausea and vomiting.  Did report to occasional blood in her emesis but says that her doctor is aware of this.     Used an [employer]  to help with this visit.    Intravenous Access:  Peripheral IV placed.      Treatment Conditions:  Not Applicable.      Post Infusion Assessment:  Patient tolerated infusion without incident.  Blood return noted pre and post infusion.  Site patent and intact, free from redness, edema or discomfort.  No evidence of extravasations.  Access discontinued per protocol.    Discharge Plan:   Patient declined prescription refills.  Discharge instructions reviewed with: Patient.  Patient and/or family verbalized understanding of discharge instructions and all questions answered.  Copy of AVS reviewed with patient and/or family.  Patient will call to set up a return visit.  Patient discharged in stable condition accompanied by: self.  Departure Mode: Ambulatory.    Gisele Terrazas RN                    '

## 2020-12-22 ENCOUNTER — PRENATAL OFFICE VISIT (OUTPATIENT)
Dept: OBGYN | Facility: CLINIC | Age: 25
End: 2020-12-22
Payer: COMMERCIAL

## 2020-12-22 ENCOUNTER — PRENATAL OFFICE VISIT (OUTPATIENT)
Dept: NURSING | Facility: CLINIC | Age: 25
End: 2020-12-22
Payer: COMMERCIAL

## 2020-12-22 VITALS
DIASTOLIC BLOOD PRESSURE: 64 MMHG | BODY MASS INDEX: 16.79 KG/M2 | HEART RATE: 91 BPM | WEIGHT: 104 LBS | SYSTOLIC BLOOD PRESSURE: 94 MMHG

## 2020-12-22 DIAGNOSIS — R12 HEARTBURN: ICD-10-CM

## 2020-12-22 DIAGNOSIS — Z34.81 ENCOUNTER FOR SUPERVISION OF OTHER NORMAL PREGNANCY IN FIRST TRIMESTER: Primary | ICD-10-CM

## 2020-12-22 DIAGNOSIS — R51.9 INTRACTABLE HEADACHE, UNSPECIFIED CHRONICITY PATTERN, UNSPECIFIED HEADACHE TYPE: ICD-10-CM

## 2020-12-22 LAB
ABO + RH BLD: NORMAL
ABO + RH BLD: NORMAL
BLD GP AB SCN SERPL QL: NORMAL
BLOOD BANK CMNT PATIENT-IMP: NORMAL
SPECIMEN EXP DATE BLD: NORMAL

## 2020-12-22 PROCEDURE — 36415 COLL VENOUS BLD VENIPUNCTURE: CPT | Performed by: OBSTETRICS & GYNECOLOGY

## 2020-12-22 PROCEDURE — 87389 HIV-1 AG W/HIV-1&-2 AB AG IA: CPT | Performed by: OBSTETRICS & GYNECOLOGY

## 2020-12-22 PROCEDURE — 87340 HEPATITIS B SURFACE AG IA: CPT | Performed by: OBSTETRICS & GYNECOLOGY

## 2020-12-22 PROCEDURE — 86850 RBC ANTIBODY SCREEN: CPT | Performed by: OBSTETRICS & GYNECOLOGY

## 2020-12-22 PROCEDURE — 99207 PR FIRST OB VISIT: CPT | Performed by: OBSTETRICS & GYNECOLOGY

## 2020-12-22 PROCEDURE — 99207 PR NO CHARGE NURSE ONLY: CPT

## 2020-12-22 PROCEDURE — 87086 URINE CULTURE/COLONY COUNT: CPT | Performed by: OBSTETRICS & GYNECOLOGY

## 2020-12-22 PROCEDURE — 86900 BLOOD TYPING SEROLOGIC ABO: CPT | Performed by: OBSTETRICS & GYNECOLOGY

## 2020-12-22 PROCEDURE — 99000 SPECIMEN HANDLING OFFICE-LAB: CPT | Performed by: OBSTETRICS & GYNECOLOGY

## 2020-12-22 PROCEDURE — 86780 TREPONEMA PALLIDUM: CPT | Mod: 90 | Performed by: OBSTETRICS & GYNECOLOGY

## 2020-12-22 PROCEDURE — 86762 RUBELLA ANTIBODY: CPT | Performed by: OBSTETRICS & GYNECOLOGY

## 2020-12-22 PROCEDURE — 86901 BLOOD TYPING SEROLOGIC RH(D): CPT | Performed by: OBSTETRICS & GYNECOLOGY

## 2020-12-22 RX ORDER — PRENATAL VIT/IRON FUM/FOLIC AC 27MG-0.8MG
1 TABLET ORAL DAILY
Qty: 90 TABLET | Refills: 3 | Status: SHIPPED | OUTPATIENT
Start: 2020-12-22 | End: 2022-07-08

## 2020-12-22 RX ORDER — FAMOTIDINE 10 MG
10 TABLET ORAL 2 TIMES DAILY
Qty: 90 TABLET | Refills: 0 | Status: SHIPPED | OUTPATIENT
Start: 2020-12-22 | End: 2021-04-22

## 2020-12-22 RX ORDER — ACETAMINOPHEN 500 MG
500-1000 TABLET ORAL EVERY 6 HOURS PRN
Qty: 30 TABLET | Refills: 1 | Status: SHIPPED | OUTPATIENT
Start: 2020-12-22 | End: 2022-07-08

## 2020-12-22 SDOH — HEALTH STABILITY: MENTAL HEALTH: HOW MANY STANDARD DRINKS CONTAINING ALCOHOL DO YOU HAVE ON A TYPICAL DAY?: NOT ASKED

## 2020-12-22 SDOH — ECONOMIC STABILITY: FOOD INSECURITY: WITHIN THE PAST 12 MONTHS, THE FOOD YOU BOUGHT JUST DIDN'T LAST AND YOU DIDN'T HAVE MONEY TO GET MORE.: NEVER TRUE

## 2020-12-22 SDOH — ECONOMIC STABILITY: TRANSPORTATION INSECURITY
IN THE PAST 12 MONTHS, HAS LACK OF TRANSPORTATION KEPT YOU FROM MEETINGS, WORK, OR FROM GETTING THINGS NEEDED FOR DAILY LIVING?: NO

## 2020-12-22 SDOH — ECONOMIC STABILITY: INCOME INSECURITY: HOW HARD IS IT FOR YOU TO PAY FOR THE VERY BASICS LIKE FOOD, HOUSING, MEDICAL CARE, AND HEATING?: NOT HARD AT ALL

## 2020-12-22 SDOH — ECONOMIC STABILITY: FOOD INSECURITY: WITHIN THE PAST 12 MONTHS, YOU WORRIED THAT YOUR FOOD WOULD RUN OUT BEFORE YOU GOT MONEY TO BUY MORE.: NEVER TRUE

## 2020-12-22 SDOH — HEALTH STABILITY: MENTAL HEALTH: HOW OFTEN DO YOU HAVE 6 OR MORE DRINKS ON ONE OCCASION?: NEVER

## 2020-12-22 SDOH — ECONOMIC STABILITY: TRANSPORTATION INSECURITY
IN THE PAST 12 MONTHS, HAS THE LACK OF TRANSPORTATION KEPT YOU FROM MEDICAL APPOINTMENTS OR FROM GETTING MEDICATIONS?: NO

## 2020-12-22 NOTE — NURSING NOTE
"Chief Complaint   Patient presents with     Prenatal Care     13 weeks 4 days Pap UTD   Labs released        Initial BP 94/64 (BP Location: Left arm, Cuff Size: Adult Regular)   Pulse 91   Wt 47.2 kg (104 lb)   LMP 2020   Breastfeeding No   BMI 16.79 kg/m   Estimated body mass index is 16.79 kg/m  as calculated from the following:    Height as of 10/23/19: 1.676 m (5' 6\").    Weight as of this encounter: 47.2 kg (104 lb).  BP completed using cuff size: regular    Questioned patient about current smoking habits.  Pt. has never smoked.    13w4d      The following HM Due: NONE      +Still very nausea  +some spotting last week X 2 days   +Stomach pain when eats         Terri Jones CMA on 2020 at 1:06 PM               "

## 2020-12-22 NOTE — PROGRESS NOTES
"Chief Complaint   Patient presents with     Prenatal Care     New Prenatal Nurse Telephone Visit   13w4d  Estimated Date of Delivery: 2021      Initial LMP 2020  Estimated body mass index is 17.92 kg/m  as calculated from the following:    Height as of 10/23/19: 1.676 m (5' 6\").    Weight as of 20: 50.3 kg (111 lb).  BP completed using cuff size: NA (Not Taken)    Questioned patient about current smoking habits.  Pt. has never smoked.      The following HM Due: NONE    NPN nurse visit done by telephone via [employer]  # [phone]  Pt will be given NPN folder and book at her upcoming appt.   Discussed optional screening available to assess chromosomal anomalies. Questions answered. Pt advised to call the clinic if she has any questions or concerns related to her pregnancy. Prenatal labs will be obtained at her upcoming appt. New prenatal visit scheduled on 20 with Dr Jordan.        Lab Results   Component Value Date    PAP NIL 2019           Patient supplied answers from flow sheet for:  Prenatal OB Questionnaire.  Past Medical History  Diabetes?: No  Hypertension : No  Heart disease, mitral valve prolapse or rheumatic fever?: No  An autoimmune disease such as lupus or rheumatoid arthritis?: No  Kidney disease or urinary tract infection?: No  Epilepsy, seizures or spells?: No  Migraine headaches?: No  A stroke or loss of function or sensation?: No  Any other neurological problems?: No  Have you ever been treated for depression?: No  Are you having problems with crying spells or loss of self-esteem?: No  Have you ever required psychiatric care?: No  Have you ever had hepatitis, liver disease or jaundice?: No  Have you been treated for blood clots in your veins, deep vein thromosis, inflammation in the veins, thrombosis, phlebitis, pulmonary embolism or varicosities?: No  Have you had excessive bleeding after surgery or dental work?: No  Do you bleed more than other women after " a cut or scratch?: No  Do you have a history of anemia?: No  Have you ever had thyroid problems or taken thyroid medication?: No   Do you have any endocrine problems?: No  Have you ever been in a major accident or suffered serious trauma?: No  Within the last year, has anyone hit, slapped, kicked or otherwise hurt you?: No  In the last year, has anyone forced you to have sex when you didn't want to?: No    Past Medical History 2   Have you ever received a blood transfusion?: No  Would you refuse a blood transfusion if a doctor judged it to be medically necessary?: No   If you answered Yes, would you rather die than receive a blood transfusion?: No  If you answered Yes, is this for Confucianism reasons?: No  Does anyone in your home smoke?: No  Do you use tobacco products?: No  Do you drink beer, wine or hard liquor?: No  Do you use any of the following: marijuana, speed, cocaine, heroin, hallucinogens or other drugs?: No   Is your blood type Rh negative?: No  Have you ever had abnormal antibodies in your blood?: No  Have you ever had asthma?: No  Have you ever had tuberculosis?: No  Do you have any allergies to drugs or over-the-counter medications?: No  Allergies: Dust Mites, Aspartame, Ethanol, Venlafaxine, Hydrochloride, Sertraline: No  Have you had any breast problems?: No  Have you ever ?: (!) Yes  Have you had any gynecological surgical procedures such as cervical conization, a LEEP procedure, laser treatment, cryosurgery of the cervix or a dilation and curettage, etc?: No  Have you ever had any other surgical procedures?: No  Have you been hospitalized for a nonsurgical reason excluding normal delivery?: No  Have you ever had any anesthetic complications?: No  Have you ever had an abnormal pap smear?: No    Past Medical History (Continued)  Do you have a history of abnormalities of the uterus?: No  Did your mother take LILIYA or any other hormones when she was pregnant with you?: No  Did it take you more  than a year to become pregnant?: No  Have you ever been evaluated or treated for infertility?: No  Is there a history of medical problems in your family, which you feel may be important to this pregnancy?: No  Do you have any other problems we have not asked about which you feel may be important to this pregnancy?: No    Symptoms since last menstrual period  Do you have any of the following symptoms: abdominal pain, blood in stools or urine, chest pain, shortness of breath, coughing or vomiting up blood, your heart racing or skipping beats, nausea and vomiting, pain on urination or vaginal discharge or bleed: (!) Yes(nausea and vomiting)  Current medications, including over-the-counter medications, you are using? (If not applicable answer none): none  Will the patient be 35 years old or older at the time of delivery?: No    Has the patient, baby's father or anyone in either family had:  Thalassemia (Italian, Greek, Mediterranean or  background only) and an MCV result less than 80?: No  Neural tube defect such as meningomyelocele, spina bifida or anencephaly?: No  Congenital heart defect?: No  Down's Syndrome?: No  Toribio-Sachs disease (Jew, Cajun, Turkmen-Anguillan)?: No  Sickle cell disease or trait ()?: No  Hemophilia or other inherited problems of blood?: No  Muscular dystrophy?: No  Cystic fibrosis?: No  Stitzer's chorea?: No  Mental retardation/autism?: No  If yes, was the person tested for fragile X?: No  Any other inherited genetic or chromosomal disorder?: No  Maternal metabolic disorder (e.g Insulin-dependent diabetes, PKU)?: No  A child with birth defects not listed above?: No  Recurrent pregnancy loss or stillbirth?: No   Has the patient had any medications/street drugs/alcohol since her last menstrual period?: No  Does the patient or baby's father have any other genetic risks?: No    Infection History   Do you object to being tested for Hepatitis B?: No  Do you object to being tested for  HIV?: No   Do you feel that you are at high risk for coming in contact with the AIDS virus?: No  Have you ever been treated for tuberculosis?: No  Have you ever had a positive skin test for tuberculosis?: No  Do you live with someone who has tuberculosis?: No  Have you ever been exposed to tuberculosis?: No  Do you have genital herpes?: No  Does your partner have genital herpes?: No  Have you ever had gonorrhea, chlamydia, syphilis, venereal warts, trichomoniasis, pelvic inflammatory disease or any other sexually transmitted disease?: No  Do you know if you are a genital group B streptococcus carrier?: No  Have you had chicken pox/varicella?: Unknown(previous labs show immunity)   Have you been vaccinated against chicken Pox?: Unknown  Have you had any other infectious diseases?: No    Kerry Pemberton RN

## 2020-12-22 NOTE — PROGRESS NOTES
Olive is a 25 year old  @ 13w4d by 8w4d ultrasound, here for new ob visit.      Reports improvement in nausea and vomiting with her visits to the infusion therapy center for IV hydration and IV antiemetics. Still taking PO anti-emetics as well.   Does acknowledge constipation.   She also had one episode of scant vaginal bleeding but has resolved and has not recurred. Denies pelvic cramping.   Reports normal urinary function.   Also complaints of headaches and heart burn. Inquires as to what medication she can take to address these.     ROS: Ten point review of systems was reviewed and negative except the above.    OBhx:  x 1 (complicated by hyperemesis gravidarum with need for frequent IV fluid hydration, IV and PO anti-emetics at the infusion therapy center)   Gyne: Denies hx of STIs or abnormal Pap smears.     Past Medical History:   Diagnosis Date     Hyperemesis of pregnancy      Past Surgical History:   Procedure Laterality Date     NO HISTORY OF SURGERY       Patient Active Problem List    Diagnosis Date Noted     Nausea and vomiting of pregnancy, antepartum 2020     Priority: Medium     Postpartum state 2019     Priority: Medium      No Known Allergies       metoclopramide (REGLAN) 5 MG tablet, Take 1-2 tablets (5-10 mg) by mouth 4 times daily as needed (nausea, vomiting) (Patient not taking: Reported on 2020)       ondansetron (ZOFRAN-ODT) 4 MG ODT tab, Take 1-2 tablets (4-8 mg) by mouth every 6 hours as needed for nausea (Patient not taking: Reported on 2020)       Prenatal Vit-Fe Fumarate-FA (PRENATAL MULTIVITAMIN W/IRON) 27-0.8 MG tablet, Take 1 tablet by mouth daily (Patient not taking: Reported on 2020)    No current facility-administered medications on file prior to visit.       Past Medical History of Father of Baby:   No significant medical history    Physical Exam: BP 94/64 (BP Location: Left arm, Cuff Size: Adult Regular)   Pulse 91   Wt 47.2 kg (104  lb)   LMP 2020   Breastfeeding No   BMI 16.79 kg/m    General: Well developed, well nourished female  Skin: No lesions, Warm, moist and No cyanosis or pallor  HEENT: Atraumatic, normocephalic  Neck: Supple,no adenopathy,thyroid normal  Chest: Clear to auscultation  Heart: Regular rate, rhythm  Breasts: deferred   Abdomen: Soft, flat, non-tender   Extremities: No cyanosis, clubbing, warm and well perfused and No edema  Neurological: Mental Status Normal and Station and Gait Normal   Perineum: deferred   Vulva: deferred  Vagina: deferred  Cervix: deferred  Uterus: deferred   Adnexa: deferred  Rectum: deferred   Bony Pelvis: Adequate based on history     A/P 25 year old  at 13w4d by 13w4d US    1. Discussed physician coverage, tertiary support, diet, exercise, weight gain, schedule of visits, routine and indicated ultrasounds, and childbirth education.    2. Options for  testing for chromosomal and birth defects were discussed with the patient including nuchal lucency/blood marker testing in the first trimester and quad screening and/or Level 2 ultrasound in the second trimester.  We discussed that these are screening tests and not diagnostic tests and that false positives and negatives are a distinct possibility.  We discussed that follow up diagnostic testing would include chorionic villus sampling or amniocentesis depending on gestational age. Also, discussed NIPT as well. Declines testing.     3. Prenatal labs    4. Prenatal Vitamins prescribed. Pepcid prescribed for GERD. Tylenol prescribed for headache. MiraLax prescribed for constipation    5. RTC in 4 weeks. SAB precautions reviewed     Julian Rodriguez MD  [facility]

## 2020-12-23 LAB
BACTERIA SPEC CULT: NO GROWTH
Lab: NORMAL
RUBV IGG SERPL IA-ACNC: 45 IU/ML
SPECIMEN SOURCE: NORMAL
T PALLIDUM AB SER QL: NONREACTIVE

## 2020-12-24 LAB
HBV SURFACE AG SERPL QL IA: NONREACTIVE
HIV 1+2 AB+HIV1 P24 AG SERPL QL IA: NONREACTIVE

## 2021-01-18 ENCOUNTER — PRENATAL OFFICE VISIT (OUTPATIENT)
Dept: OBGYN | Facility: CLINIC | Age: 26
End: 2021-01-18

## 2021-01-18 VITALS
BODY MASS INDEX: 17.22 KG/M2 | HEART RATE: 76 BPM | SYSTOLIC BLOOD PRESSURE: 98 MMHG | DIASTOLIC BLOOD PRESSURE: 66 MMHG | WEIGHT: 106.7 LBS

## 2021-01-18 DIAGNOSIS — Z34.82 ENCOUNTER FOR SUPERVISION OF OTHER NORMAL PREGNANCY IN SECOND TRIMESTER: Primary | ICD-10-CM

## 2021-01-18 PROCEDURE — T1013 SIGN LANG/ORAL INTERPRETER: HCPCS | Mod: U3 | Performed by: OBSTETRICS & GYNECOLOGY

## 2021-01-18 NOTE — NURSING NOTE
"Chief Complaint   Patient presents with     Prenatal Care     17 weeks 3 days         Initial BP 98/66 (BP Location: Left arm, Cuff Size: Adult Regular)   Pulse 76   Wt 48.4 kg (106 lb 11.2 oz)   LMP 2020   Breastfeeding No   BMI 17.22 kg/m   Estimated body mass index is 17.22 kg/m  as calculated from the following:    Height as of 10/23/19: 1.676 m (5' 6\").    Weight as of this encounter: 48.4 kg (106 lb 11.2 oz).  BP completed using cuff size: regular    Questioned patient about current smoking habits.  Pt. has never smoked.    17w3d      The following HM Due: NONE      +Some fetal movement   + Feeling better   +Mild headaches with some dizziness        Terri Jones, [facility] on 2021 at 1:48 PM    "

## 2021-01-18 NOTE — PROGRESS NOTES
Doing well.   Feels a lot better from a nausea/vomiting standpoint. Still has headaches from time to time but also is having dizzy spells as well.   Denies VB, cramping.   Does endorse fetal movements  BP 98/66 (BP Location: Left arm, Cuff Size: Adult Regular)   Pulse 76   Wt 48.4 kg (106 lb 11.2 oz)   LMP 2020   Breastfeeding No   BMI 17.22 kg/m    General Appearance: NAD  Abdomen: Gravid, NT  Refer to flow sheet above.   A/P: 25 year old  at 17w3d  -- above concerns addressed; recommended adequate oral fluid hydration as well as use of Tylenol PO and Reglan PO if needed  -- fetal anatomy ultrasound ordered  -- SAB precautions reviewed  RTC in 4 weeks    Julian Rodriguez MD  [facility]

## 2021-02-08 ENCOUNTER — ANCILLARY PROCEDURE (OUTPATIENT)
Dept: ULTRASOUND IMAGING | Facility: CLINIC | Age: 26
End: 2021-02-08
Attending: OBSTETRICS & GYNECOLOGY
Payer: MEDICAID

## 2021-02-08 ENCOUNTER — PRENATAL OFFICE VISIT (OUTPATIENT)
Dept: OBGYN | Facility: CLINIC | Age: 26
End: 2021-02-08
Payer: MEDICAID

## 2021-02-08 VITALS
BODY MASS INDEX: 18.08 KG/M2 | OXYGEN SATURATION: 100 % | HEART RATE: 64 BPM | DIASTOLIC BLOOD PRESSURE: 60 MMHG | SYSTOLIC BLOOD PRESSURE: 112 MMHG | WEIGHT: 112 LBS

## 2021-02-08 DIAGNOSIS — Z34.82 ENCOUNTER FOR SUPERVISION OF OTHER NORMAL PREGNANCY IN SECOND TRIMESTER: ICD-10-CM

## 2021-02-08 DIAGNOSIS — Z34.82 ENCOUNTER FOR SUPERVISION OF OTHER NORMAL PREGNANCY IN SECOND TRIMESTER: Primary | ICD-10-CM

## 2021-02-08 PROCEDURE — 76805 OB US >/= 14 WKS SNGL FETUS: CPT | Performed by: OBSTETRICS & GYNECOLOGY

## 2021-02-08 PROCEDURE — T1013 SIGN LANG/ORAL INTERPRETER: HCPCS | Mod: GT

## 2021-02-08 PROCEDURE — 99212 OFFICE O/P EST SF 10 MIN: CPT | Performed by: OBSTETRICS & GYNECOLOGY

## 2021-02-08 PROCEDURE — T1013 SIGN LANG/ORAL INTERPRETER: HCPCS | Mod: GT | Performed by: OBSTETRICS & GYNECOLOGY

## 2021-02-08 NOTE — PROGRESS NOTES
Doing well.   Just had her fetal anatomy ultrasound today and was told that she is having a Girl  Concerned about her lack of appetite and how that may impact the health of her baby.   Denies VB, ctx, LOF. +FM  /60   Pulse 64   Wt 50.8 kg (112 lb)   LMP 2020   SpO2 100%   BMI 18.08 kg/m    General Appearance: NAD  Abdomen: Gravid, NT  Refer to flow sheet above.   A/P: 25 year old  at 20w3d  -- above concerns addressed, reassurance provided  -- PTL precautions reviewed  RTC in 4 weeks     Julian Rodriguez MD  [facility]

## 2021-02-08 NOTE — NURSING NOTE
"Chief Complaint   Patient presents with     Prenatal Care   20w3d    initial /60   Pulse 64   Wt 50.8 kg (112 lb)   LMP 09/24/2020   SpO2 100%   BMI 18.08 kg/m   Estimated body mass index is 18.08 kg/m  as calculated from the following:    Height as of 10/23/19: 1.676 m (5' 6\").    Weight as of this encounter: 50.8 kg (112 lb).  BP completed using cuff size regular.  Madhuri Pagan CMA    "

## 2021-02-09 ENCOUNTER — APPOINTMENT (OUTPATIENT)
Dept: INTERPRETER SERVICES | Facility: CLINIC | Age: 26
End: 2021-02-09
Payer: MEDICAID

## 2021-03-08 ENCOUNTER — PRENATAL OFFICE VISIT (OUTPATIENT)
Dept: OBGYN | Facility: CLINIC | Age: 26
End: 2021-03-08
Payer: COMMERCIAL

## 2021-03-08 VITALS — WEIGHT: 117 LBS | BODY MASS INDEX: 18.88 KG/M2 | SYSTOLIC BLOOD PRESSURE: 104 MMHG | DIASTOLIC BLOOD PRESSURE: 66 MMHG

## 2021-03-08 DIAGNOSIS — Z34.82 ENCOUNTER FOR SUPERVISION OF OTHER NORMAL PREGNANCY IN SECOND TRIMESTER: Primary | ICD-10-CM

## 2021-03-08 PROCEDURE — 99212 OFFICE O/P EST SF 10 MIN: CPT | Performed by: OBSTETRICS & GYNECOLOGY

## 2021-03-08 NOTE — NURSING NOTE
"Chief Complaint   Patient presents with     Prenatal Care     24w 3d        Initial /66   Wt 53.1 kg (117 lb)   LMP 2020   BMI 18.88 kg/m   Estimated body mass index is 18.88 kg/m  as calculated from the following:    Height as of 10/23/19: 1.676 m (5' 6\").    Weight as of this encounter: 53.1 kg (117 lb).  BP completed using cuff size: regular    Questioned patient about current smoking habits.  Pt. has never smoked.          Dominiqeu Young, [facility]       "

## 2021-03-08 NOTE — PROGRESS NOTES
Doing well.   No complaints.   Denies VB, ctx, LOF. +FM  /66   Wt 53.1 kg (117 lb)   LMP 2020   BMI 18.88 kg/m    General Appearance: NAD  Abdomen: Gravid, NT  Refer to flow sheet above.   A/P: 25 year old  at 24w3d  -- PTL precautions reviewed  RTC in 4 weeks for 1 hr GCT, CBC and RPR    Julian Rodriguez MD  [facility]

## 2021-04-06 ENCOUNTER — PRENATAL OFFICE VISIT (OUTPATIENT)
Dept: OBGYN | Facility: CLINIC | Age: 26
End: 2021-04-06
Payer: COMMERCIAL

## 2021-04-06 VITALS
SYSTOLIC BLOOD PRESSURE: 104 MMHG | BODY MASS INDEX: 19.93 KG/M2 | DIASTOLIC BLOOD PRESSURE: 64 MMHG | HEART RATE: 80 BPM | WEIGHT: 123.5 LBS

## 2021-04-06 DIAGNOSIS — R30.0 DYSURIA: ICD-10-CM

## 2021-04-06 DIAGNOSIS — Z34.83 ENCOUNTER FOR SUPERVISION OF OTHER NORMAL PREGNANCY IN THIRD TRIMESTER: Primary | ICD-10-CM

## 2021-04-06 LAB
ALBUMIN UR-MCNC: NEGATIVE MG/DL
APPEARANCE UR: CLEAR
BILIRUB UR QL STRIP: NEGATIVE
COLOR UR AUTO: YELLOW
ERYTHROCYTE [DISTWIDTH] IN BLOOD BY AUTOMATED COUNT: 13.1 % (ref 10–15)
GLUCOSE 1H P 50 G GLC PO SERPL-MCNC: 103 MG/DL (ref 60–129)
GLUCOSE UR STRIP-MCNC: 100 MG/DL
HCT VFR BLD AUTO: 35.4 % (ref 35–47)
HGB BLD-MCNC: 12 G/DL (ref 11.7–15.7)
HGB UR QL STRIP: ABNORMAL
KETONES UR STRIP-MCNC: NEGATIVE MG/DL
LEUKOCYTE ESTERASE UR QL STRIP: ABNORMAL
MCH RBC QN AUTO: 28.8 PG (ref 26.5–33)
MCHC RBC AUTO-ENTMCNC: 33.9 G/DL (ref 31.5–36.5)
MCV RBC AUTO: 85 FL (ref 78–100)
NITRATE UR QL: NEGATIVE
NON-SQ EPI CELLS #/AREA URNS LPF: ABNORMAL /LPF
PH UR STRIP: 6 PH (ref 5–7)
PLATELET # BLD AUTO: 145 10E9/L (ref 150–450)
RBC # BLD AUTO: 4.16 10E12/L (ref 3.8–5.2)
RBC #/AREA URNS AUTO: ABNORMAL /HPF
SOURCE: ABNORMAL
SP GR UR STRIP: 1.01 (ref 1–1.03)
UROBILINOGEN UR STRIP-ACNC: 0.2 EU/DL (ref 0.2–1)
WBC # BLD AUTO: 5.4 10E9/L (ref 4–11)
WBC #/AREA URNS AUTO: ABNORMAL /HPF

## 2021-04-06 PROCEDURE — 81001 URINALYSIS AUTO W/SCOPE: CPT | Performed by: OBSTETRICS & GYNECOLOGY

## 2021-04-06 PROCEDURE — 87086 URINE CULTURE/COLONY COUNT: CPT | Performed by: OBSTETRICS & GYNECOLOGY

## 2021-04-06 PROCEDURE — 99000 SPECIMEN HANDLING OFFICE-LAB: CPT | Performed by: OBSTETRICS & GYNECOLOGY

## 2021-04-06 PROCEDURE — 99207 PR PRENATAL VISIT: CPT | Performed by: OBSTETRICS & GYNECOLOGY

## 2021-04-06 PROCEDURE — 86780 TREPONEMA PALLIDUM: CPT | Mod: 90 | Performed by: OBSTETRICS & GYNECOLOGY

## 2021-04-06 PROCEDURE — 36415 COLL VENOUS BLD VENIPUNCTURE: CPT | Performed by: OBSTETRICS & GYNECOLOGY

## 2021-04-06 PROCEDURE — 85027 COMPLETE CBC AUTOMATED: CPT | Performed by: OBSTETRICS & GYNECOLOGY

## 2021-04-06 PROCEDURE — 82950 GLUCOSE TEST: CPT | Performed by: OBSTETRICS & GYNECOLOGY

## 2021-04-06 RX ORDER — NITROFURANTOIN 25; 75 MG/1; MG/1
100 CAPSULE ORAL 2 TIMES DAILY
Qty: 14 CAPSULE | Refills: 0 | Status: SHIPPED | OUTPATIENT
Start: 2021-04-06 | End: 2021-04-13

## 2021-04-06 NOTE — PROGRESS NOTES
Doing well.   Complaints of pain with urination for the last few days.   Denies VB, ctx, LOF. +FM  /64 (BP Location: Left arm, Cuff Size: Adult Regular)   Pulse 80   Wt 56 kg (123 lb 8 oz)   LMP 2020   Breastfeeding No   BMI 19.93 kg/m    General Appearance: NAD  Abdomen: Gravid, NT  Refer to flow sheet above.   A/P: 25 year old  at 28w4d  -- dysuria: macrobid prescribed empirically, but will follow-up on UA and urine culture collected to affirm diagnosis as well as to affirm appropriate antibiotic therapy based on sensitivity profile  -- growth ultrasound ordered for size less than dates  -- 1 hr GCT, CBC and RPR testing today  -- PTL precautions reviewed  RTC in 2 weeks    Julian Rodriguez MD  [facility]

## 2021-04-07 LAB
BACTERIA SPEC CULT: NO GROWTH
Lab: NORMAL
SPECIMEN SOURCE: NORMAL
T PALLIDUM AB SER QL: NONREACTIVE

## 2021-04-08 NOTE — RESULT ENCOUNTER NOTE
Inform patient that her urine culture returned with no growth of bacteria, indicating that her symptoms are not likely being caused by a UTI. Therefore, the antibiotics prescribed to her (Macrobid BID dosing x 7 days) for empiric therapy of UTI can be stopped.  Thank you.     Julian Rodriguez MD

## 2021-04-20 ENCOUNTER — APPOINTMENT (OUTPATIENT)
Dept: INTERPRETER SERVICES | Facility: CLINIC | Age: 26
End: 2021-04-20
Payer: COMMERCIAL

## 2021-04-20 ENCOUNTER — ANCILLARY PROCEDURE (OUTPATIENT)
Dept: ULTRASOUND IMAGING | Facility: CLINIC | Age: 26
End: 2021-04-20
Attending: OBSTETRICS & GYNECOLOGY
Payer: COMMERCIAL

## 2021-04-20 DIAGNOSIS — O28.3 ABNORMAL FETAL ULTRASOUND: Primary | ICD-10-CM

## 2021-04-20 DIAGNOSIS — Z34.83 ENCOUNTER FOR SUPERVISION OF OTHER NORMAL PREGNANCY IN THIRD TRIMESTER: ICD-10-CM

## 2021-04-20 PROCEDURE — 76816 OB US FOLLOW-UP PER FETUS: CPT | Performed by: OBSTETRICS & GYNECOLOGY

## 2021-04-20 NOTE — RESULT ENCOUNTER NOTE
Can we order an MFM ultrasound given her abdominal circumference being less that the 10th percentile as well as EFW less than 10th percentile.   Thank you.     Julian Rodriguez MD

## 2021-04-21 ENCOUNTER — APPOINTMENT (OUTPATIENT)
Dept: INTERPRETER SERVICES | Facility: CLINIC | Age: 26
End: 2021-04-21
Payer: COMMERCIAL

## 2021-04-21 ENCOUNTER — TRANSCRIBE ORDERS (OUTPATIENT)
Dept: MATERNAL FETAL MEDICINE | Facility: CLINIC | Age: 26
End: 2021-04-21

## 2021-04-21 DIAGNOSIS — O26.90 PREGNANCY RELATED CONDITION: Primary | ICD-10-CM

## 2021-04-22 ENCOUNTER — OFFICE VISIT (OUTPATIENT)
Dept: MATERNAL FETAL MEDICINE | Facility: CLINIC | Age: 26
End: 2021-04-22
Attending: OBSTETRICS & GYNECOLOGY
Payer: COMMERCIAL

## 2021-04-22 ENCOUNTER — HOSPITAL ENCOUNTER (OUTPATIENT)
Dept: ULTRASOUND IMAGING | Facility: CLINIC | Age: 26
End: 2021-04-22
Attending: OBSTETRICS & GYNECOLOGY
Payer: COMMERCIAL

## 2021-04-22 ENCOUNTER — PRENATAL OFFICE VISIT (OUTPATIENT)
Dept: OBGYN | Facility: CLINIC | Age: 26
End: 2021-04-22
Payer: COMMERCIAL

## 2021-04-22 ENCOUNTER — PRE VISIT (OUTPATIENT)
Dept: MATERNAL FETAL MEDICINE | Facility: CLINIC | Age: 26
End: 2021-04-22

## 2021-04-22 VITALS — DIASTOLIC BLOOD PRESSURE: 70 MMHG | BODY MASS INDEX: 20.5 KG/M2 | WEIGHT: 127 LBS | SYSTOLIC BLOOD PRESSURE: 120 MMHG

## 2021-04-22 DIAGNOSIS — O26.90 PREGNANCY RELATED CONDITION: ICD-10-CM

## 2021-04-22 DIAGNOSIS — O28.3 ABNORMAL PRENATAL ULTRASOUND: ICD-10-CM

## 2021-04-22 DIAGNOSIS — Z34.83 ENCOUNTER FOR SUPERVISION OF OTHER NORMAL PREGNANCY IN THIRD TRIMESTER: Primary | ICD-10-CM

## 2021-04-22 DIAGNOSIS — O28.3 ABNORMAL PRENATAL ULTRASOUND: Primary | ICD-10-CM

## 2021-04-22 DIAGNOSIS — O36.5990 POOR FETAL GROWTH AFFECTING MANAGEMENT OF MOTHER, ANTEPARTUM, SINGLE OR UNSPECIFIED FETUS: Primary | ICD-10-CM

## 2021-04-22 DIAGNOSIS — O36.5930 POOR FETAL GROWTH AFFECTING MANAGEMENT OF MOTHER IN THIRD TRIMESTER, SINGLE OR UNSPECIFIED FETUS: ICD-10-CM

## 2021-04-22 PROCEDURE — 76811 OB US DETAILED SNGL FETUS: CPT | Mod: 26 | Performed by: OBSTETRICS & GYNECOLOGY

## 2021-04-22 PROCEDURE — 99207 PR PRENATAL VISIT: CPT | Performed by: OBSTETRICS & GYNECOLOGY

## 2021-04-22 PROCEDURE — 999N000069 HC STATISTIC GENETIC COUNSELING, < 16 MIN: Performed by: GENETIC COUNSELOR, MS

## 2021-04-22 PROCEDURE — 59025 FETAL NON-STRESS TEST: CPT

## 2021-04-22 PROCEDURE — 76820 UMBILICAL ARTERY ECHO: CPT

## 2021-04-22 NOTE — NURSING NOTE
"Chief Complaint   Patient presents with     Prenatal Care     30 6/7 weeks       Initial /70   Wt 57.6 kg (127 lb)   LMP 2020   BMI 20.50 kg/m   Estimated body mass index is 20.5 kg/m  as calculated from the following:    Height as of 10/23/19: 1.676 m (5' 6\").    Weight as of this encounter: 57.6 kg (127 lb).  BP completed using cuff size: regular    Questioned patient about current smoking habits.  Pt. has never smoked.          The following HM Due: NONE    +fetal movement  -swelling    Modesta Rosa, CMA    "

## 2021-04-22 NOTE — PROGRESS NOTES
"[facility]  Genetic Counseling Consult    Patient:  Olive Lima YOB: 1995   Date of Service:  21      Olive Lima was seen at the [facility] for genetic consultation for the indication of fetal growth restriction. Both an iPad and a phone [employer]  were used during the visit as the iPad  malfunctioned partway through the visit.       Impression/Plan:   1.  Olive had a brief genetic consultation today regarding the fetal growth restriction identified today. She met with Dr. Lindsay Jon before our session to discuss outcomes during the pregnancy.     2.  The patient declines genetic amniocentesis and maternal serum screening today.    Pregnancy History:   /Parity:    Age at Delivery: 25 year old  PRESTON: 2021, by Ultrasound  Gestational Age: 30w6d    No significant complications or exposures were reported in the current pregnancy.    Olive s pregnancy history is significant for one prior term delivery, female, alive and well.       Risk Assessment:   We explained that the risk for fetal chromosome abnormalities increases with maternal age. We discussed specific features of common chromosome abnormalities, including Down syndrome, trisomy 13, trisomy 18, and sex chromosome conditions.       - At age 25 at midtrimester, the risk to have a baby with Down syndrome is 1 in 990.    - At age 25 at midtrimester, the risk to have a baby with any chromosome abnormality is 1 in 495.        Intrauterine Growth Restriction (IUGR)    Intrauterine fetal growth restriction (IUGR) is commonly defined as an estimated fetal weight below the tenth percentile for any gestational age. Pregnancies with IUGR typically have more significant growth restriction than pregnancies with \"small for gestational age\" which can sometimes be constitutionally small, for example, due to parental size and " height. Establishing accurate dating is always important before classifying IUGR.    Classifications of IUGR    In 70-80% of IUGR the growth restriction is asymmetrical which means the abdominal circumference is more significantly decreased then the head and femurs. In 20-30% of IUGR the growth restriction is symmetrical and the head, abdomen, and femurs are proportionally decreased. Fetuses with asymmetrical IUGR have been noted to have a higher risk for major anomalies, low birth weight,  mortality, hypertensive disorders of pregnancy,  delivery, and overall poor outcomes.     Causes of IUGR    Maternal factors: Age, race, socioeconomic status, maternal weight at birth, low pre-pregnancy weight, chronic maternal hypoxia, exposures (smoking), small or large interpregnancy interval, assisted reproductive technologies, maternal diease (ex preeclampsia, chronic hypertension, lupus), malnutrition and gastrointestinal conditions (ex ulcerative colitis), and hematologic disorders    Fetal factors:     5-20% of IUGR cases will be due to a fetal genetic factor  o Aneuploidy (ex trisomy 18)  o Confined placental mosaicism (CPM) for trisomy 16 (CPM is three times more common in IUGR pregnancies)  o Microdeletion syndrome (ex Jean-Hirschhorn and Cri du chat syndrome)  o Ring chromosomes  o Uniparental disomy of chromosome 6, 14, 16  o Inborn errors of metabolism  o Single gene disorders (ex Mason de Sierra syndrome)    10% of IUGR will also have a congenital anomaly   o Single umbilical artery  o Anencephaly  o Omphalocele/ gastroschisis   o Congenital diaphragmatic hernia  o Renal agenesis/dysplasoia  o Cardiac malformations     In utero infections accounts for less than 5-10% of IUGR cases  o Viral infections (ex cytomegalovirus [CMV], herpes)  o Parasitic infections (ex toxoplasmosis)    Multiple gestation pregnancies like twins and triplets have a higher chance of IUGR     Placental Factors:  Insufficiency, low placental weight, placenta acreeta, placental abruption, confined placental mosaicism, partial molar pregnancy, single umbilical artery, velamentous cord insertion, abnormal first trimester screening biochemical markers     Evaluation and Recommendations     Additional ultrasounds and monitoring throughout the pregnancy are common when IUGR is detected. Commonly growth ultrasounds are performed every few weeks as well as studies of the blood flow through the umbilical artery.     Genetic amniocentesis is recommended to consider. Since amniocentesis is an invasive type of procedure, there is a 1 in 500 chance for complication including miscarriage. Typically a chromosome microarray analysis is ordered to look for common genetic causes like aneuploidy and microdeletion syndromes. If other ultrasound findings or family history suggest the likelihood of other genetic conditions then a panel or single gene test may be recommended. Infection studies, such as CMV, is commonly ordered on the amniotic fluid as well. Depending on the gestational age, maternal serology may also be collected to aid in the interpretation of the amniotic fluid infection results.     Non-invasive methods are also available and would likely include cell-free DNA aneuploidy screening for common trisomies (NIPT) and maternal serology studies.     Evaluation and referral to a pediatric geneticist would be recommended if there are any other concerns such as abnormalities, dysmorphic features, developmental delays, or growth problems after delivery.      After our discussion, Olive DECLINED both NIPT and amniocentesis. She stated that she would like to leave this information up to God and that she is primarily concerned about making sure that baby is born living. We discussed that she will be monitored closely with ultrasound and that additional testing could always be performed after delivery.      It was a pleasure to be involved  in [facility]'s care. Face-to-face time was approximately 20 minutes.      Sravani Quintero MS, [facility]  Licensed Genetic Counselor  [facility]  Maternal Fetal Medicine  [email or web address]  [phone]

## 2021-04-22 NOTE — PROGRESS NOTES
Doing well.   No complaints.   Denies VB, ctx, LOF. +FM  /70   Wt 57.6 kg (127 lb)   LMP 2020   BMI 20.50 kg/m    General Appearance: NAD  Abdomen: Gravid, NT  Refer to flow sheet above.   A/P: 25 year old  at 30w6d  -- IUGR: has scheduled M ultrasound appointment today  -- PTL precautions reviewed  RTC in 2 weeks    Julian Rodriguez MD  [facility]

## 2021-04-22 NOTE — PROGRESS NOTES
Olive Lima was seen for an ultrasound today at the Maternal-Fetal Medicine center.      For the details of the ultrasound please see the report which can be found under the imaging tab.      Rachelle Jon MD  , OB/GYN  Maternal-Fetal Medicine  [email or web address]  [phone] (Main MFM Office)  [phone] or [phone] (for 24 hour MFM questions)  [phone] (Pager)

## 2021-04-28 ENCOUNTER — OFFICE VISIT (OUTPATIENT)
Dept: MATERNAL FETAL MEDICINE | Facility: CLINIC | Age: 26
End: 2021-04-28
Attending: OBSTETRICS & GYNECOLOGY
Payer: COMMERCIAL

## 2021-04-28 ENCOUNTER — HOSPITAL ENCOUNTER (OUTPATIENT)
Dept: ULTRASOUND IMAGING | Facility: CLINIC | Age: 26
End: 2021-04-28
Attending: OBSTETRICS & GYNECOLOGY
Payer: COMMERCIAL

## 2021-04-28 DIAGNOSIS — O36.5990 POOR FETAL GROWTH AFFECTING MANAGEMENT OF MOTHER, ANTEPARTUM, SINGLE OR UNSPECIFIED FETUS: ICD-10-CM

## 2021-04-28 PROCEDURE — 59025 FETAL NON-STRESS TEST: CPT | Mod: 26 | Performed by: OBSTETRICS & GYNECOLOGY

## 2021-04-28 PROCEDURE — 59025 FETAL NON-STRESS TEST: CPT

## 2021-04-28 PROCEDURE — 76820 UMBILICAL ARTERY ECHO: CPT | Mod: 26 | Performed by: OBSTETRICS & GYNECOLOGY

## 2021-04-28 PROCEDURE — 76820 UMBILICAL ARTERY ECHO: CPT

## 2021-04-28 PROCEDURE — 76815 OB US LIMITED FETUS(S): CPT | Mod: 26 | Performed by: OBSTETRICS & GYNECOLOGY

## 2021-04-28 NOTE — PROGRESS NOTES
"Please see \"Imaging\" tab under Chart Review for full details.    Belle Mcdonald MD  Maternal Fetal Medicine    "

## 2021-05-04 ENCOUNTER — PRENATAL OFFICE VISIT (OUTPATIENT)
Dept: OBGYN | Facility: CLINIC | Age: 26
End: 2021-05-04
Payer: COMMERCIAL

## 2021-05-04 VITALS
DIASTOLIC BLOOD PRESSURE: 70 MMHG | HEART RATE: 93 BPM | SYSTOLIC BLOOD PRESSURE: 112 MMHG | BODY MASS INDEX: 21.19 KG/M2 | WEIGHT: 131.3 LBS

## 2021-05-04 DIAGNOSIS — O36.5930 POOR FETAL GROWTH AFFECTING MANAGEMENT OF MOTHER IN THIRD TRIMESTER, SINGLE OR UNSPECIFIED FETUS: Primary | ICD-10-CM

## 2021-05-04 PROCEDURE — 99207 PR PRENATAL VISIT: CPT | Performed by: OBSTETRICS & GYNECOLOGY

## 2021-05-04 NOTE — NURSING NOTE
"Chief Complaint   Patient presents with     Prenatal Care     32 weeks 4 days   MFM 2021       Initial /70 (BP Location: Left arm, Cuff Size: Adult Regular)   Pulse 93   Wt 59.6 kg (131 lb 4.8 oz)   LMP 2020   Breastfeeding No   BMI 21.19 kg/m   Estimated body mass index is 21.19 kg/m  as calculated from the following:    Height as of 10/23/19: 1.676 m (5' 6\").    Weight as of this encounter: 59.6 kg (131 lb 4.8 oz).  BP completed using cuff size: regular    Questioned patient about current smoking habits.  Pt. has never smoked.    32w4d      The following HM Due: NONE    +FM daily   +MFM  and 2021       Terri Jones, CMA on 2021 at 3:11 PM                  "

## 2021-05-04 NOTE — PROGRESS NOTES
Doing well.   No complaints.   Denies VB, ctx, LOF. +FM  /70 (BP Location: Left arm, Cuff Size: Adult Regular)   Pulse 93   Wt 59.6 kg (131 lb 4.8 oz)   LMP 2020   Breastfeeding No   BMI 21.19 kg/m    General Appearance: NAD  Abdomen: Gravid, NT  Refer to flow sheet above.   A/P: 25 year old  at 32w4d  -- IUGR: ongoing  ultrasound surveillance with MFM; has future ultrasound appointments with them  -- PTL precautions reviewed  RTC in 2 weeks    Julian Rodriguez MD  [facility]

## 2021-05-05 ENCOUNTER — HOSPITAL ENCOUNTER (OUTPATIENT)
Dept: ULTRASOUND IMAGING | Facility: CLINIC | Age: 26
End: 2021-05-05
Attending: OBSTETRICS & GYNECOLOGY
Payer: COMMERCIAL

## 2021-05-05 ENCOUNTER — OFFICE VISIT (OUTPATIENT)
Dept: MATERNAL FETAL MEDICINE | Facility: CLINIC | Age: 26
End: 2021-05-05
Attending: OBSTETRICS & GYNECOLOGY
Payer: COMMERCIAL

## 2021-05-05 DIAGNOSIS — O36.5990 POOR FETAL GROWTH AFFECTING MANAGEMENT OF MOTHER, ANTEPARTUM, SINGLE OR UNSPECIFIED FETUS: ICD-10-CM

## 2021-05-05 PROCEDURE — 76819 FETAL BIOPHYS PROFIL W/O NST: CPT

## 2021-05-05 PROCEDURE — 76820 UMBILICAL ARTERY ECHO: CPT | Mod: 26 | Performed by: OBSTETRICS & GYNECOLOGY

## 2021-05-05 PROCEDURE — 76818 FETAL BIOPHYS PROFILE W/NST: CPT | Mod: 26 | Performed by: OBSTETRICS & GYNECOLOGY

## 2021-05-05 PROCEDURE — 59025 FETAL NON-STRESS TEST: CPT

## 2021-05-05 PROCEDURE — 76815 OB US LIMITED FETUS(S): CPT | Mod: 26 | Performed by: OBSTETRICS & GYNECOLOGY

## 2021-05-05 NOTE — PROGRESS NOTES
Please see ultrasound report under Imaging tab for details of today's ultrasound.    Treasure Childers MD  Maternal-Fetal Medicine

## 2021-05-11 ENCOUNTER — APPOINTMENT (OUTPATIENT)
Dept: INTERPRETER SERVICES | Facility: CLINIC | Age: 26
End: 2021-05-11
Payer: COMMERCIAL

## 2021-05-12 ENCOUNTER — OFFICE VISIT (OUTPATIENT)
Dept: MATERNAL FETAL MEDICINE | Facility: CLINIC | Age: 26
End: 2021-05-12
Attending: OBSTETRICS & GYNECOLOGY
Payer: COMMERCIAL

## 2021-05-12 ENCOUNTER — HOSPITAL ENCOUNTER (OUTPATIENT)
Dept: ULTRASOUND IMAGING | Facility: CLINIC | Age: 26
End: 2021-05-12
Attending: OBSTETRICS & GYNECOLOGY
Payer: COMMERCIAL

## 2021-05-12 DIAGNOSIS — O36.5990 POOR FETAL GROWTH AFFECTING MANAGEMENT OF MOTHER, ANTEPARTUM, SINGLE OR UNSPECIFIED FETUS: Primary | ICD-10-CM

## 2021-05-12 DIAGNOSIS — O36.5990 POOR FETAL GROWTH AFFECTING MANAGEMENT OF MOTHER, ANTEPARTUM, SINGLE OR UNSPECIFIED FETUS: ICD-10-CM

## 2021-05-12 PROCEDURE — 76820 UMBILICAL ARTERY ECHO: CPT | Mod: 26 | Performed by: OBSTETRICS & GYNECOLOGY

## 2021-05-12 PROCEDURE — 59025 FETAL NON-STRESS TEST: CPT

## 2021-05-12 PROCEDURE — 76816 OB US FOLLOW-UP PER FETUS: CPT | Mod: 26 | Performed by: OBSTETRICS & GYNECOLOGY

## 2021-05-12 PROCEDURE — 76816 OB US FOLLOW-UP PER FETUS: CPT

## 2021-05-12 NOTE — PROGRESS NOTES
"Please see \"Imaging\" tab under \"Chart Review\" for details of today's US at the [facility].    Mj Colvin MD  Maternal-Fetal Medicine    "

## 2021-05-18 ENCOUNTER — PRENATAL OFFICE VISIT (OUTPATIENT)
Dept: OBGYN | Facility: CLINIC | Age: 26
End: 2021-05-18
Payer: COMMERCIAL

## 2021-05-18 VITALS
WEIGHT: 135.9 LBS | SYSTOLIC BLOOD PRESSURE: 126 MMHG | BODY MASS INDEX: 21.93 KG/M2 | DIASTOLIC BLOOD PRESSURE: 78 MMHG | HEART RATE: 98 BPM

## 2021-05-18 DIAGNOSIS — O36.5930 POOR FETAL GROWTH AFFECTING MANAGEMENT OF MOTHER IN THIRD TRIMESTER, SINGLE OR UNSPECIFIED FETUS: Primary | ICD-10-CM

## 2021-05-18 PROCEDURE — 99207 PR PRENATAL VISIT: CPT | Performed by: OBSTETRICS & GYNECOLOGY

## 2021-05-18 NOTE — NURSING NOTE
"Chief Complaint   Patient presents with     Prenatal Care     34 weeks 4 days   MFM 2021        Initial /78 (BP Location: Left arm, Cuff Size: Adult Regular)   Pulse 98   Wt 61.6 kg (135 lb 14.4 oz)   LMP 2020   Breastfeeding No   BMI 21.93 kg/m   Estimated body mass index is 21.93 kg/m  as calculated from the following:    Height as of 10/23/19: 1.676 m (5' 6\").    Weight as of this encounter: 61.6 kg (135 lb 14.4 oz).  BP completed using cuff size: regular    Questioned patient about current smoking habits.  Pt. has never smoked.    34w4d      The following HM Due: NONE        +FM daily   +MFM following       Terri Jones, CMA on 2021 at 3:08 PM           "

## 2021-05-18 NOTE — PROGRESS NOTES
Doing well.   No complaints.   Denies VB, ctx, LOF. +FM  /78 (BP Location: Left arm, Cuff Size: Adult Regular)   Pulse 98   Wt 61.6 kg (135 lb 14.4 oz)   LMP 2020   Breastfeeding No   BMI 21.93 kg/m    General Appearance: NAD  Abdomen: Gravid, NT  Refer to flow sheet above.   A/P: 25 year old  at 34w4d  -- IUGR: ongoing  ultrasound surveillance with MFM  -- PTL precautions reviewed  RTC in 2 weeks for GBS, cervix check    Julian Rodriguez MD  [facility]

## 2021-06-01 ENCOUNTER — PRENATAL OFFICE VISIT (OUTPATIENT)
Dept: OBGYN | Facility: CLINIC | Age: 26
End: 2021-06-01
Payer: COMMERCIAL

## 2021-06-01 VITALS
HEART RATE: 94 BPM | DIASTOLIC BLOOD PRESSURE: 70 MMHG | BODY MASS INDEX: 22.95 KG/M2 | SYSTOLIC BLOOD PRESSURE: 102 MMHG | WEIGHT: 142.2 LBS

## 2021-06-01 DIAGNOSIS — Z34.83 ENCOUNTER FOR SUPERVISION OF OTHER NORMAL PREGNANCY IN THIRD TRIMESTER: Primary | ICD-10-CM

## 2021-06-01 DIAGNOSIS — O36.5930 POOR FETAL GROWTH AFFECTING MANAGEMENT OF MOTHER IN THIRD TRIMESTER, SINGLE OR UNSPECIFIED FETUS: ICD-10-CM

## 2021-06-01 PROCEDURE — 99207 PR PRENATAL VISIT: CPT | Performed by: OBSTETRICS & GYNECOLOGY

## 2021-06-01 PROCEDURE — 87653 STREP B DNA AMP PROBE: CPT | Performed by: OBSTETRICS & GYNECOLOGY

## 2021-06-01 NOTE — NURSING NOTE
"Chief Complaint   Patient presents with     Prenatal Care     36 weeks 4 days   GBS=DUE        Initial /70 (BP Location: Left arm, Cuff Size: Adult Regular)   Pulse 94   Wt 64.5 kg (142 lb 3.2 oz)   LMP 2020   Breastfeeding No   BMI 22.95 kg/m   Estimated body mass index is 22.95 kg/m  as calculated from the following:    Height as of 10/23/19: 1.676 m (5' 6\").    Weight as of this encounter: 64.5 kg (142 lb 3.2 oz).  BP completed using cuff size: regular    Questioned patient about current smoking habits.  Pt. has never smoked.    36w4d      The following HM Due: NONE      +FM daily  +Vaccine questions   +GBS due today       Terri Jones, ADDI on 2021 at 2:03 PM     "

## 2021-06-01 NOTE — PROGRESS NOTES
Doing well.   No complaints.   Denies VB, ctx, LOF. +FM  /70 (BP Location: Left arm, Cuff Size: Adult Regular)   Pulse 94   Wt 64.5 kg (142 lb 3.2 oz)   LMP 2020   Breastfeeding No   BMI 22.95 kg/m    General Appearance: NAD  Abdomen: Gravid, NT  Refer to flow sheet above.   A/P: 25 year old  at 36w4d  -- GBS collected today  -- IUGR: ongoing  ultrasound surveillance with MFM  -- labor precautions reviewed  RTC in 1 week     Julian Rodriguez MD  [facility]

## 2021-06-02 ENCOUNTER — OFFICE VISIT (OUTPATIENT)
Dept: MATERNAL FETAL MEDICINE | Facility: CLINIC | Age: 26
End: 2021-06-02
Attending: OBSTETRICS & GYNECOLOGY
Payer: COMMERCIAL

## 2021-06-02 ENCOUNTER — HOSPITAL ENCOUNTER (OUTPATIENT)
Dept: ULTRASOUND IMAGING | Facility: CLINIC | Age: 26
End: 2021-06-02
Attending: OBSTETRICS & GYNECOLOGY
Payer: COMMERCIAL

## 2021-06-02 DIAGNOSIS — O36.5990 POOR FETAL GROWTH AFFECTING MANAGEMENT OF MOTHER, ANTEPARTUM, SINGLE OR UNSPECIFIED FETUS: ICD-10-CM

## 2021-06-02 LAB
GP B STREP DNA SPEC QL NAA+PROBE: NEGATIVE
SPECIMEN SOURCE: NORMAL

## 2021-06-02 PROCEDURE — 76816 OB US FOLLOW-UP PER FETUS: CPT

## 2021-06-02 PROCEDURE — 76816 OB US FOLLOW-UP PER FETUS: CPT | Mod: 26 | Performed by: OBSTETRICS & GYNECOLOGY

## 2021-06-02 PROCEDURE — 59025 FETAL NON-STRESS TEST: CPT

## 2021-06-02 PROCEDURE — 59025 FETAL NON-STRESS TEST: CPT | Mod: 26 | Performed by: OBSTETRICS & GYNECOLOGY

## 2021-06-02 NOTE — PROGRESS NOTES
Please see full imaging report from ViewPoint program under imaging tab.      Dioni Mcwilliams MD  Maternal Fetal Medicine

## 2021-06-02 NOTE — NURSING NOTE
NST Performed due to FGR.   reviewed efm tracing. See NST/BPP Doc Flowsheet tab.    Ipad interpreters used for visit ID# [id number] and then reconnected for remainder of visit with ID#[id number].

## 2021-06-07 ENCOUNTER — PRENATAL OFFICE VISIT (OUTPATIENT)
Dept: OBGYN | Facility: CLINIC | Age: 26
End: 2021-06-07
Payer: COMMERCIAL

## 2021-06-07 VITALS
HEART RATE: 102 BPM | SYSTOLIC BLOOD PRESSURE: 120 MMHG | DIASTOLIC BLOOD PRESSURE: 64 MMHG | BODY MASS INDEX: 23.23 KG/M2 | WEIGHT: 143.9 LBS

## 2021-06-07 DIAGNOSIS — Z34.83 ENCOUNTER FOR SUPERVISION OF OTHER NORMAL PREGNANCY IN THIRD TRIMESTER: Primary | ICD-10-CM

## 2021-06-07 PROCEDURE — 99207 PR PRENATAL VISIT: CPT | Performed by: OBSTETRICS & GYNECOLOGY

## 2021-06-07 NOTE — PROGRESS NOTES
Doing well.   No complaints.   Denies VB, ctx, LOF. +FM  /64 (BP Location: Right arm, Cuff Size: Adult Regular)   Pulse 102   Wt 65.3 kg (143 lb 14.4 oz)   LMP 2020   Breastfeeding No   BMI 23.23 kg/m    General Appearance: NAD  Abdomen: Gravid, NT  Refer to flow sheet above.   A/P: 25 year old  at 37w3d  -- IUGR has been resolved and per MFM they recommend no more follow-up ultrasound and proceed with normal care  -- reviewed GBS negative status  -- labor precautions reviewed  RTC in 1 week    Julian Rodriguez MD  [facility]

## 2021-06-07 NOTE — NURSING NOTE
"Chief Complaint   Patient presents with     Prenatal Care     37 weeks 3 days   GBS=NEG       Initial /64 (BP Location: Right arm, Cuff Size: Adult Regular)   Pulse 102   Wt 65.3 kg (143 lb 14.4 oz)   LMP 2020   Breastfeeding No   BMI 23.23 kg/m   Estimated body mass index is 23.23 kg/m  as calculated from the following:    Height as of 10/23/19: 1.676 m (5' 6\").    Weight as of this encounter: 65.3 kg (143 lb 14.4 oz).  BP completed using cuff size: regular    Questioned patient about current smoking habits.  Pt. has never smoked.    37w3d      The following HM Due: NONE    +FM Daily   +NO contractions       Terri Jones, CMA on 2021 at 2:01 PM             "

## 2021-06-15 ENCOUNTER — PRENATAL OFFICE VISIT (OUTPATIENT)
Dept: OBGYN | Facility: CLINIC | Age: 26
End: 2021-06-15
Payer: COMMERCIAL

## 2021-06-15 VITALS — HEART RATE: 92 BPM | DIASTOLIC BLOOD PRESSURE: 74 MMHG | SYSTOLIC BLOOD PRESSURE: 120 MMHG

## 2021-06-15 DIAGNOSIS — Z34.83 ENCOUNTER FOR SUPERVISION OF OTHER NORMAL PREGNANCY IN THIRD TRIMESTER: Primary | ICD-10-CM

## 2021-06-15 PROCEDURE — 99207 PR PRENATAL VISIT: CPT | Performed by: OBSTETRICS & GYNECOLOGY

## 2021-06-15 NOTE — PROGRESS NOTES
Doing well.   No complaints.   Denies VB, ctx, LOF. +FM  /74   Pulse 92   LMP 2020   Breastfeeding No   General Appearance: NAD  Abdomen: Gravid, NT  Refer to flow sheet above.   A/P: 25 year old  at 38w4d  -- labor precautions reviewed  RTC in 1 week     Julian Rodriguez MD  [facility]

## 2021-06-15 NOTE — NURSING NOTE
"Chief Complaint   Patient presents with     Prenatal Care     38 weeks 4 days   GBS=NEG       Initial /74   Pulse 92   LMP 2020   Breastfeeding No  Estimated body mass index is 23.23 kg/m  as calculated from the following:    Height as of 10/23/19: 1.676 m (5' 6\").    Weight as of 21: 65.3 kg (143 lb 14.4 oz).  BP completed using cuff size: regular    Questioned patient about current smoking habits.  Pt. has never smoked.    38w4d      The following HM Due: NONE        +FM daily   No concerns       Terri Jones, CMA on 6/15/2021 at 2:43 PM           "

## 2021-06-22 ENCOUNTER — PRENATAL OFFICE VISIT (OUTPATIENT)
Dept: OBGYN | Facility: CLINIC | Age: 26
End: 2021-06-22
Payer: COMMERCIAL

## 2021-06-22 VITALS
BODY MASS INDEX: 23.36 KG/M2 | HEART RATE: 92 BPM | WEIGHT: 144.7 LBS | DIASTOLIC BLOOD PRESSURE: 78 MMHG | SYSTOLIC BLOOD PRESSURE: 122 MMHG

## 2021-06-22 DIAGNOSIS — Z34.83 ENCOUNTER FOR SUPERVISION OF OTHER NORMAL PREGNANCY IN THIRD TRIMESTER: Primary | ICD-10-CM

## 2021-06-22 PROCEDURE — 99207 PR PRENATAL VISIT: CPT | Performed by: OBSTETRICS & GYNECOLOGY

## 2021-06-22 NOTE — PROGRESS NOTES
Doing well.   No complaints.   Denies VB, ctx, LOF. +FM  /78 (BP Location: Left arm, Cuff Size: Adult Regular)   Pulse 92   Wt 65.6 kg (144 lb 11.2 oz)   LMP 2020   Breastfeeding No   BMI 23.36 kg/m    General Appearance: NAD  Abdomen: Gravid, NT  Refer to flow sheet above.   A/P: 25 year old  at 39w4d  -- will scheduled induction of labor today for postdates induction on  at 41 weeks  -- labor precautions reviewed  RTC in 1 week     Julian Rodriguez MD  [facility]

## 2021-06-22 NOTE — NURSING NOTE
"Chief Complaint   Patient presents with     Prenatal Care     39 weeks 4 days  GBS=NEG       Initial /78 (BP Location: Left arm, Cuff Size: Adult Regular)   Pulse 92   Wt 65.6 kg (144 lb 11.2 oz)   LMP 2020   Breastfeeding No   BMI 23.36 kg/m   Estimated body mass index is 23.36 kg/m  as calculated from the following:    Height as of 10/23/19: 1.676 m (5' 6\").    Weight as of this encounter: 65.6 kg (144 lb 11.2 oz).  BP completed using cuff size: regular    Questioned patient about current smoking habits.  Pt. has never smoked.    39w4d      The following HM Due: NONE    FM daily       Terri Jones, CMA on 2021 at 3:48 PM               "

## 2021-06-29 ENCOUNTER — PRENATAL OFFICE VISIT (OUTPATIENT)
Dept: OBGYN | Facility: CLINIC | Age: 26
End: 2021-06-29
Payer: COMMERCIAL

## 2021-06-29 VITALS
DIASTOLIC BLOOD PRESSURE: 70 MMHG | WEIGHT: 147 LBS | HEART RATE: 94 BPM | BODY MASS INDEX: 23.73 KG/M2 | SYSTOLIC BLOOD PRESSURE: 118 MMHG

## 2021-06-29 DIAGNOSIS — Z34.83 ENCOUNTER FOR SUPERVISION OF OTHER NORMAL PREGNANCY IN THIRD TRIMESTER: ICD-10-CM

## 2021-06-29 DIAGNOSIS — Z34.83 ENCOUNTER FOR SUPERVISION OF OTHER NORMAL PREGNANCY IN THIRD TRIMESTER: Primary | ICD-10-CM

## 2021-06-29 LAB
LABORATORY COMMENT REPORT: NORMAL
SARS-COV-2 RNA RESP QL NAA+PROBE: NEGATIVE
SARS-COV-2 RNA RESP QL NAA+PROBE: NORMAL
SPECIMEN SOURCE: NORMAL
SPECIMEN SOURCE: NORMAL

## 2021-06-29 PROCEDURE — U0005 INFEC AGEN DETEC AMPLI PROBE: HCPCS | Performed by: OBSTETRICS & GYNECOLOGY

## 2021-06-29 PROCEDURE — U0003 INFECTIOUS AGENT DETECTION BY NUCLEIC ACID (DNA OR RNA); SEVERE ACUTE RESPIRATORY SYNDROME CORONAVIRUS 2 (SARS-COV-2) (CORONAVIRUS DISEASE [COVID-19]), AMPLIFIED PROBE TECHNIQUE, MAKING USE OF HIGH THROUGHPUT TECHNOLOGIES AS DESCRIBED BY CMS-2020-01-R: HCPCS | Performed by: OBSTETRICS & GYNECOLOGY

## 2021-06-29 PROCEDURE — 99207 PR PRENATAL VISIT: CPT | Performed by: OBSTETRICS & GYNECOLOGY

## 2021-06-29 PROCEDURE — 59426 ANTEPARTUM CARE ONLY: CPT | Performed by: OBSTETRICS & GYNECOLOGY

## 2021-06-29 NOTE — NURSING NOTE
"Chief Complaint   Patient presents with     Prenatal Care     40 weeks 4 days, no c/o VB, LoF, cramping/contractions. Feeling FM daily. Patient is scheduled for induction on 21.       Initial /70   Pulse 94   Wt 66.7 kg (147 lb)   LMP 2020   BMI 23.73 kg/m   Estimated body mass index is 23.73 kg/m  as calculated from the following:    Height as of 10/23/19: 1.676 m (5' 6\").    Weight as of this encounter: 66.7 kg (147 lb).  BP completed using cuff size: regular    Questioned patient about current smoking habits.  Pt. has never smoked.          The following HM Due: NONE      Jeanie Amaral CMA               "

## 2021-07-01 NOTE — PROGRESS NOTES
Doing well.   No complaints. Patient inquired if she could await spontaneous labor past 41 week gestation.   Denies VB, ctx, LOF. +FM  /70   Pulse 94   Wt 66.7 kg (147 lb)   LMP 2020   BMI 23.73 kg/m    General Appearance: NAD  Abdomen: Gravid, NT   Refer to flow sheet above.   A/P: 25 year old  at 40w6d  -- inquiry above addressed; recommended she undergo induction of labor at 41w given postdates gestation and previous IUGR diagnosis that has resolved  -- labor precautions reviewed    Julian Rodriguez MD  [facility]

## 2021-07-02 ENCOUNTER — HOSPITAL ENCOUNTER (INPATIENT)
Facility: CLINIC | Age: 26
LOS: 2 days | Discharge: HOME | End: 2021-07-04
Attending: OBSTETRICS & GYNECOLOGY | Admitting: OBSTETRICS & GYNECOLOGY
Payer: COMMERCIAL

## 2021-07-02 LAB
ABO + RH BLD: NORMAL
ABO + RH BLD: NORMAL
BLD GP AB SCN SERPL QL: NORMAL
BLOOD BANK CMNT PATIENT-IMP: NORMAL
HGB BLD-MCNC: 11.7 G/DL (ref 11.7–15.7)
SPECIMEN EXP DATE BLD: NORMAL
T PALLIDUM AB SER QL: NONREACTIVE

## 2021-07-02 PROCEDURE — 120N000001 HC R&B MED SURG/OB

## 2021-07-02 PROCEDURE — 85018 HEMOGLOBIN: CPT | Performed by: OBSTETRICS & GYNECOLOGY

## 2021-07-02 PROCEDURE — 250N000011 HC RX IP 250 OP 636: Performed by: OBSTETRICS & GYNECOLOGY

## 2021-07-02 PROCEDURE — 86901 BLOOD TYPING SEROLOGIC RH(D): CPT | Performed by: OBSTETRICS & GYNECOLOGY

## 2021-07-02 PROCEDURE — 3E033VJ INTRODUCTION OF OTHER HORMONE INTO PERIPHERAL VEIN, PERCUTANEOUS APPROACH: ICD-10-PCS | Performed by: OBSTETRICS & GYNECOLOGY

## 2021-07-02 PROCEDURE — 36415 COLL VENOUS BLD VENIPUNCTURE: CPT | Performed by: OBSTETRICS & GYNECOLOGY

## 2021-07-02 PROCEDURE — 86780 TREPONEMA PALLIDUM: CPT | Performed by: OBSTETRICS & GYNECOLOGY

## 2021-07-02 PROCEDURE — 722N000001 HC LABOR CARE VAGINAL DELIVERY SINGLE

## 2021-07-02 PROCEDURE — 86900 BLOOD TYPING SEROLOGIC ABO: CPT | Performed by: OBSTETRICS & GYNECOLOGY

## 2021-07-02 PROCEDURE — 86850 RBC ANTIBODY SCREEN: CPT | Performed by: OBSTETRICS & GYNECOLOGY

## 2021-07-02 PROCEDURE — 250N000009 HC RX 250: Performed by: OBSTETRICS & GYNECOLOGY

## 2021-07-02 PROCEDURE — 99231 SBSQ HOSP IP/OBS SF/LOW 25: CPT | Performed by: OBSTETRICS & GYNECOLOGY

## 2021-07-02 PROCEDURE — 250N000013 HC RX MED GY IP 250 OP 250 PS 637: Performed by: OBSTETRICS & GYNECOLOGY

## 2021-07-02 PROCEDURE — 0KQM0ZZ REPAIR PERINEUM MUSCLE, OPEN APPROACH: ICD-10-PCS | Performed by: OBSTETRICS & GYNECOLOGY

## 2021-07-02 PROCEDURE — 258N000003 HC RX IP 258 OP 636: Performed by: OBSTETRICS & GYNECOLOGY

## 2021-07-02 RX ORDER — ACETAMINOPHEN 325 MG/1
650 TABLET ORAL EVERY 4 HOURS PRN
Status: DISCONTINUED | OUTPATIENT
Start: 2021-07-02 | End: 2021-07-02

## 2021-07-02 RX ORDER — HYDROCORTISONE 2.5 %
CREAM (GRAM) TOPICAL 3 TIMES DAILY PRN
Status: DISCONTINUED | OUTPATIENT
Start: 2021-07-02 | End: 2021-07-04 | Stop reason: HOSPADM

## 2021-07-02 RX ORDER — NALOXONE HYDROCHLORIDE 0.4 MG/ML
0.4 INJECTION, SOLUTION INTRAMUSCULAR; INTRAVENOUS; SUBCUTANEOUS
Status: DISCONTINUED | OUTPATIENT
Start: 2021-07-02 | End: 2021-07-02

## 2021-07-02 RX ORDER — OXYTOCIN/0.9 % SODIUM CHLORIDE 30/500 ML
100-340 PLASTIC BAG, INJECTION (ML) INTRAVENOUS CONTINUOUS PRN
Status: DISCONTINUED | OUTPATIENT
Start: 2021-07-02 | End: 2021-07-02

## 2021-07-02 RX ORDER — AMOXICILLIN 250 MG
2 CAPSULE ORAL 2 TIMES DAILY
Status: DISCONTINUED | OUTPATIENT
Start: 2021-07-02 | End: 2021-07-04 | Stop reason: HOSPADM

## 2021-07-02 RX ORDER — OXYCODONE AND ACETAMINOPHEN 5; 325 MG/1; MG/1
1 TABLET ORAL
Status: DISCONTINUED | OUTPATIENT
Start: 2021-07-02 | End: 2021-07-02

## 2021-07-02 RX ORDER — ONDANSETRON 2 MG/ML
4 INJECTION INTRAMUSCULAR; INTRAVENOUS EVERY 6 HOURS PRN
Status: DISCONTINUED | OUTPATIENT
Start: 2021-07-02 | End: 2021-07-02

## 2021-07-02 RX ORDER — NALOXONE HYDROCHLORIDE 0.4 MG/ML
0.2 INJECTION, SOLUTION INTRAMUSCULAR; INTRAVENOUS; SUBCUTANEOUS
Status: DISCONTINUED | OUTPATIENT
Start: 2021-07-02 | End: 2021-07-02

## 2021-07-02 RX ORDER — METHYLERGONOVINE MALEATE 0.2 MG/ML
200 INJECTION INTRAVENOUS
Status: DISCONTINUED | OUTPATIENT
Start: 2021-07-02 | End: 2021-07-02

## 2021-07-02 RX ORDER — NALBUPHINE HYDROCHLORIDE 10 MG/ML
2.5-5 INJECTION, SOLUTION INTRAMUSCULAR; INTRAVENOUS; SUBCUTANEOUS EVERY 6 HOURS PRN
Status: DISCONTINUED | OUTPATIENT
Start: 2021-07-02 | End: 2021-07-02

## 2021-07-02 RX ORDER — TRANEXAMIC ACID 10 MG/ML
1 INJECTION, SOLUTION INTRAVENOUS EVERY 30 MIN PRN
Status: DISCONTINUED | OUTPATIENT
Start: 2021-07-02 | End: 2021-07-02

## 2021-07-02 RX ORDER — AMOXICILLIN 250 MG
1 CAPSULE ORAL 2 TIMES DAILY
Status: DISCONTINUED | OUTPATIENT
Start: 2021-07-02 | End: 2021-07-04 | Stop reason: HOSPADM

## 2021-07-02 RX ORDER — EPHEDRINE SULFATE 50 MG/ML
5 INJECTION, SOLUTION INTRAMUSCULAR; INTRAVENOUS; SUBCUTANEOUS
Status: DISCONTINUED | OUTPATIENT
Start: 2021-07-02 | End: 2021-07-02

## 2021-07-02 RX ORDER — MODIFIED LANOLIN
OINTMENT (GRAM) TOPICAL
Status: DISCONTINUED | OUTPATIENT
Start: 2021-07-02 | End: 2021-07-04 | Stop reason: HOSPADM

## 2021-07-02 RX ORDER — IBUPROFEN 800 MG/1
800 TABLET, FILM COATED ORAL EVERY 6 HOURS PRN
Status: DISCONTINUED | OUTPATIENT
Start: 2021-07-02 | End: 2021-07-04 | Stop reason: HOSPADM

## 2021-07-02 RX ORDER — TRANEXAMIC ACID 10 MG/ML
1 INJECTION, SOLUTION INTRAVENOUS EVERY 30 MIN PRN
Status: DISCONTINUED | OUTPATIENT
Start: 2021-07-02 | End: 2021-07-04 | Stop reason: HOSPADM

## 2021-07-02 RX ORDER — CARBOPROST TROMETHAMINE 250 UG/ML
250 INJECTION, SOLUTION INTRAMUSCULAR
Status: DISCONTINUED | OUTPATIENT
Start: 2021-07-02 | End: 2021-07-02

## 2021-07-02 RX ORDER — OXYTOCIN 10 [USP'U]/ML
10 INJECTION, SOLUTION INTRAMUSCULAR; INTRAVENOUS
Status: DISCONTINUED | OUTPATIENT
Start: 2021-07-02 | End: 2021-07-04 | Stop reason: HOSPADM

## 2021-07-02 RX ORDER — OXYTOCIN 10 [USP'U]/ML
10 INJECTION, SOLUTION INTRAMUSCULAR; INTRAVENOUS
Status: DISCONTINUED | OUTPATIENT
Start: 2021-07-02 | End: 2021-07-02

## 2021-07-02 RX ORDER — OXYTOCIN/0.9 % SODIUM CHLORIDE 30/500 ML
1-24 PLASTIC BAG, INJECTION (ML) INTRAVENOUS CONTINUOUS
Status: DISCONTINUED | OUTPATIENT
Start: 2021-07-02 | End: 2021-07-02

## 2021-07-02 RX ORDER — FENTANYL CITRATE 50 UG/ML
50-100 INJECTION, SOLUTION INTRAMUSCULAR; INTRAVENOUS
Status: DISCONTINUED | OUTPATIENT
Start: 2021-07-02 | End: 2021-07-02

## 2021-07-02 RX ORDER — OXYTOCIN/0.9 % SODIUM CHLORIDE 30/500 ML
340 PLASTIC BAG, INJECTION (ML) INTRAVENOUS CONTINUOUS PRN
Status: DISCONTINUED | OUTPATIENT
Start: 2021-07-02 | End: 2021-07-04 | Stop reason: HOSPADM

## 2021-07-02 RX ORDER — IBUPROFEN 800 MG/1
800 TABLET, FILM COATED ORAL
Status: COMPLETED | OUTPATIENT
Start: 2021-07-02 | End: 2021-07-02

## 2021-07-02 RX ORDER — OXYTOCIN/0.9 % SODIUM CHLORIDE 30/500 ML
100 PLASTIC BAG, INJECTION (ML) INTRAVENOUS CONTINUOUS
Status: DISCONTINUED | OUTPATIENT
Start: 2021-07-02 | End: 2021-07-04 | Stop reason: HOSPADM

## 2021-07-02 RX ORDER — ACETAMINOPHEN 325 MG/1
650 TABLET ORAL EVERY 4 HOURS PRN
Status: DISCONTINUED | OUTPATIENT
Start: 2021-07-02 | End: 2021-07-04 | Stop reason: HOSPADM

## 2021-07-02 RX ORDER — BISACODYL 10 MG
10 SUPPOSITORY, RECTAL RECTAL DAILY PRN
Status: DISCONTINUED | OUTPATIENT
Start: 2021-07-04 | End: 2021-07-04 | Stop reason: HOSPADM

## 2021-07-02 RX ORDER — SODIUM CHLORIDE, SODIUM LACTATE, POTASSIUM CHLORIDE, CALCIUM CHLORIDE 600; 310; 30; 20 MG/100ML; MG/100ML; MG/100ML; MG/100ML
INJECTION, SOLUTION INTRAVENOUS CONTINUOUS
Status: DISCONTINUED | OUTPATIENT
Start: 2021-07-02 | End: 2021-07-02

## 2021-07-02 RX ADMIN — Medication 2 MILLI-UNITS/MIN: at 13:33

## 2021-07-02 RX ADMIN — LIDOCAINE HYDROCHLORIDE 5 ML: 10 INJECTION, SOLUTION EPIDURAL; INFILTRATION; INTRACAUDAL; PERINEURAL at 16:53

## 2021-07-02 RX ADMIN — IBUPROFEN 800 MG: 800 TABLET, FILM COATED ORAL at 16:52

## 2021-07-02 RX ADMIN — SODIUM CHLORIDE, POTASSIUM CHLORIDE, SODIUM LACTATE AND CALCIUM CHLORIDE: 600; 310; 30; 20 INJECTION, SOLUTION INTRAVENOUS at 13:33

## 2021-07-02 RX ADMIN — DOCUSATE SODIUM AND SENNOSIDES 1 TABLET: 8.6; 5 TABLET, FILM COATED ORAL at 20:10

## 2021-07-02 RX ADMIN — FENTANYL CITRATE 100 MCG: 50 INJECTION, SOLUTION INTRAMUSCULAR; INTRAVENOUS at 15:37

## 2021-07-02 ASSESSMENT — ACTIVITIES OF DAILY LIVING (ADL)
FALL_HISTORY_WITHIN_LAST_SIX_MONTHS: NO
TOILETING_ISSUES: NO

## 2021-07-02 NOTE — PROVIDER NOTIFICATION
07/02/21 1624   Provider Notification   Provider Name/Title jayy   Method of Notification At Bedside   Request Attend Delivery   In house MD present at bedside to attend delivery per RN request.

## 2021-07-02 NOTE — PLAN OF CARE
Data: Olive Lima transferred to [facility] via wheelchair at 1830. Baby transferred via parent's arms.  Action: Receiving unit notified of transfer: Yes. Patient and family notified of room change. Report given to MIKE Preston at 1850. Belongings sent to receiving unit. Accompanied by Registered Nurse. Oriented patient to surroundings. Call light within reach. ID bands double-checked with receiving RN.  Response: Patient tolerated transfer and is stable.

## 2021-07-02 NOTE — PROVIDER NOTIFICATION
07/02/21 5835   Provider Notification   Provider Name/Title Dr. Garcia   Method of Notification Phone   Updated MD that pt just received a dose of IV Fentanyl for increasing discomfort. Pt plans an epidural. T's cat I tracing. Pt on 6 of Pit.     MD states he will come to the hospital to AROM soon.

## 2021-07-02 NOTE — PLAN OF CARE
Data: [facility]: 2021 11:43 AM.  Patient admitted for induction for postdates. Patient is a .  Prenatal record reviewed. Pregnancy has been uncomplicated.  Gestational Age 41w0d. VSS. Fetal movement active. Patient denies uterine contractions, leaking of vaginal fluid/rupture of membranes, vaginal bleeding, abdominal pain, nausea, vomiting, headache, visual disturbances, epigastric or URQ pain, significant edema. Support person is present.   Action: Verbal consent for EFM.  Admission assessment completed. Bill of rights reviewed.  Response: Patient verbalized agreement with plan. Will contact Dr Julian Rodriguez with update and further orders.

## 2021-07-02 NOTE — PROVIDER NOTIFICATION
21 1330   Provider Notification   Provider Name/Title Dr. Rodriguez   Method of Notification Phone   Updated MD on arrival of induction () at 41w0d. GBS neg. FHT's cat I tracing. Irregular contractions on monitor.     MD orders for intrapartum admission. Orders to start IV Pitocin per protocol. Will plan AROM at later time. Pt and SO agreeable with plan going forward.

## 2021-07-02 NOTE — H&P
L&D History and Physical   2021  Olive Lima  [id number]      HPI: Olive Lima is a 25 year old  at 41w0d by 8w4d US, here postdates induction of labor.     She states that she is feeling well today.  She denies fever, HA, blurred vision, Nausea, vomiting, CP, SOB, abdominal pain, constipation, diarrhea, vaginal bleeding, LOF, abnormal vaginal discharge, and acute swelling.  +FM.      Complications of Pregnancy:  Patient Active Problem List   Diagnosis Code     Postpartum state Z39.2     Nausea and vomiting of pregnancy, antepartum O21.9     Labor and delivery, indication for care O75.9       OBHX:   OB History    Para Term  AB Living   2 1 1 0 0 1   SAB TAB Ectopic Multiple Live Births   0 0 0 0 1      # Outcome Date GA Lbr [place]/2nd Weight Sex Delivery Anes PTL Lv   2 Current            1 Term 19 40w1d 04:00 / 01:42 3.22 kg (7 lb 1.6 oz) F Vag-Spont EPI N DYLAN      Name: Christelle      Apgar1: 8  Apgar5: 6       MedicalHX:   Past Medical History:   Diagnosis Date     Hyperemesis of pregnancy        SurgicalHX:   Past Surgical History:   Procedure Laterality Date     NO HISTORY OF SURGERY         Medications:   No current facility-administered medications on file prior to encounter.   acetaminophen (TYLENOL) 500 MG tablet, Take 1-2 tablets (500-1,000 mg) by mouth every 6 hours as needed for mild pain  Prenatal Vit-Fe Fumarate-FA (PRENATAL MULTIVITAMIN W/IRON) 27-0.8 MG tablet, Take 1 tablet by mouth daily        Allergies:  No Known Allergies    FamilyHX:  History reviewed. No pertinent family history.    SocialHX:   Social History     Socioeconomic History     Marital status: Single     Spouse name: Wm Alfred     Number of children: 1     Years of education: None     Highest education level: Some college, no degree   Occupational History     None   Social Needs     Financial resource strain: Not hard at all     Food insecurity     Worry: Never true     Inability: Never  true     Transportation needs     Medical: No     Non-medical: No   Tobacco Use     Smoking status: Never Smoker     Smokeless tobacco: Never Used   Substance and Sexual Activity     Alcohol use: Never     Frequency: Never     Binge frequency: Never     Drug use: Never     Sexual activity: Yes     Partners: Male   Lifestyle     Physical activity     Days per week: None     Minutes per session: None     Stress: None   Relationships     Social connections     Talks on phone: None     Gets together: None     Attends Judaism service: None     Active member of club or organization: None     Attends meetings of clubs or organizations: None     Relationship status: None     Intimate partner violence     Fear of current or ex partner: None     Emotionally abused: None     Physically abused: None     Forced sexual activity: None   Other Topics Concern     None   Social History Narrative     None       ROS: 10-point ROS negative except as in HPI     Physical Exam:  Vitals:    07/02/21 1700 07/02/21 1715 07/02/21 1730 07/02/21 1745   BP: 114/65 105/58 106/61 105/58   Resp:       Temp:       TempSrc:         GEN: resting comfortably in bed, in NAD   CVS: RRR, no murmur appreciated   PULMONARY: CTAB, no increased work of breathing, no cough/wheeze   ABDOMEN: soft, gravid, non-tender, non-distended  EXTREMITIES: trace edema, non tender to palpation  CVX: 5-6/80/-1  Presentation: cephalic by cervical exam  EFW: SGA    NST:  FHT: baseline 125, moderate variability, accelerations present, no decelerations  TOCO: irregular contractions      Ultrasounds:  Reviewed in Epic    Labs:   Results for orders placed or performed during the hospital encounter of 07/02/21 (from the past 24 hour(s))   Treponema Abs w Reflex to RPR and Titer   Result Value Ref Range    Treponema Antibodies Nonreactive NR^Nonreactive   Hemoglobin   Result Value Ref Range    Hemoglobin 11.7 11.7 - 15.7 g/dL   ABO/Rh type and screen   Result Value Ref Range    ABO  B     RH(D) Pos     Antibody Screen Neg     Test Valid Only At [facility]        Specimen Expires 2021          Lab Results   Component Value Date    ABO B 2021    RH Pos 2021    AS Neg 2021    HEPBANG Nonreactive 2020    CHPCRT Negative 2019    GCPCRT Negative 2019    HGB 11.7 2021       GBS Status:   Lab Results   Component Value Date    GBS Negative 2021       Lab Results   Component Value Date    PAP NIL 2019       A/P: Olive Lima is a 25 year old female  at 41w0d by 8w4d US, here for postdates induction of labor    Admit for: Postdates induction of labor; obtain routine labs  FHT: Category 1 tracing   GBS status: Negative; IV antibiotics not indicated  Pain management: Per patient request; if epidural anesthesia, then will consult anesthesiologist for administration; appreciate coordination of care     Julian Rodriguez MD  [facility]

## 2021-07-02 NOTE — BRIEF OP NOTE
Delivery Summary:  1. IUP at 41+0/7 wks. Induction for post-dates.   2. GBS negative, RH positive.   3. Called to room for precipitous delivery.   4.  of female with truncal cord. Delayed clamp, thick meconium. NICU present.   5. Perineum with 2nd degree perineal lac. Repaired with 3-0 vicryl.   6. Dr. Barcenas for delivery  7. EBL of 150 ml.     Qian Barcenas MD

## 2021-07-03 PROBLEM — O21.9 NAUSEA AND VOMITING OF PREGNANCY, ANTEPARTUM: Status: RESOLVED | Noted: 2020-11-09 | Resolved: 2021-07-03

## 2021-07-03 LAB — HGB BLD-MCNC: 10.6 G/DL (ref 11.7–15.7)

## 2021-07-03 PROCEDURE — 120N000001 HC R&B MED SURG/OB

## 2021-07-03 PROCEDURE — 99231 SBSQ HOSP IP/OBS SF/LOW 25: CPT | Performed by: OBSTETRICS & GYNECOLOGY

## 2021-07-03 PROCEDURE — 85018 HEMOGLOBIN: CPT | Performed by: OBSTETRICS & GYNECOLOGY

## 2021-07-03 PROCEDURE — 250N000013 HC RX MED GY IP 250 OP 250 PS 637: Performed by: OBSTETRICS & GYNECOLOGY

## 2021-07-03 PROCEDURE — 36415 COLL VENOUS BLD VENIPUNCTURE: CPT | Performed by: OBSTETRICS & GYNECOLOGY

## 2021-07-03 PROCEDURE — 722N000001 HC LABOR CARE VAGINAL DELIVERY SINGLE

## 2021-07-03 RX ORDER — IBUPROFEN 600 MG/1
600 TABLET, FILM COATED ORAL EVERY 6 HOURS PRN
Qty: 30 TABLET | Refills: 0 | Status: SHIPPED | OUTPATIENT
Start: 2021-07-03 | End: 2022-07-08

## 2021-07-03 RX ADMIN — IBUPROFEN 800 MG: 800 TABLET, FILM COATED ORAL at 15:41

## 2021-07-03 RX ADMIN — IBUPROFEN 800 MG: 800 TABLET, FILM COATED ORAL at 21:42

## 2021-07-03 RX ADMIN — IBUPROFEN 800 MG: 800 TABLET, FILM COATED ORAL at 08:41

## 2021-07-03 RX ADMIN — DOCUSATE SODIUM AND SENNOSIDES 1 TABLET: 8.6; 5 TABLET, FILM COATED ORAL at 21:42

## 2021-07-03 RX ADMIN — DOCUSATE SODIUM AND SENNOSIDES 1 TABLET: 8.6; 5 TABLET, FILM COATED ORAL at 08:40

## 2021-07-03 RX ADMIN — IBUPROFEN 800 MG: 800 TABLET, FILM COATED ORAL at 00:17

## 2021-07-03 NOTE — L&D DELIVERY NOTE
Delivery Date: 2021    The patient is a 25-year-old G2, P1-0-0-1, at 40 and 0/7 weeks, who was being followed by [facility] group.  She was brought in for postdates induction and was about to receive her epidural.  She was apparently on the toilet and felt pressure.  She was then examined in her bed, had rupture of membranes with thick meconium and the vertex was showing at the perineum.  I was called as the in-house on-call physician.  Through 2 maternal contractions she did well and then at that point, there was fetal bradycardia to the 60s with no return to baseline despite positioning of the mother.  She has given verbal consent for vacuum-assisted vaginal delivery; however, with one maternal effort, she delivered a female, vertex over a second-degree perineal laceration with a truncal cord that was reduced and delivered through.  The remainder of the infant delivered atraumatically and the infant was placed on the maternal abdomen, where it had immediate squalling.  The NICU was present and felt no further assessment was needed and departed.     The cord was clamped by myself and cut by the father.  The placenta delivered spontaneously Rosales presentation, intact with a 3-vessel cord and the perineum was repaired with 3-0 Vicryl in the usual fashion.    Qian Barcenas MD        D: 2021   T: 2021   MT: LBMT1    Name:     CHACORTA CRENSHAW  MRN:      [id number]        Account:       [id number]   :      1995           Delivery Date: 2021     Document: [id number]

## 2021-07-03 NOTE — PLAN OF CARE
Vital signs within normal limits. Postpartum checks within normal limits - see flow record. Patient eating and drinking normally. Patient able to empty bladder independently and is up ambulating. No apparent signs of infection. S/L intact, Awaiting Hgb check this am. Patient performing self cares and is able to care for infant. Breastfeeding good. Tylenol and ice pack given for pain. Positive attachment behaviors observed with infant. Partner supportive at bedside.

## 2021-07-03 NOTE — DISCHARGE SUMMARY
[facility] Postpartum/Discharge Note    S:  Patient without complaints.  Minimal lochia.  Would like to go home today if baby okay to discharge.    O:  Blood pressure 119/54, pulse 88, temperature 98.4  F (36.9  C), temperature source Oral, resp. rate 16, last menstrual period 09/24/2020, unknown if currently breastfeeding.        Urine output adequate        Abdomen - Fundus firm, at umbilicus, nontender        Extremities - No calf tenderness    A:   Postpartum Day# 1, s/p Vaginal delivery - doing well    P:  1)  Discharge home if baby okay for discharge.  If baby has to stay, cancel discharge.        2)  F/U 6 weeks w/ Primary OB        3)  Discharge meds: Tracie Viveros MD

## 2021-07-03 NOTE — PLAN OF CARE
Meeting expected goals. Taking Ibuprofen for uterine cramping .   Pt will stay tonight  and will discharge tomorrow in the morning.

## 2021-07-03 NOTE — LACTATION NOTE
Lactation in to see patient. Dad interpreting. Mother states baby nursing well. Patient just made enough milk with her first. Discussed supply and demand. Importance of feeding frequently to bring in milk. No concerns at this time. Encouraged to call prn.

## 2021-07-03 NOTE — PLAN OF CARE
Pt meeting all expected goals today. Up ambulating in room. Voiding adequately. Discomfort controlled with PRN medications. Pt is breastfeeding her daughter. Positive bonding noted between parents and infant. SO at bedside, supportive and involved. Plan for early discharge this evening if possible. Discharge orders placed by OB.

## 2021-07-04 VITALS
TEMPERATURE: 98.1 F | SYSTOLIC BLOOD PRESSURE: 120 MMHG | DIASTOLIC BLOOD PRESSURE: 73 MMHG | RESPIRATION RATE: 16 BRPM | HEART RATE: 82 BPM

## 2021-07-04 PROCEDURE — 250N000013 HC RX MED GY IP 250 OP 250 PS 637: Performed by: OBSTETRICS & GYNECOLOGY

## 2021-07-04 PROCEDURE — 99238 HOSP IP/OBS DSCHRG MGMT 30/<: CPT | Performed by: OBSTETRICS & GYNECOLOGY

## 2021-07-04 RX ADMIN — IBUPROFEN 800 MG: 800 TABLET, FILM COATED ORAL at 09:24

## 2021-07-04 RX ADMIN — IBUPROFEN 800 MG: 800 TABLET, FILM COATED ORAL at 03:30

## 2021-07-04 RX ADMIN — DOCUSATE SODIUM AND SENNOSIDES 1 TABLET: 8.6; 5 TABLET, FILM COATED ORAL at 09:25

## 2021-07-04 NOTE — PROGRESS NOTES
[facility] Obstetrics Post-Partum Progress Note          Assessment and Plan:    Assessment:   Post-partum day #2  Induced vaginal delivery secondary to post-dates  L&D complications: None      Doing well.  No excessive bleeding  Pain well-controlled.      Plan:   Ambulation encouraged  Breast feeding strategies discussed  Monitor wound for signs of infection  Pain control measures as needed  Reportable signs and symptoms dicussed with the patient     Take your tempature twice daily for 3 days and call if > 100.4  Nothing in vagina for 6 wks  Continue taking prenatal MVI daily for 1 month    Discharge later today           Interval History:   Doing well.  Pain is well-controlled.  No fevers.  No history of foul-smelling vaginal discharge.  Good appetite.  Denies chest pain, shortness of breath, nausea or vomiting.  Vaginal bleeding is similar to a heavy menstrual flow.  Ambulatory.  Breastfeeding well.          Significant Problems:      Past Medical History:   Diagnosis Date     Hyperemesis of pregnancy      Vaginal delivery 7/2/2021             Review of Systems:    The patient denies any chest pain, shortness of breath, excessive pain, fever, chills, purulent drainage from the wound, nausea or vomiting.          Medications:     All medications related to the patient's surgery have been reviewed  Current Facility-Administered Medications   Medication     acetaminophen (TYLENOL) tablet 650 mg     bisacodyl (DULCOLAX) Suppository 10 mg     hydrocortisone 2.5 % cream     ibuprofen (ADVIL/MOTRIN) tablet 800 mg     lactated ringers BOLUS 1,000 mL     lanolin cream     No MMR Needed - Assessment: Patient does not need MMR vaccine     NO Rho (D) immune globulin (RhoGam) needed - mother Rh POSITIVE     No Tdap Needed - Assessment: Patient does not need Tdap vaccine     oxytocin (PITOCIN) 30 units in 500 mL 0.9% NaCl infusion     oxytocin (PITOCIN) 30 units in 500 mL 0.9% NaCl infusion     oxytocin  (PITOCIN) injection 10 Units     senna-docusate (SENOKOT-S/PERICOLACE) 8.6-50 MG per tablet 1 tablet    Or     senna-docusate (SENOKOT-S/PERICOLACE) 8.6-50 MG per tablet 2 tablet     sodium phosphate (FLEET ENEMA) 1 enema     tranexamic acid 1 g in 100 mL 0.7% NaCl IV bag (premix)             Physical Exam:   Vitals were reviewed  All vitals stable  Temp: 97.6  F (36.4  C) Temp src: Oral BP: 120/73 Pulse: 83   Resp: 16        Uterine fundus is firm, non-tender and at the level of the umbilicus          Data:     All laboratory data related to this surgery reviewed  Hemoglobin   Date Value Ref Range Status   07/03/2021 10.6 (L) 11.7 - 15.7 g/dL Final   07/02/2021 11.7 11.7 - 15.7 g/dL Final   04/06/2021 12.0 11.7 - 15.7 g/dL Final   11/14/2020 13.8 11.7 - 15.7 g/dL Final   09/08/2019 14.3 11.7 - 15.7 g/dL Final     No imaging studies have been ordered    Frantz Garcia MD

## 2021-07-04 NOTE — PLAN OF CARE
Data: Vital signs within normal limits. Postpartum checks within normal limits - see flow record. Patient eating and drinking normally. Patient able to empty bladder independently and is up ambulating. No apparent signs of infection. Repair healing well. Patient performing self cares and is able to care for infant.  Breastfeeding independently, lactation saw prior to discharge.  Action: Patient medicated during the shift for cramping. See MAR. Patient reassessed within 1 hour after each medication and pain was improved - patient stated she was comfortable. Education complete.  All discharge instructions reviewed with patient and significant other, pt verbalizes understanding.  Discharge medication given to pt and instructions reviewed.    Response: Positive attachment behaviors observed with infant. Significant other present and supportive.    Plan: Follow up with OB provider for 6 week check up, pt verbalizes understanding.  Discharged to home at 1310 with infant and accompanied by significant other.

## 2021-07-04 NOTE — DISCHARGE INSTRUCTIONS
Make an appointment with OB provider in 6 weeks for postpartum check up    Postpartum Vaginal Delivery Instructions    Activity       Ask family and friends for help when you need it.    Do not place anything in your vagina for 6 weeks.    You are not restricted on other activities, but take it easy for a few weeks to allow your body to recover from delivery.  You are able to do any activities you feel up to that point.    No driving until you have stopped taking your pain medications (usually two weeks after delivery).     Call your health care provider if you have any of these symptoms:       Increased pain, swelling, redness, or fluid around your stiches from an episiotomy or perineal tear.    A fever above 100.4 F (38 C) with or without chills when placing a thermometer under your tongue.    You soak a sanitary pad with blood within 1 hour, or you see blood clots larger than a golf ball.    Bleeding that lasts more than 6 weeks.    Vaginal discharge that smells bad.    Severe pain, cramping or tenderness in your lower belly area.    A need to urinate more frequently (use the toilet more often), more urgently (use the toilet very quickly), or it burns when you urinate.    Nausea and vomiting.    Redness, swelling or pain around a vein in your leg.    Problems breastfeeding or a red or painful area on your breast.    Chest pain and cough or are gasping for air.    Problems coping with sadness, anxiety, or depression.  If you have any concerns about hurting yourself or the baby, call your provider immediately.     You have questions or concerns after you return home.     Keep your hands clean:  Always wash your hands before touching your perineal area and stitches.  This helps reduce your risk of infection.  If your hands aren't dirty, you may use an alcohol hand-rub to clean your hands. Keep your nails clean and short.

## 2021-07-04 NOTE — DISCHARGE SUMMARY
[facility] Obstetrics Post-Partum Discharge Summary Note          Assessment and Plan:    Assessment:   Post-partum day #2  Induced vaginal delivery secondary to post-dates  L&D complications: None      Doing well.  No excessive bleeding  Pain well-controlled.      Plan:   Ambulation encouraged  Breast feeding strategies discussed  Monitor wound for signs of infection  Pain control measures as needed  Reportable signs and symptoms dicussed with the patient     Take your tempature twice daily for 3 days and call if > 100.4  Nothing in vagina for 6 wks  Continue taking prenatal MVI daily for 1 month    Discharge later today           Interval History:   Doing well.  Pain is well-controlled.  No fevers.  No history of foul-smelling vaginal discharge.  Good appetite.  Denies chest pain, shortness of breath, nausea or vomiting.  Vaginal bleeding is similar to a heavy menstrual flow.  Ambulatory.  Breastfeeding well.          Significant Problems:      Past Medical History:   Diagnosis Date     Hyperemesis of pregnancy      Vaginal delivery 7/2/2021             Review of Systems:    The patient denies any chest pain, shortness of breath, excessive pain, fever, chills, purulent drainage from the wound, nausea or vomiting.          Medications:     All medications related to the patient's surgery have been reviewed  Current Facility-Administered Medications   Medication     acetaminophen (TYLENOL) tablet 650 mg     bisacodyl (DULCOLAX) Suppository 10 mg     hydrocortisone 2.5 % cream     ibuprofen (ADVIL/MOTRIN) tablet 800 mg     lactated ringers BOLUS 1,000 mL     lanolin cream     No MMR Needed - Assessment: Patient does not need MMR vaccine     NO Rho (D) immune globulin (RhoGam) needed - mother Rh POSITIVE     No Tdap Needed - Assessment: Patient does not need Tdap vaccine     oxytocin (PITOCIN) 30 units in 500 mL 0.9% NaCl infusion     oxytocin (PITOCIN) 30 units in 500 mL 0.9% NaCl infusion      oxytocin (PITOCIN) injection 10 Units     senna-docusate (SENOKOT-S/PERICOLACE) 8.6-50 MG per tablet 1 tablet    Or     senna-docusate (SENOKOT-S/PERICOLACE) 8.6-50 MG per tablet 2 tablet     sodium phosphate (FLEET ENEMA) 1 enema     tranexamic acid 1 g in 100 mL 0.7% NaCl IV bag (premix)             Physical Exam:   Vitals were reviewed  All vitals stable  Temp: 97.6  F (36.4  C) Temp src: Oral BP: 120/73 Pulse: 83   Resp: 16        Uterine fundus is firm, non-tender and at the level of the umbilicus          Data:     All laboratory data related to this surgery reviewed  Hemoglobin   Date Value Ref Range Status   07/03/2021 10.6 (L) 11.7 - 15.7 g/dL Final   07/02/2021 11.7 11.7 - 15.7 g/dL Final   04/06/2021 12.0 11.7 - 15.7 g/dL Final   11/14/2020 13.8 11.7 - 15.7 g/dL Final   09/08/2019 14.3 11.7 - 15.7 g/dL Final     No imaging studies have been ordered    Frantz Garcia MD

## 2021-07-04 NOTE — PLAN OF CARE
Assumed care [id number]. Bonding well with baby. Ambulating independently, voiding without difficulty. Denies headache, dizziness, SOB, chest pain, and changes in vision. Ibuprofen for pain management. VSS. Feeding baby via breast and bottle; good breastfeeding observed.

## 2021-08-19 ENCOUNTER — PRENATAL OFFICE VISIT (OUTPATIENT)
Dept: OBGYN | Facility: CLINIC | Age: 26
End: 2021-08-19
Payer: COMMERCIAL

## 2021-08-19 VITALS
HEART RATE: 78 BPM | SYSTOLIC BLOOD PRESSURE: 122 MMHG | WEIGHT: 132.3 LBS | BODY MASS INDEX: 21.35 KG/M2 | DIASTOLIC BLOOD PRESSURE: 68 MMHG

## 2021-08-19 DIAGNOSIS — M54.50 BILATERAL LOW BACK PAIN WITHOUT SCIATICA, UNSPECIFIED CHRONICITY: ICD-10-CM

## 2021-08-19 DIAGNOSIS — R61 DIAPHORESIS: ICD-10-CM

## 2021-08-19 NOTE — PROGRESS NOTES
6 week Postpartum Visit Note    S:  Olive Lima is here for her 6-week postpartum checkup.   C/o of low back discomfort since the delivery. She did not have an epidural administered during her labor. Also, complains of excessive sweating since her delivery.       Delivery Date: 2021.    Delivering provider:  Qian Barcenas MD.    Type of delivery:  .  Perineum:  2nd degree perineal laceration, repaired.     Infant gender:  girl, weight 6 pounds 15 oz.  Feeding Method:  .  Complications reported with feeding:  none, infant thriving .    Bleeding:  Light.  Duration:  Since delivery .  Menses resumed:  No  Bowel/Urinary problems:  No      Contraception Planned:  Undecided at this time, will return for future appointment to discuss this  She  has not had intercourse since delivery..    Current tobacco use:  No  Hx of Abuse:  No  ================================================================  ROS: 10 point ROS neg other than the symptoms noted above in the HPI.     O:  EXAM:  /68 (BP Location: Left arm, Cuff Size: Adult Regular)   Pulse 78   Wt 60 kg (132 lb 4.8 oz)   LMP 2020   Breastfeeding Yes   BMI 21.35 kg/m      General: healthy, alert and no distress  Psych: negative for sleep disturbance, anxiety, nervous breakdown, depression, thoughts of self-harm, thoughts of hurting someone else, agitation and hallucinations  Breasts:  Lactating, Nipples intact with no lesions, Non-tender and No S/S of yeast or mastitis  Abdomen: Soft, flat, non-tender  Incision:  None   Vulva:  deferred  Vagina:  deferred  Cervix:  deferred.    Uterus:  deferred    Adnexa:  deferred  Recto-vaginal:   deferred    A:   25 year old  s/p  here for 6 weeks postpartum visit. Doing well     P:  1. Low back pain: reassurance provided; reviewed conservative measures such as PO tylenol and warm compress application   2. Postpartum diaphoresis: reassurance provided as this can be normal secondary to  hormone let-down from the pregnancy to a non-pregnant state; if symptoms persist past 6 months from delivery, then will recommend TSH testing   3. Contraception: Undecided at this time, will return for future appointment to discuss this  4. Feeding: Breast   5. Return for annual exam or PRN    Julian Rodriguez MD   OB/GYN Resident-PGY2  8/19/2021 3:39 PM

## 2021-08-19 NOTE — NURSING NOTE
"Chief Complaint   Patient presents with     Postpartum Care     2021  Vag-spont   Girl 6lbs 15.8 ozs        Initial /68 (BP Location: Left arm, Cuff Size: Adult Regular)   Pulse 78   Wt 60 kg (132 lb 4.8 oz)   LMP 2020   Breastfeeding Yes   BMI 21.35 kg/m   Estimated body mass index is 21.35 kg/m  as calculated from the following:    Height as of 10/23/19: 1.676 m (5' 6\").    Weight as of this encounter: 60 kg (132 lb 4.8 oz).  BP completed using cuff size: regular    Questioned patient about current smoking habits.  Pt. has never smoked.          The following HM Due: NONE      Terri Jones, CMA on 2021 at 3:22 PM       "

## 2021-11-11 ENCOUNTER — OFFICE VISIT (OUTPATIENT)
Dept: OBGYN | Facility: CLINIC | Age: 26
End: 2021-11-11
Payer: COMMERCIAL

## 2021-11-11 VITALS — SYSTOLIC BLOOD PRESSURE: 122 MMHG | DIASTOLIC BLOOD PRESSURE: 76 MMHG | BODY MASS INDEX: 21.73 KG/M2 | WEIGHT: 134.6 LBS

## 2021-11-11 DIAGNOSIS — Z30.017 NEXPLANON INSERTION: ICD-10-CM

## 2021-11-11 DIAGNOSIS — Z01.812 PRE-PROCEDURE LAB EXAM: ICD-10-CM

## 2021-11-11 DIAGNOSIS — G89.29 CHRONIC BILATERAL LOW BACK PAIN WITH LEFT-SIDED SCIATICA: ICD-10-CM

## 2021-11-11 DIAGNOSIS — M54.42 CHRONIC BILATERAL LOW BACK PAIN WITH LEFT-SIDED SCIATICA: ICD-10-CM

## 2021-11-11 DIAGNOSIS — Z30.017 INSERTION OF IMPLANTABLE SUBDERMAL CONTRACEPTIVE: Primary | ICD-10-CM

## 2021-11-11 LAB — HCG IFA URINE: NEGATIVE

## 2021-11-11 PROCEDURE — 11981 INSERTION DRUG DLVR IMPLANT: CPT | Performed by: OBSTETRICS & GYNECOLOGY

## 2021-11-11 PROCEDURE — 84703 CHORIONIC GONADOTROPIN ASSAY: CPT | Performed by: OBSTETRICS & GYNECOLOGY

## 2021-11-11 NOTE — NURSING NOTE
"Chief Complaint   Patient presents with     Contraception     Nexplanon Insert          Initial /76 (BP Location: Right arm, Cuff Size: Adult Regular)   Wt 61.1 kg (134 lb 9.6 oz)   LMP  (LMP Unknown)   Breastfeeding Yes   BMI 21.73 kg/m   Estimated body mass index is 21.73 kg/m  as calculated from the following:    Height as of 10/23/19: 1.676 m (5' 6\").    Weight as of this encounter: 61.1 kg (134 lb 9.6 oz).  BP completed using cuff size: regular    Questioned patient about current smoking habits.  Pt. has never smoked.          The following HM Due: NONE      Terri Jones, ADDI on 2021 at 1:36 PM           "

## 2021-11-11 NOTE — PROGRESS NOTES
Encounter for Nexplanon insertion    Ms. Olive SEYMOUR Gemechu 26 year old presents for Nexplanon insertion.   She still reports ongoing low back pain since her vaginal delivery and wonders if there is anything else that can be done about it.     /76 (BP Location: Right arm, Cuff Size: Adult Regular)   Wt 61.1 kg (134 lb 9.6 oz)   LMP  (LMP Unknown)   Breastfeeding Yes   BMI 21.73 kg/m      General Appearance: NAD    Procedure: Nexplanon insertion       Reason for insertion: contraception   Pregnancy test: Negative  Counseling:  Patient counseled on potential side effects of Nexplanon, including unpredictable spotting for at least 2-3 months, decreased dysmenorrhea  and plan for removal/replacement of Nexplanon in 3 years or when desired.   Consent:   Risks, benefits of treatment, and no treatment were discussed.  Patient's questions were elicited and answered. Written consent signed and scanned into medical record.   Procedure:  The bicipital grove was identified and measured 3cm from the crease of Elbow. The area was prepped with Betadine solution. The area of insertion was injected with 1% lidocaine for local anesthesia. The Nexplanon was inserted at the 3cm gustavo and advaced all while tenting up tracking along the subcutaneous tissue. Once the needle bed was completely inserted, the Nexplanon deployed and the device retracted, leaving the Nexplanon implant in place. The Nexplanon device was inspected with evidence of deployment and the arm palpated.  EBL:  minimal   Complications:  none   Tolerance of Procedure:  Patient tolerated procedure well.        A/P: 1) Encounter for Nexplanon insertion   Pt was instructed to call if she experiences any bruising, redness, purulent discharge at incision site. May take Ibuprofen 400-800 mg PO TID PRN or Naproxen 500 mg PO BID for pain at incision site.     2) Low back pain  -- physical therapy referral ordered  -- patient agrees to referral     Julian Sales  MD Michael  [facility]

## 2022-07-08 ENCOUNTER — OFFICE VISIT (OUTPATIENT)
Dept: INTERNAL MEDICINE | Facility: CLINIC | Age: 27
End: 2022-07-08
Payer: COMMERCIAL

## 2022-07-08 VITALS
DIASTOLIC BLOOD PRESSURE: 83 MMHG | WEIGHT: 118 LBS | HEART RATE: 64 BPM | TEMPERATURE: 98.9 F | SYSTOLIC BLOOD PRESSURE: 143 MMHG | OXYGEN SATURATION: 98 % | BODY MASS INDEX: 19.05 KG/M2

## 2022-07-08 DIAGNOSIS — M25.50 MULTIPLE JOINT PAIN: ICD-10-CM

## 2022-07-08 DIAGNOSIS — R53.83 FATIGUE, UNSPECIFIED TYPE: Primary | ICD-10-CM

## 2022-07-08 LAB
ALBUMIN UR-MCNC: NEGATIVE MG/DL
APPEARANCE UR: CLEAR
BACTERIA #/AREA URNS HPF: ABNORMAL /HPF
BASOPHILS # BLD AUTO: 0 10E3/UL (ref 0–0.2)
BASOPHILS NFR BLD AUTO: 0 %
BILIRUB UR QL STRIP: NEGATIVE
COLOR UR AUTO: YELLOW
CRP SERPL-MCNC: <3 MG/L
EOSINOPHIL # BLD AUTO: 0.3 10E3/UL (ref 0–0.7)
EOSINOPHIL NFR BLD AUTO: 5 %
ERYTHROCYTE [DISTWIDTH] IN BLOOD BY AUTOMATED COUNT: 13.2 % (ref 10–15)
ERYTHROCYTE [SEDIMENTATION RATE] IN BLOOD BY WESTERGREN METHOD: 22 MM/HR (ref 0–20)
GLUCOSE UR STRIP-MCNC: NEGATIVE MG/DL
HCT VFR BLD AUTO: 43.4 % (ref 35–47)
HGB BLD-MCNC: 14.2 G/DL (ref 11.7–15.7)
HGB UR QL STRIP: ABNORMAL
KETONES UR STRIP-MCNC: NEGATIVE MG/DL
LEUKOCYTE ESTERASE UR QL STRIP: NEGATIVE
LYMPHOCYTES # BLD AUTO: 2.2 10E3/UL (ref 0.8–5.3)
LYMPHOCYTES NFR BLD AUTO: 46 %
MCH RBC QN AUTO: 26.9 PG (ref 26.5–33)
MCHC RBC AUTO-ENTMCNC: 32.7 G/DL (ref 31.5–36.5)
MCV RBC AUTO: 82 FL (ref 78–100)
MONOCYTES # BLD AUTO: 0.3 10E3/UL (ref 0–1.3)
MONOCYTES NFR BLD AUTO: 7 %
NEUTROPHILS # BLD AUTO: 2 10E3/UL (ref 1.6–8.3)
NEUTROPHILS NFR BLD AUTO: 42 %
NITRATE UR QL: NEGATIVE
PH UR STRIP: 5.5 [PH] (ref 5–7)
PLATELET # BLD AUTO: 211 10E3/UL (ref 150–450)
RBC # BLD AUTO: 5.28 10E6/UL (ref 3.8–5.2)
RBC #/AREA URNS AUTO: ABNORMAL /HPF
SP GR UR STRIP: 1.02 (ref 1–1.03)
SQUAMOUS #/AREA URNS AUTO: ABNORMAL /LPF
UROBILINOGEN UR STRIP-ACNC: 0.2 E.U./DL
WBC # BLD AUTO: 4.8 10E3/UL (ref 4–11)
WBC #/AREA URNS AUTO: ABNORMAL /HPF
WBC CLUMPS #/AREA URNS HPF: PRESENT /HPF

## 2022-07-08 PROCEDURE — 86200 CCP ANTIBODY: CPT | Performed by: INTERNAL MEDICINE

## 2022-07-08 PROCEDURE — 80050 GENERAL HEALTH PANEL: CPT | Performed by: INTERNAL MEDICINE

## 2022-07-08 PROCEDURE — 86038 ANTINUCLEAR ANTIBODIES: CPT | Performed by: INTERNAL MEDICINE

## 2022-07-08 PROCEDURE — 82728 ASSAY OF FERRITIN: CPT | Performed by: INTERNAL MEDICINE

## 2022-07-08 PROCEDURE — 86140 C-REACTIVE PROTEIN: CPT | Performed by: INTERNAL MEDICINE

## 2022-07-08 PROCEDURE — 85652 RBC SED RATE AUTOMATED: CPT | Performed by: INTERNAL MEDICINE

## 2022-07-08 PROCEDURE — 81001 URINALYSIS AUTO W/SCOPE: CPT | Performed by: INTERNAL MEDICINE

## 2022-07-08 PROCEDURE — 86431 RHEUMATOID FACTOR QUANT: CPT | Performed by: INTERNAL MEDICINE

## 2022-07-08 PROCEDURE — 99204 OFFICE O/P NEW MOD 45 MIN: CPT | Performed by: INTERNAL MEDICINE

## 2022-07-08 PROCEDURE — 83550 IRON BINDING TEST: CPT | Performed by: INTERNAL MEDICINE

## 2022-07-08 PROCEDURE — 36415 COLL VENOUS BLD VENIPUNCTURE: CPT | Performed by: INTERNAL MEDICINE

## 2022-07-08 NOTE — PROGRESS NOTES
Assessment & Plan     Fatigue, unspecified type  Multiple joint pain  Unclear etiology of these symptoms. Considering wide DDX, including anemia vs thyroid vs autoimmune vs renal vs liver vs infectious vs other. Will check below work-up. Further plan pending work-up. Will contact using  with results and next steps.  - CBC with Platelets & Differential; Future  - Comprehensive metabolic panel; Future  - TSH with free T4 reflex; Future  - Ferritin; Future  - Iron & Iron Binding Capacity; Future  - U/A; Future  - Erythrocyte sedimentation rate auto; Future  - CRP inflammation; Future  - Anti Nuclear Irina IgG by IFA with Reflex; Future  - Cyclic Citrullinated Peptide Antibody IgG; Future  - Rheumatoid factor; Future    F/u with regular PCP at regular interval or sooner VAIBHAV Kebede MD  [facility]    Beth Schaefer is a 26 year old presenting for the following health issues. This is the first time I have met Olive. We utilized a phone  for this visit.    HPI   She gets tired when she works and gets hot and sweaty. Fatigue all of the time whether working or not. She is losing hair and it is breaking. Has pain in all her joints. Heel hurts when she stands.    Symptoms ongoing for ~2 months. All joints have been hurting. No joint swelling. No rash. No documented fever but sweaty & chills. No blood in stool. No vaginal discharge or symptoms. This has never happened before. No difficulty swallowing or neck symptoms. No vision changes but does feel dizzy at times. No one around her has had similar symptoms.    Review of Systems   Constitutional, HEENT, derm, gi, gyn, and MSK systems are negative, except as otherwise noted.      Objective    BP (!) 143/83   Pulse 64   Temp 98.9  F (37.2  C) (Oral)   Wt 53.5 kg (118 lb)   SpO2 98%   BMI 19.05 kg/m    Body mass index is 19.05 kg/m .     Physical Exam   GENERAL: alert and in no distress.  EYES:  conjunctivae/corneas clear. EOMs grossly intact  HENT: Facies symmetric.  RESP: CTAB, no w/r/r.  CV: RRR, no m/r/g.  GI: NT, ND, without rebound tenderness or guarding  MSK: Moves all four extremities freely.  SKIN: No significant ulcers, lesions, or rashes on the visualized portions of the skin  NEURO: CN II-XII grossly intact.

## 2022-07-09 LAB
ALBUMIN SERPL-MCNC: 4.2 G/DL (ref 3.4–5)
ALP SERPL-CCNC: 81 U/L (ref 40–150)
ALT SERPL W P-5'-P-CCNC: 18 U/L (ref 0–50)
ANION GAP SERPL CALCULATED.3IONS-SCNC: 7 MMOL/L (ref 3–14)
AST SERPL W P-5'-P-CCNC: 9 U/L (ref 0–45)
BILIRUB SERPL-MCNC: 0.2 MG/DL (ref 0.2–1.3)
BUN SERPL-MCNC: 13 MG/DL (ref 7–30)
CALCIUM SERPL-MCNC: 9.1 MG/DL (ref 8.5–10.1)
CHLORIDE BLD-SCNC: 108 MMOL/L (ref 94–109)
CO2 SERPL-SCNC: 24 MMOL/L (ref 20–32)
CREAT SERPL-MCNC: 0.58 MG/DL (ref 0.52–1.04)
FERRITIN SERPL-MCNC: 26 NG/ML (ref 12–150)
GFR SERPL CREATININE-BSD FRML MDRD: >90 ML/MIN/1.73M2
GLUCOSE BLD-MCNC: 92 MG/DL (ref 70–99)
IRON SATN MFR SERPL: 27 % (ref 15–46)
IRON SERPL-MCNC: 72 UG/DL (ref 35–180)
POTASSIUM BLD-SCNC: 3.6 MMOL/L (ref 3.4–5.3)
PROT SERPL-MCNC: 7.7 G/DL (ref 6.8–8.8)
SODIUM SERPL-SCNC: 139 MMOL/L (ref 133–144)
TIBC SERPL-MCNC: 263 UG/DL (ref 240–430)
TSH SERPL DL<=0.005 MIU/L-ACNC: 1.38 MU/L (ref 0.4–4)

## 2022-07-11 LAB
ANA SER QL IF: NEGATIVE
CCP AB SER IA-ACNC: 2.2 U/ML
RHEUMATOID FACT SER NEPH-ACNC: <6 IU/ML

## 2022-07-18 ENCOUNTER — TELEPHONE (OUTPATIENT)
Dept: INTERNAL MEDICINE | Facility: CLINIC | Age: 27
End: 2022-07-18

## 2022-07-18 NOTE — TELEPHONE ENCOUNTER
Rahul Allen MD   7/12/2022  7:12 AM CDT Back to Top        Team - please call patient with results using an . Her lab work is largely normal. There is trace blood seen in the urine though this is likely a contaminant as the sample appears to not have been a 'clean catch'. Her ESR inflammatory marker is minimally elevated though her CRP inflammatory marker is normal. I'm unclear what are causing her symptoms at this point.     Patient called the clinic for these labs, lab message given. Her symptoms of losing lots of hair, occasional dizziness, and generalized joint pain is still the same as at 7/8/22 OV with Dr. Allen. Her baby is one years old and she states that she is sleeping enough. She would like to know what to do next.

## 2022-07-19 NOTE — TELEPHONE ENCOUNTER
I'm not sure what is causing this patient's symptoms. Next steps would be an infectious disease work-up (HIV, tuberculosis, STDs, etc) to rule those out, though her STD testing has been negative in the past.

## 2022-07-20 NOTE — TELEPHONE ENCOUNTER
"Patient Contact    Attempt # 1    Was call answered?  Yes.  \"May I please speak with <patient name>\"  Is patient available?   Yes    Provider message relayed.    "

## 2022-08-12 ENCOUNTER — APPOINTMENT (OUTPATIENT)
Dept: GENERAL RADIOLOGY | Facility: CLINIC | Age: 27
End: 2022-08-12
Attending: EMERGENCY MEDICINE
Payer: COMMERCIAL

## 2022-08-12 ENCOUNTER — HOSPITAL ENCOUNTER (EMERGENCY)
Facility: CLINIC | Age: 27
Discharge: HOME | End: 2022-08-12
Attending: EMERGENCY MEDICINE | Admitting: EMERGENCY MEDICINE
Payer: COMMERCIAL

## 2022-08-12 VITALS
HEART RATE: 69 BPM | SYSTOLIC BLOOD PRESSURE: 132 MMHG | TEMPERATURE: 97 F | DIASTOLIC BLOOD PRESSURE: 89 MMHG | RESPIRATION RATE: 16 BRPM | OXYGEN SATURATION: 99 %

## 2022-08-12 DIAGNOSIS — R07.0 CHRONIC THROAT PAIN: ICD-10-CM

## 2022-08-12 DIAGNOSIS — G89.29 CHRONIC THROAT PAIN: ICD-10-CM

## 2022-08-12 LAB
ANION GAP SERPL CALCULATED.3IONS-SCNC: 11 MMOL/L (ref 7–15)
BASOPHILS # BLD AUTO: 0 10E3/UL (ref 0–0.2)
BASOPHILS NFR BLD AUTO: 1 %
BUN SERPL-MCNC: 8.4 MG/DL (ref 6–20)
CALCIUM SERPL-MCNC: 9.6 MG/DL (ref 8.6–10)
CHLORIDE SERPL-SCNC: 103 MMOL/L (ref 98–107)
CREAT SERPL-MCNC: 0.59 MG/DL (ref 0.51–0.95)
DEPRECATED HCO3 PLAS-SCNC: 25 MMOL/L (ref 22–29)
DEPRECATED S PYO AG THROAT QL EIA: NEGATIVE
EOSINOPHIL # BLD AUTO: 0.2 10E3/UL (ref 0–0.7)
EOSINOPHIL NFR BLD AUTO: 5 %
ERYTHROCYTE [DISTWIDTH] IN BLOOD BY AUTOMATED COUNT: 12.6 % (ref 10–15)
GFR SERPL CREATININE-BSD FRML MDRD: >90 ML/MIN/1.73M2
GLUCOSE SERPL-MCNC: 90 MG/DL (ref 70–99)
GROUP A STREP BY PCR: NOT DETECTED
HCG SERPL QL: NEGATIVE
HCT VFR BLD AUTO: 45.7 % (ref 35–47)
HGB BLD-MCNC: 14.6 G/DL (ref 11.7–15.7)
IMM GRANULOCYTES # BLD: 0 10E3/UL
IMM GRANULOCYTES NFR BLD: 1 %
LYMPHOCYTES # BLD AUTO: 2.3 10E3/UL (ref 0.8–5.3)
LYMPHOCYTES NFR BLD AUTO: 43 %
MCH RBC QN AUTO: 26.7 PG (ref 26.5–33)
MCHC RBC AUTO-ENTMCNC: 31.9 G/DL (ref 31.5–36.5)
MCV RBC AUTO: 84 FL (ref 78–100)
MONOCYTES # BLD AUTO: 0.3 10E3/UL (ref 0–1.3)
MONOCYTES NFR BLD AUTO: 7 %
NEUTROPHILS # BLD AUTO: 2.2 10E3/UL (ref 1.6–8.3)
NEUTROPHILS NFR BLD AUTO: 43 %
NRBC # BLD AUTO: 0 10E3/UL
NRBC BLD AUTO-RTO: 0 /100
PLATELET # BLD AUTO: 248 10E3/UL (ref 150–450)
POTASSIUM SERPL-SCNC: 3.9 MMOL/L (ref 3.4–5.3)
RBC # BLD AUTO: 5.47 10E6/UL (ref 3.8–5.2)
SODIUM SERPL-SCNC: 139 MMOL/L (ref 136–145)
TSH SERPL DL<=0.005 MIU/L-ACNC: 2.33 UIU/ML (ref 0.3–4.2)
WBC # BLD AUTO: 5.1 10E3/UL (ref 4–11)

## 2022-08-12 PROCEDURE — 80048 BASIC METABOLIC PNL TOTAL CA: CPT | Performed by: EMERGENCY MEDICINE

## 2022-08-12 PROCEDURE — 85025 COMPLETE CBC W/AUTO DIFF WBC: CPT | Performed by: EMERGENCY MEDICINE

## 2022-08-12 PROCEDURE — 87651 STREP A DNA AMP PROBE: CPT | Performed by: EMERGENCY MEDICINE

## 2022-08-12 PROCEDURE — 99284 EMERGENCY DEPT VISIT MOD MDM: CPT

## 2022-08-12 PROCEDURE — 36415 COLL VENOUS BLD VENIPUNCTURE: CPT | Performed by: EMERGENCY MEDICINE

## 2022-08-12 PROCEDURE — 84703 CHORIONIC GONADOTROPIN ASSAY: CPT | Performed by: EMERGENCY MEDICINE

## 2022-08-12 PROCEDURE — 70360 X-RAY EXAM OF NECK: CPT

## 2022-08-12 PROCEDURE — 84443 ASSAY THYROID STIM HORMONE: CPT | Performed by: EMERGENCY MEDICINE

## 2022-08-12 ASSESSMENT — ACTIVITIES OF DAILY LIVING (ADL): ADLS_ACUITY_SCORE: 33

## 2022-08-12 NOTE — ED PROVIDER NOTES
"  History     Chief Complaint:    Pharyngitis      HPI   Olive Lima is a 26 year old female who presents with multiple complaints.    Patient is a 26-year-old female who speaks only [place].  Patient is brought to the emergency room by private car with multiple complaints.  Patient has had a feeling of sore throat intermittently for about a year.  No clear cause of this is been identified.  Over the last 2 to 3 days its been worse.  Is been associated with hair loss.  Patient thinks she might have a thyroid problem.  Patient's had no fever or chills.  Patient has had pain with swallowing.  She feels her neck is \"swollen\".  Presents the emergency room by private car for assessment.  History is obtained using [employer] .    Review of Systems  Positive for throat swelling negative for shortness of breath negative for hoarseness negative for vomiting or diarrhea negative for known sick contacts all the systems negative except as above    Allergies:    No Known Allergies      Medications:      etonogestrel (NEXPLANON) 68 MG IMPL        Past Medical History:      Past Medical History:   Diagnosis Date     Hyperemesis of pregnancy      Vaginal delivery 7/2/2021     Patient Active Problem List    Diagnosis Date Noted     Nexplanon insertion 11/11/2021     Priority: Medium     Nexplanon inserted     Due for removal : 11/11/2024     LOT: [id number]            Past Surgical History:      Past Surgical History:   Procedure Laterality Date     NO HISTORY OF SURGERY         Family History:      No family history on file.    Social History:    No Ref-Primary, Physician  The patient presents to the ED alone    Physical Exam     Patient Vitals for the past 24 hrs:   BP Temp Temp src Pulse Resp SpO2   08/12/22 1221 (!) 148/101 97  F (36.1  C) Temporal 67 16 99 %       Physical Exam  Vitals reviewed.   HENT:      Head: Normocephalic.      Right Ear: Tympanic membrane normal.      Left Ear: Tympanic membrane normal.      Nose: " No congestion.      Mouth/Throat:      Mouth: Mucous membranes are moist.      Pharynx: Oropharynx is clear. No pharyngeal swelling, oropharyngeal exudate, posterior oropharyngeal erythema or uvula swelling.      Tonsils: No tonsillar exudate or tonsillar abscesses.   Eyes:      Conjunctiva/sclera: Conjunctivae normal.   Cardiovascular:      Rate and Rhythm: Normal rate and regular rhythm.      Heart sounds: Normal heart sounds.   Abdominal:      Palpations: Abdomen is soft.   Musculoskeletal:      Cervical back: Normal range of motion.   Skin:     General: Skin is warm.      Capillary Refill: Capillary refill takes less than 2 seconds.   Neurological:      General: No focal deficit present.      Mental Status: She is alert.           Emergency Department Course       No results found for this or any previous visit.    Imaging:  Neck soft tissue XR   Final Result        Report per radiology    Laboratory:  Labs Ordered and Resulted from Time of ED Arrival to Time of ED Departure   CBC WITH PLATELETS AND DIFFERENTIAL - Abnormal       Result Value    WBC Count 5.1      RBC Count 5.47 (*)     Hemoglobin 14.6      Hematocrit 45.7      MCV 84      MCH 26.7      MCHC 31.9      RDW 12.6      Platelet Count 248      % Neutrophils 43      % Lymphocytes 43      % Monocytes 7      % Eosinophils 5      % Basophils 1      % Immature Granulocytes 1      NRBCs per 100 WBC 0      Absolute Neutrophils 2.2      Absolute Lymphocytes 2.3      Absolute Monocytes 0.3      Absolute Eosinophils 0.2      Absolute Basophils 0.0      Absolute Immature Granulocytes 0.0      Absolute NRBCs 0.0     BASIC METABOLIC PANEL - Normal    Creatinine 0.59      Sodium 139      Potassium 3.9      Urea Nitrogen 8.4      Chloride 103      Carbon Dioxide (CO2) 25      Anion Gap 11      Glucose 90      GFR Estimate >90      Calcium 9.6     TSH WITH FREE T4 REFLEX - Normal    TSH 2.33     HCG QUALITATIVE PREGNANCY - Normal    hCG Serum Qualitative Negative      STREPTOCOCCUS A RAPID SCREEN W REFELX TO PCR - Normal    Group A Strep antigen Negative           Emergency Department Course:             Reviewed:    I reviewed nursing notes, vitals and past medical history    Assessments:  1230 I obtained history and examined the patient as noted above.   1430 I rechecked the patient and explained finding.       Consults:            Interventions:    Medications - No data to display    Disposition:  The patient was discharged to home.     Impression & Plan        Medical Decision Making:  Patient presents with a year of throat swelling and not feeling well progressively worse over the last 3 days.  Patient is voice is normal without concerns for hoarseness.  There is no stridor.  Soft tissue x-rays show no soft tissue distention or retropharyngeal edema.  Patient's oropharyngeal exam is normal.  Due to history of hair loss thyroid function test were sent and were normal.  Patient was offered reassurance and discharged home because of her symptoms are unclear encouraged follow-up with primary care as patient's had chronic pharyngitis no need for acute or emergent imaging other than what has been performed and was discharged in stable condition.        Covid-19  Olive Lima was evaluated during a global COVID-19 pandemic, which necessitated consideration that the patient might be at risk for infection with the SARS-CoV-2 virus that causes COVID-19.   Applicable protocols for evaluation were followed during the patient's care.   COVID-19 was considered as part of the patient's evaluation.    Diagnosis:    ICD-10-CM    1. Chronic throat pain  R07.0     G89.29        Discharge Medications:  Discharge Medication List as of 8/12/2022  2:34 PM      START taking these medications    Details   magic mouthwash suspension (diphenhydrAMINE, lidocaine, aluminum-magnesium & simethicone) Swish and swallow 10 mLs in mouth every 6 hours as needed for sore throat, Disp-120 mL, R-0, Local  Print60 cc Maalox 30 cc viscous lidocaine 30 cc Benadryl.               Scribe Disclosure:  I, Matthew Lin MD, am serving as a scribe at 12:58 PM on 8/12/2022 to document services personally performed by Matthew Lin MD based on my observations and the provider's statements to me.      Matthew Lin MD  08/14/22 1833

## 2022-08-12 NOTE — DISCHARGE INSTRUCTIONS
We have done lab work thyroid function test and x-rays and they are all normal.  Please follow-up with your regular doctor to discuss further work-up of your throat concerns.  Your examination and lab work and x-ray showed no concerns for emergent condition.  Okay to use Magic mouthwash and gargle and spit if it hurts when you swallow.

## 2022-08-12 NOTE — ED TRIAGE NOTES
Sore throat with swelling x 3 days. Pt has dr appointment scheduled but the pain was too bad to wait. No hx tonsillectomy. VSS on RA. Spouse assisting with [place] interpretation in triage.     Addendum: pt is on birth control and has had vaginal bleeding x 1 month

## 2022-11-14 ENCOUNTER — OFFICE VISIT (OUTPATIENT)
Dept: OBGYN | Facility: CLINIC | Age: 27
End: 2022-11-14
Payer: COMMERCIAL

## 2022-11-14 VITALS
DIASTOLIC BLOOD PRESSURE: 84 MMHG | BODY MASS INDEX: 20.29 KG/M2 | SYSTOLIC BLOOD PRESSURE: 122 MMHG | WEIGHT: 125.7 LBS | HEART RATE: 81 BPM

## 2022-11-14 DIAGNOSIS — N93.9 ABNORMAL UTERINE BLEEDING (AUB): Primary | ICD-10-CM

## 2022-11-14 DIAGNOSIS — R10.2 PELVIC PAIN IN FEMALE: ICD-10-CM

## 2022-11-14 DIAGNOSIS — Z97.5 BREAKTHROUGH BLEEDING ON NEXPLANON: ICD-10-CM

## 2022-11-14 DIAGNOSIS — N92.1 BREAKTHROUGH BLEEDING ON NEXPLANON: ICD-10-CM

## 2022-11-14 LAB — HCG UR QL: NEGATIVE

## 2022-11-14 PROCEDURE — 99214 OFFICE O/P EST MOD 30 MIN: CPT | Performed by: OBSTETRICS & GYNECOLOGY

## 2022-11-14 PROCEDURE — 81025 URINE PREGNANCY TEST: CPT | Performed by: OBSTETRICS & GYNECOLOGY

## 2022-11-14 RX ORDER — LEVONORGESTREL/ETHIN.ESTRADIOL 0.1-0.02MG
2 TABLET ORAL DAILY
Qty: 28 TABLET | Refills: 0 | Status: SHIPPED | OUTPATIENT
Start: 2022-11-14 | End: 2023-04-19 | Stop reason: ALTCHOICE

## 2022-11-14 NOTE — PROGRESS NOTES
Abnormal uterine bleeding  Breakthrough bleeding on Nexplanon    Ms.Meskerem LION Acostau 27 year old presents for daily vaginal bleeding that started on July 18, 2022. She had a Nexplanon inserted by me on November 2021. She reports amenorrhea since then until bleeding resumed in July 2022. Bleeding fluctuates between light and heavy, but more recently she has noticed the color of the blood change to a dark red color. She also acknowledges bilateral pelvic cramping for about one month. Cramping comes and goes. She concerned about her uterus given her bleeding. She does endorse hair loss as well and flushed/heating sensation.       Past Medical History:   Diagnosis Date     Hyperemesis of pregnancy      Vaginal delivery 7/2/2021       Past Surgical History:   Procedure Laterality Date     NO HISTORY OF SURGERY         Current Outpatient Medications   Medication     levonorgestrel-ethinyl estradiol (AVIANE) 0.1-20 MG-MCG tablet     etonogestrel (NEXPLANON) 68 MG IMPL     magic mouthwash suspension (diphenhydrAMINE, lidocaine, aluminum-magnesium & simethicone)     No current facility-administered medications for this visit.       No Known Allergies    Social History     Tobacco Use     Smoking status: Never     Smokeless tobacco: Never   Substance Use Topics     Alcohol use: Never     Drug use: Never       No family history on file.    ROS: 10 point review of systems negative except for pertinent positives stated in the HPI      Exam:   /84 (BP Location: Left arm, Cuff Size: Adult Regular)   Pulse 81   Wt 57 kg (125 lb 11.2 oz)   LMP 07/18/2022 (Exact Date)   BMI 20.29 kg/m    General Appearance: Well nourished, well developed female, NAD, AOx3  HEET: Atraumatic, normocephalic  Pelvic: deferred    A/P: Abnormal uterine bleeding  -- suspect that it is breakthrough bleeding from Nexplanon; reviewed pathophysiology of this condition   -- recommended OCP taper to alleviate current bleeding; reviewed tapering  regimen   -- if bleeding persists or recurs, then discussed consideration for Nexplanon removal and alternative birth control option     Pelvic pain in female  -- pelvic ultrasound ordered     Total time spent was 30 minutes on the date of the encounter in chart review, patient visit, review of tests, documentation and counseling on the above medical conditions    Julian Rodriguez MD  [facility]

## 2022-11-14 NOTE — NURSING NOTE
"Chief Complaint   Patient presents with     Consult     Menses in July , spotting since  pelvic pain  X 1 month         Initial /84 (BP Location: Left arm, Cuff Size: Adult Regular)   Pulse 81   Wt 57 kg (125 lb 11.2 oz)   LMP 2022 (Exact Date)   BMI 20.29 kg/m   Estimated body mass index is 20.29 kg/m  as calculated from the following:    Height as of 10/23/19: 1.676 m (5' 6\").    Weight as of this encounter: 57 kg (125 lb 11.2 oz).  BP completed using cuff size: regular    Questioned patient about current smoking habits.  Pt. has never smoked.          The following HM Due: NONE      Terri Jones, ADDI on 2022 at 1:05 PM  "

## 2022-11-21 ENCOUNTER — ANCILLARY PROCEDURE (OUTPATIENT)
Dept: ULTRASOUND IMAGING | Facility: CLINIC | Age: 27
End: 2022-11-21
Attending: OBSTETRICS & GYNECOLOGY
Payer: COMMERCIAL

## 2022-11-21 DIAGNOSIS — N93.9 ABNORMAL UTERINE BLEEDING (AUB): ICD-10-CM

## 2022-11-21 DIAGNOSIS — N92.1 BREAKTHROUGH BLEEDING ON NEXPLANON: ICD-10-CM

## 2022-11-21 DIAGNOSIS — Z97.5 BREAKTHROUGH BLEEDING ON NEXPLANON: ICD-10-CM

## 2022-11-21 DIAGNOSIS — R10.2 PELVIC PAIN IN FEMALE: ICD-10-CM

## 2022-11-21 PROCEDURE — 76856 US EXAM PELVIC COMPLETE: CPT | Performed by: FAMILY MEDICINE

## 2022-11-21 PROCEDURE — 76830 TRANSVAGINAL US NON-OB: CPT | Performed by: FAMILY MEDICINE

## 2023-04-19 ENCOUNTER — OFFICE VISIT (OUTPATIENT)
Dept: INTERNAL MEDICINE | Facility: CLINIC | Age: 28
End: 2023-04-19
Payer: COMMERCIAL

## 2023-04-19 VITALS
DIASTOLIC BLOOD PRESSURE: 76 MMHG | SYSTOLIC BLOOD PRESSURE: 120 MMHG | WEIGHT: 121 LBS | BODY MASS INDEX: 19.53 KG/M2 | OXYGEN SATURATION: 100 % | RESPIRATION RATE: 20 BRPM | TEMPERATURE: 98.5 F | HEART RATE: 71 BPM

## 2023-04-19 DIAGNOSIS — M54.50 CHRONIC BILATERAL LOW BACK PAIN WITHOUT SCIATICA: ICD-10-CM

## 2023-04-19 DIAGNOSIS — M25.50 MULTIPLE JOINT PAIN: Primary | ICD-10-CM

## 2023-04-19 DIAGNOSIS — E55.9 VITAMIN D DEFICIENCY: ICD-10-CM

## 2023-04-19 DIAGNOSIS — G89.29 CHRONIC BILATERAL LOW BACK PAIN WITHOUT SCIATICA: ICD-10-CM

## 2023-04-19 DIAGNOSIS — R42 DIZZINESS: ICD-10-CM

## 2023-04-19 DIAGNOSIS — R53.83 OTHER FATIGUE: ICD-10-CM

## 2023-04-19 DIAGNOSIS — E87.1 HYPONATREMIA: ICD-10-CM

## 2023-04-19 DIAGNOSIS — L65.9 LOSS OF HAIR: ICD-10-CM

## 2023-04-19 LAB
ERYTHROCYTE [DISTWIDTH] IN BLOOD BY AUTOMATED COUNT: 12.4 % (ref 10–15)
ERYTHROCYTE [SEDIMENTATION RATE] IN BLOOD BY WESTERGREN METHOD: 14 MM/HR (ref 0–20)
HCT VFR BLD AUTO: 44.7 % (ref 35–47)
HGB BLD-MCNC: 15 G/DL (ref 11.7–15.7)
MCH RBC QN AUTO: 27.2 PG (ref 26.5–33)
MCHC RBC AUTO-ENTMCNC: 33.6 G/DL (ref 31.5–36.5)
MCV RBC AUTO: 81 FL (ref 78–100)
PLATELET # BLD AUTO: 238 10E3/UL (ref 150–450)
RBC # BLD AUTO: 5.52 10E6/UL (ref 3.8–5.2)
WBC # BLD AUTO: 5.3 10E3/UL (ref 4–11)

## 2023-04-19 PROCEDURE — 82306 VITAMIN D 25 HYDROXY: CPT

## 2023-04-19 PROCEDURE — 99213 OFFICE O/P EST LOW 20 MIN: CPT

## 2023-04-19 PROCEDURE — 86431 RHEUMATOID FACTOR QUANT: CPT

## 2023-04-19 PROCEDURE — 83540 ASSAY OF IRON: CPT

## 2023-04-19 PROCEDURE — 85652 RBC SED RATE AUTOMATED: CPT

## 2023-04-19 PROCEDURE — 82728 ASSAY OF FERRITIN: CPT

## 2023-04-19 PROCEDURE — 86140 C-REACTIVE PROTEIN: CPT

## 2023-04-19 PROCEDURE — 85027 COMPLETE CBC AUTOMATED: CPT

## 2023-04-19 PROCEDURE — 36415 COLL VENOUS BLD VENIPUNCTURE: CPT

## 2023-04-19 PROCEDURE — 80048 BASIC METABOLIC PNL TOTAL CA: CPT

## 2023-04-19 PROCEDURE — 86038 ANTINUCLEAR ANTIBODIES: CPT

## 2023-04-19 PROCEDURE — 83550 IRON BINDING TEST: CPT

## 2023-04-19 ASSESSMENT — PAIN SCALES - GENERAL: PAINLEVEL: EXTREME PAIN (8)

## 2023-04-19 ASSESSMENT — ENCOUNTER SYMPTOMS: BACK PAIN: 1

## 2023-04-19 NOTE — PROGRESS NOTES
Assessment & Plan     Multiple joint pain  Patient has pain in joints and legs arms elbows.  She does not have any inflammation seen on exam or point tenderness.  Her ESR is normal other labs are pending.  - Anti Nuclear Irina IgG by IFA with Reflex; Future  - ESR: Erythrocyte sedimentation rate; Future  - CRP, inflammation; Future  - Rheumatoid factor; Future  - CBC with platelets; Future  - Iron and iron binding capacity; Future  - Ferritin; Future  - Basic metabolic panel  (Ca, Cl, CO2, Creat, Gluc, K, Na, BUN); Future  - Anti Nuclear Irina IgG by IFA with Reflex  - ESR: Erythrocyte sedimentation rate  - CRP, inflammation  - Rheumatoid factor  - CBC with platelets  - Iron and iron binding capacity  - Ferritin  - Basic metabolic panel  (Ca, Cl, CO2, Creat, Gluc, K, Na, BUN)    Chronic bilateral low back pain without sciatica  Patient has pain in right lower back without sciatica.  Will refer to PT for eval and treatment.  If there is no improvement patient may be seen by pain clinic  - Physical Therapy Referral; Future  - Iron and iron binding capacity; Future  - Iron and iron binding capacity    Loss of hair  We will check vitamin D and refer to dermatology  - Vitamin D Deficiency; Future  - Adult Dermatology Referral; Future  - Vitamin D Deficiency    Other fatigue  We will check iron vitamin D CBC and BMP to assess.  This has been ongoing for about 1 year.  Has recent thyroid check that was within normal limits  - Vitamin D Deficiency; Future  - CBC with platelets; Future  - Iron and iron binding capacity; Future  - Ferritin; Future  - Basic metabolic panel  (Ca, Cl, CO2, Creat, Gluc, K, Na, BUN); Future  - Vitamin D Deficiency  - CBC with platelets  - Iron and iron binding capacity  - Ferritin  - Basic metabolic panel  (Ca, Cl, CO2, Creat, Gluc, K, Na, BUN)    Dizziness  She complains of intermittent dizziness without palpitations or other red flag symptoms.  Check labs to assess for cause-patient will need to  follow-up if there is no cause identified.  Most of the time for appointment today was spent on low back pain and joint pain  - Vitamin D Deficiency; Future  - Vitamin D Deficiency      Malcolm Gonzalez NP  [facility] SON Schaefer is a 27 year old, presenting for the following health issues:  Back Pain  dizzy, hair loss, joint pain level 5/10      4/19/2023     4:09 PM   Additional Questions   Roomed by Jesúsene   Accompanied by self         4/19/2023     4:09 PM   Patient Reported Additional Medications   Patient reports taking the following new medications son       Video  was used for appointment    Back Pain     History of Present Illness       Back Pain:  She presents for follow up of back pain. Patient's back pain is a chronic problem.  Location of back pain:  Right middle of back and left middle of back  Description of back pain: burning, cramping and sharp  Back pain spreads: right buttocks, right knee and right shoulder    Since patient first noticed back pain, pain is: no longer a problem  Does back pain interfere with her job:  No      Reason for visit:  Follow up    She eats 0-1 servings of fruits and vegetables daily.She consumes 1 sweetened beverage(s) daily.She exercises with enough effort to increase her heart rate 9 or less minutes per day.  She exercises with enough effort to increase her heart rate 3 or less days per week.   She is taking medications regularly.     Fatigue- ongoing for a year     Hair loss-    Joint pain- has been going on for one year ankles knees, shins, wrists. This happens intermittently for no apparent reason- potentially related to the weather. It is about 6/10.  Has never noticed rash on body.     Low back pain- back it across low back, she does feel something move in her back sometimes as well     Review of Systems   Musculoskeletal: Positive for back pain.      Constitutional, HEENT, cardiovascular, pulmonary, GI, ,  musculoskeletal, neuro, skin, endocrine and psych systems are negative, except as otherwise noted.      Objective    LMP 04/12/2023   Breastfeeding Yes   There is no height or weight on file to calculate BMI.  Physical Exam   GENERAL: alert and no distress  EYES: Eyes grossly normal to inspection  HENT: ear canals and TM's normal, nose and mouth without ulcers or lesions  NECK: no adenopathy, no asymmetry, masses, or scars and thyroid normal to palpation  RESP: lungs clear to auscultation - no rales, rhonchi or wheezes  CV: regular rate and rhythm, normal S1 S2, no S3 or S4, no murmur, click or rub, no peripheral edema and peripheral pulses strong  ABDOMEN: soft, nontender, no hepatosplenomegaly, no masses and bowel sounds normal  MS: no gross musculoskeletal defects noted, no edema  SKIN: no suspicious lesions or rashes  NEURO: Normal strength and tone, mentation intact and speech normal  PSYCH: mentation appears normal, affect normal/bright

## 2023-04-19 NOTE — PATIENT INSTRUCTIONS
Our lab is in [address]. I will comment on your results when they are all back.      Dermatology and physical therapy referral is in

## 2023-04-19 NOTE — LETTER
April 24, 2023      Olive Lima  [address]  [place] [address]        Dear ,    We are writing to inform you of your test results.    Test page    Resulted Orders   Vitamin D Deficiency   Result Value Ref Range    Vitamin D, Total (25-Hydroxy) 5 (L) 20 - 75 ug/L    Narrative    Season, race, dietary intake, and treatment affect the concentration of 25-hydroxy-Vitamin D. Values may decrease during winter months and increase during summer months. Values 20-29 ug/L may indicate Vitamin D insufficiency and values <20 ug/L may indicate Vitamin D deficiency.    Vitamin D determination is routinely performed by an immunoassay specific for 25 hydroxyvitamin D3.  If an individual is on vitamin D2(ergocalciferol) supplementation, please specify 25 OH vitamin D2 and D3 level determination by LCMSMS test [id number].     ESR: Erythrocyte sedimentation rate   Result Value Ref Range    Erythrocyte Sedimentation Rate 14 0 - 20 mm/hr   CRP, inflammation   Result Value Ref Range    CRP Inflammation <3.00 <5.00 mg/L   Rheumatoid factor   Result Value Ref Range    Rheumatoid Factor <7 <12 IU/mL   CBC with platelets   Result Value Ref Range    WBC Count 5.3 4.0 - 11.0 10e3/uL    RBC Count 5.52 (H) 3.80 - 5.20 10e6/uL    Hemoglobin 15.0 11.7 - 15.7 g/dL    Hematocrit 44.7 35.0 - 47.0 %    MCV 81 78 - 100 fL    MCH 27.2 26.5 - 33.0 pg    MCHC 33.6 31.5 - 36.5 g/dL    RDW 12.4 10.0 - 15.0 %    Platelet Count 238 150 - 450 10e3/uL   Iron and iron binding capacity   Result Value Ref Range    Iron 61 37 - 145 ug/dL    Iron Binding Capacity 271 240 - 430 ug/dL    Iron Sat Index 23 15 - 46 %   Ferritin   Result Value Ref Range    Ferritin 59 6 - 175 ng/mL   Basic metabolic panel  (Ca, Cl, CO2, Creat, Gluc, K, Na, BUN)   Result Value Ref Range    Sodium 134 (L) 136 - 145 mmol/L    Potassium 3.7 3.4 - 5.3 mmol/L    Chloride 100 98 - 107 mmol/L    Carbon Dioxide (CO2) 22 22 - 29 mmol/L    Anion Gap 12 7 - 15 mmol/L     Urea Nitrogen 11.5 6.0 - 20.0 mg/dL    Creatinine 0.66 0.51 - 0.95 mg/dL    Calcium 9.6 8.6 - 10.0 mg/dL    Glucose 80 70 - 99 mg/dL    GFR Estimate >90 >60 mL/min/1.73m2      Comment:      eGFR calculated using 2021 CKD-EPI equation.       If you have any questions or concerns, please call the clinic at the number listed above.       Sincerely,      Malcolm Gonzalez NP

## 2023-04-20 LAB
ANION GAP SERPL CALCULATED.3IONS-SCNC: 12 MMOL/L (ref 7–15)
BUN SERPL-MCNC: 11.5 MG/DL (ref 6–20)
CALCIUM SERPL-MCNC: 9.6 MG/DL (ref 8.6–10)
CHLORIDE SERPL-SCNC: 100 MMOL/L (ref 98–107)
CREAT SERPL-MCNC: 0.66 MG/DL (ref 0.51–0.95)
CRP SERPL-MCNC: <3 MG/L
DEPRECATED CALCIDIOL+CALCIFEROL SERPL-MC: 5 UG/L (ref 20–75)
DEPRECATED HCO3 PLAS-SCNC: 22 MMOL/L (ref 22–29)
FERRITIN SERPL-MCNC: 59 NG/ML (ref 6–175)
GFR SERPL CREATININE-BSD FRML MDRD: >90 ML/MIN/1.73M2
GLUCOSE SERPL-MCNC: 80 MG/DL (ref 70–99)
IRON BINDING CAPACITY (ROCHE): 271 UG/DL (ref 240–430)
IRON SATN MFR SERPL: 23 % (ref 15–46)
IRON SERPL-MCNC: 61 UG/DL (ref 37–145)
POTASSIUM SERPL-SCNC: 3.7 MMOL/L (ref 3.4–5.3)
SODIUM SERPL-SCNC: 134 MMOL/L (ref 136–145)

## 2023-04-21 LAB
ANA SER QL IF: NEGATIVE
RHEUMATOID FACT SER NEPH-ACNC: <7 IU/ML

## 2023-04-26 ENCOUNTER — APPOINTMENT (OUTPATIENT)
Dept: INTERPRETER SERVICES | Facility: CLINIC | Age: 28
End: 2023-04-26
Payer: COMMERCIAL

## 2023-04-27 ENCOUNTER — OFFICE VISIT (OUTPATIENT)
Dept: OBGYN | Facility: CLINIC | Age: 28
End: 2023-04-27
Payer: COMMERCIAL

## 2023-04-27 VITALS
SYSTOLIC BLOOD PRESSURE: 128 MMHG | BODY MASS INDEX: 19.72 KG/M2 | HEART RATE: 74 BPM | DIASTOLIC BLOOD PRESSURE: 88 MMHG | WEIGHT: 122.2 LBS

## 2023-04-27 DIAGNOSIS — Z30.011 ENCOUNTER FOR INITIAL PRESCRIPTION OF CONTRACEPTIVE PILLS: ICD-10-CM

## 2023-04-27 DIAGNOSIS — Z97.5 BREAKTHROUGH BLEEDING ON NEXPLANON: Primary | ICD-10-CM

## 2023-04-27 DIAGNOSIS — N92.1 BREAKTHROUGH BLEEDING ON NEXPLANON: Primary | ICD-10-CM

## 2023-04-27 DIAGNOSIS — Z30.09 BIRTH CONTROL COUNSELING: ICD-10-CM

## 2023-04-27 DIAGNOSIS — Z30.46 ENCOUNTER FOR NEXPLANON REMOVAL: ICD-10-CM

## 2023-04-27 PROCEDURE — 99212 OFFICE O/P EST SF 10 MIN: CPT | Mod: 25 | Performed by: OBSTETRICS & GYNECOLOGY

## 2023-04-27 PROCEDURE — 11982 REMOVE DRUG IMPLANT DEVICE: CPT | Performed by: OBSTETRICS & GYNECOLOGY

## 2023-04-27 RX ORDER — LEVONORGESTREL/ETHIN.ESTRADIOL 0.1-0.02MG
1 TABLET ORAL DAILY
Qty: 84 TABLET | Refills: 3 | Status: SHIPPED | OUTPATIENT
Start: 2023-04-27 | End: 2024-03-04

## 2023-04-27 NOTE — NURSING NOTE
"Chief Complaint   Patient presents with     Follow Up     Bleeding with  Nexplanon   Placed 2021   OV  2022  OCP's      U/S  2022        Initial /88 (BP Location: Left arm, Cuff Size: Adult Regular)   Pulse 74   Wt 55.4 kg (122 lb 3.2 oz)   LMP 2023   Breastfeeding No   BMI 19.72 kg/m   Estimated body mass index is 19.72 kg/m  as calculated from the following:    Height as of 10/23/19: 1.676 m (5' 6\").    Weight as of this encounter: 55.4 kg (122 lb 3.2 oz).  BP completed using cuff size: regular    Questioned patient about current smoking habits.  Pt. has never smoked.          The following HM Due: NONE      Terri Jones, ADDI on 2023 at 2:20 PM           "

## 2023-04-30 NOTE — PROGRESS NOTES
Breakthrough bleeding on Nexplanon  Encounter for Nexplanon removal    Ms. Schaefer A Gemechu 27 year old returns for ongoing bleeding after taking OCP taper to address breakthrough bleeding from her Nexplanon. Bleeding has been daily since completing the OCP taper which initially helped to stop the bleeding, but resumed thereafter following completion of OCP taper.   She desires removal of Nexplanon, but does wan to continue with contraception.   She is leaning more towards OCP, despite expressing concern about the need for daily administration.       /88 (BP Location: Left arm, Cuff Size: Adult Regular)   Pulse 74   Wt 55.4 kg (122 lb 3.2 oz)   LMP 04/12/2023   Breastfeeding No   BMI 19.72 kg/m      General Appearance: NAD  Pelvic: deferred    Procedure: Nexplanon removal  Patient left arm was positioned and the Nexplanon by palpation. A gustavo was made at the end of the Nexplanon closest to the elbow. Site of incision was anesthetized 3 mL of 1% lidocaine without epinephrine. A 3 mm incision was made in a longitudinal direction of the arm at the tip of the implant closest to the elbow.Nexplanon was pushed gently towards the incision until the tip is visible. The implant was grasped with curved mosquito forceps and pulled out gently. Incison was bandaged and a wrap placed around the arm for hemostasis. Patient tolerated the procedure well.     Assessment/Plan:   1) Contraception counseling, Breakthrough bleeding on Nexplanon  -- We reviewed other birth control options spanning IUD options, Depo-provera, birth control pain and vaginal ring with associated risks, benefits, bleeding precautions and expectations, risks.   After discussiing all of these options, patient elects to proceed with OCPs.  -- OCPs prescribed; patient has no relative of absolute contraindications to combined OCP use including personal/family hx of VTE, migraine headaches with aura and smoking     2) Nexplanon Removal  -- precautions  given regarding wound healing (echymosis, erythema, purulent discharge, fevers) to return to clinic     Total time spent was 15 minutes on the date of the encounter in chart review, patient visit, review of tests, documentation and counseling on the above medical conditions, not including time spent on the procedure.         Julian Rodriguez MD  [facility]

## 2023-05-07 ENCOUNTER — HEALTH MAINTENANCE LETTER (OUTPATIENT)
Age: 28
End: 2023-05-07

## 2023-05-08 ENCOUNTER — THERAPY VISIT (OUTPATIENT)
Dept: PHYSICAL THERAPY | Facility: CLINIC | Age: 28
End: 2023-05-08
Payer: COMMERCIAL

## 2023-05-08 DIAGNOSIS — M54.41 CHRONIC RIGHT-SIDED LOW BACK PAIN WITH BILATERAL SCIATICA: ICD-10-CM

## 2023-05-08 DIAGNOSIS — G89.29 CHRONIC BILATERAL LOW BACK PAIN WITHOUT SCIATICA: ICD-10-CM

## 2023-05-08 DIAGNOSIS — M54.50 CHRONIC BILATERAL LOW BACK PAIN WITHOUT SCIATICA: ICD-10-CM

## 2023-05-08 DIAGNOSIS — M54.42 CHRONIC RIGHT-SIDED LOW BACK PAIN WITH BILATERAL SCIATICA: ICD-10-CM

## 2023-05-08 DIAGNOSIS — G89.29 CHRONIC RIGHT-SIDED LOW BACK PAIN WITH BILATERAL SCIATICA: ICD-10-CM

## 2023-05-08 PROCEDURE — 97161 PT EVAL LOW COMPLEX 20 MIN: CPT | Mod: GP | Performed by: PHYSICAL THERAPIST

## 2023-05-08 PROCEDURE — 97110 THERAPEUTIC EXERCISES: CPT | Mod: GP | Performed by: PHYSICAL THERAPIST

## 2023-05-08 NOTE — PROGRESS NOTES
[facility]    OUTPATIENT Physical Therapy ORTHOPEDIC EVALUATION  PLAN OF TREATMENT FOR OUTPATIENT REHABILITATION  (COMPLETE FOR INITIAL CLAIMS ONLY)  Patient's Last Name, First Name, M.I.  YOB: 1995  Olive Lima    Provider s Name:  [facility]   Medical Record No.  [id number]   Start of Care Date:  05/08/23   Onset Date:   05/08/22   Treatment Diagnosis:  LBP/B leg pain Medical Diagnosis:  Chronic bilateral low back pain without sciatica       Goals:     05/08/23 0500   Body Part   Goals listed below are for LBP   Goal #1   Goal #1 sitting   Previous Functional Level No restrictions   Current Functional Level Hours patient can sit   Performance level 1 with 8/10 pain   STG Target Performance Hours patient will be able to sit   Performance level 1 with 4/10 pain   Rationale to allow rest from standing   Due date 05/29/23   LTG Target Performance Hours patient will be able to sit   Performance Level 2 with 0/10 pain   Rationale to allow rest from standing   Due date 07/17/23   Goal #2   Goal #2 sleeping   Previous Functional Level No restrictions   Current Functional Level 2-3 hours without sleep per night   STG Target Performance 1-2 hours without sleep per night   Rationale to establish restorative sleep pattern   Due Date 05/29/23   LTG Target Performance Sleep through the night w/o meds   Rationale to establish restorative sleep pattern   Due Date 07/17/23         Therapy Frequency:  1x/wk x 3 wks; 2x/mnth x 7 weeks  Predicted Duration of Therapy Intervention:  10 weeks    Maria Victoria Tabor, PT                 I CERTIFY THE NEED FOR THESE SERVICES FURNISHED UNDER        THIS PLAN OF TREATMENT AND WHILE UNDER MY CARE     (Physician attestation of this document indicates review and certification of the therapy plan).                     Certification Date From:   05/08/23   Certification Date To:  07/17/23    Referring Provider:  Malcolm Gonzalez    Initial Assessment        See Epic Evaluation SOC Date: 05/08/23

## 2023-05-08 NOTE — PROGRESS NOTES
Physical Therapy Initial Evaluation  Subjective:  The history is provided by the patient. A  was used ().   Patient Health History  Olive Lima being seen for LBP.     Problem began: 5/8/2022.   Problem occurred: unknown   Pain is reported as 8/10 on pain scale.  General health as reported by patient is fair.  Pertinent medical history includes: none.   Red flags:  None as reported by patient.  Medical allergies: none.   Surgeries include:  None.    Current medications:  None.    Current occupation is .   Primary job tasks include:  Lifting/carrying.                  Therapist Generated HPI Evaluation  Problem details: 1+ year ago onset of central and R LBP, B knee, B ankle pain for unknown reasons. Pain with bending, lifting, and sitting. Feels burning in central and R LBP during sitting.         Type of problem:  Lumbar.    This is a chronic condition.  Condition occurred with:  Insidious onset.  Where condition occurred: for unknown reasons.  Patient reports pain:  Central lumbar spine and lumbar spine right.  Pain is described as aching and is intermittent.  Pain radiates to:  Knee left, knee right, foot left and foot right.   Since onset symptoms are unchanged.  Symptoms are exacerbated by bending, lifting and sitting  and relieved by rest.      Restrictions due to condition include:  Working in normal job without restrictions.  Barriers include:  None as reported by patient.                        Objective:  System         Lumbar/SI Evaluation  ROM:    AROM Lumbar:   Flexion:          Hands to ankles (incr LBP)  Ext:                    50% (incr LBP)   Side Bend:        Left:     Right:   Rotation:           Left:     Right:   Side Glide:        Left:  75% (LBP)    Right:  100% (LBP)                                                                           Rasta Lumbar Evaluation    Posture:  Sitting: fair  Standing: fair      Correction of Posture: better      Test  Movements:        EIL: During: increases    Repeat EIL: During: increases  After: no worse  Mechanical Response: IncROM          Principle of Treatment:  Posture Correction: yes    Extension: REIL x 10 every 2-3 hours    Other: body mechanics                                       ROS    Assessment/Plan:    Patient is a 27 year old female with lumbar complaints.    Patient has the following significant findings with corresponding treatment plan.                Diagnosis 1:  LBP/B leg pain   Pain -  manual therapy, self management, education, directional preference exercise and home program  Decreased ROM/flexibility - manual therapy and therapeutic exercise  Decreased function - therapeutic activities  Impaired posture - neuro re-education    Therapy Evaluation Codes:   Cumulative Therapy Evaluation is: Low complexity.    Previous and current functional limitations:  (See Goal Flow Sheet for this information)    Short term and Long term goals: (See Goal Flow Sheet for this information)     Communication ability:  Patient has an  for communication clarity.  Patient understood most of the visit but had  to clarify occasionally.  Treatment Explanation - The following has been discussed with the patient:   RX ordered/plan of care  Anticipated outcomes  Possible risks and side effects  This patient would benefit from PT intervention to resume normal activities.   Rehab potential is good.    Frequency:  1 X week, once daily  Duration:  for 3 weeks tapering to 2 X a month over 7 weeks  Discharge Plan:  Achieve all LTG.  Independent in home treatment program.  Reach maximal therapeutic benefit.    Please refer to the daily flowsheet for treatment today, total treatment time and time spent performing 1:1 timed codes.

## 2023-07-19 PROBLEM — G89.29 CHRONIC RIGHT-SIDED LOW BACK PAIN WITH BILATERAL SCIATICA: Status: RESOLVED | Noted: 2023-05-08 | Resolved: 2023-07-19

## 2023-07-19 PROBLEM — M54.42 CHRONIC RIGHT-SIDED LOW BACK PAIN WITH BILATERAL SCIATICA: Status: RESOLVED | Noted: 2023-05-08 | Resolved: 2023-07-19

## 2023-07-19 PROBLEM — M54.41 CHRONIC RIGHT-SIDED LOW BACK PAIN WITH BILATERAL SCIATICA: Status: RESOLVED | Noted: 2023-05-08 | Resolved: 2023-07-19

## 2023-07-25 ENCOUNTER — LAB (OUTPATIENT)
Dept: LAB | Facility: CLINIC | Age: 28
End: 2023-07-25
Payer: COMMERCIAL

## 2023-07-25 DIAGNOSIS — E55.9 VITAMIN D DEFICIENCY: ICD-10-CM

## 2023-07-25 PROCEDURE — 36415 COLL VENOUS BLD VENIPUNCTURE: CPT

## 2023-07-25 PROCEDURE — 82306 VITAMIN D 25 HYDROXY: CPT

## 2023-07-26 LAB — DEPRECATED CALCIDIOL+CALCIFEROL SERPL-MC: 55 UG/L (ref 20–75)

## 2023-10-19 ENCOUNTER — DOCUMENTATION ONLY (OUTPATIENT)
Dept: LAB | Facility: CLINIC | Age: 28
End: 2023-10-19
Payer: COMMERCIAL

## 2023-10-19 DIAGNOSIS — R53.83 OTHER FATIGUE: Primary | ICD-10-CM

## 2023-10-19 NOTE — PROGRESS NOTES
Olive Lima has an upcoming lab appointment:    Future Appointments   Date Time Provider Department Center   10/27/2023  1:45 PM [facility]     Patient is scheduled for the following lab(s): Iron and Vitamin D levels    There is no order available. Please review and place either future orders or HMPO (Review of Health Maintenance Protocol Orders), as appropriate.    Health Maintenance Due   Topic    ANNUAL REVIEW OF HM ORDERS     HEPATITIS C SCREENING      Jojo Haley

## 2023-10-23 NOTE — PROGRESS NOTES
Called patient with [employer]  and let her know that the provider ordered the requested labs.  Patient verbalized appreciation.

## 2023-10-23 NOTE — PROGRESS NOTES
Called patient thru an [employer] . Patient stated she's feeling dizzy & tired sometimes that's why she's wanting her labs rechecked. Denied having heavy bleeding and difficulty breathing.     Please advise if tests are gonna be ordered.

## 2023-10-23 NOTE — PROGRESS NOTES
Why would she need her iron checked I dont see any problem with Fe deficiency anemia in the past??

## 2023-10-27 ENCOUNTER — LAB (OUTPATIENT)
Dept: LAB | Facility: CLINIC | Age: 28
End: 2023-10-27
Payer: COMMERCIAL

## 2023-10-27 DIAGNOSIS — R53.83 OTHER FATIGUE: ICD-10-CM

## 2023-10-27 LAB
BASOPHILS # BLD AUTO: 0 10E3/UL (ref 0–0.2)
BASOPHILS NFR BLD AUTO: 1 %
EOSINOPHIL # BLD AUTO: 0.2 10E3/UL (ref 0–0.7)
EOSINOPHIL NFR BLD AUTO: 3 %
ERYTHROCYTE [DISTWIDTH] IN BLOOD BY AUTOMATED COUNT: 12.4 % (ref 10–15)
FERRITIN SERPL-MCNC: 38 NG/ML (ref 6–175)
HCT VFR BLD AUTO: 42.1 % (ref 35–47)
HGB BLD-MCNC: 14.2 G/DL (ref 11.7–15.7)
IMM GRANULOCYTES # BLD: 0 10E3/UL
IMM GRANULOCYTES NFR BLD: 0 %
IRON BINDING CAPACITY (ROCHE): 283 UG/DL (ref 240–430)
IRON SATN MFR SERPL: 29 % (ref 15–46)
IRON SERPL-MCNC: 83 UG/DL (ref 37–145)
LYMPHOCYTES # BLD AUTO: 2.1 10E3/UL (ref 0.8–5.3)
LYMPHOCYTES NFR BLD AUTO: 43 %
MCH RBC QN AUTO: 27.5 PG (ref 26.5–33)
MCHC RBC AUTO-ENTMCNC: 33.7 G/DL (ref 31.5–36.5)
MCV RBC AUTO: 82 FL (ref 78–100)
MONOCYTES # BLD AUTO: 0.3 10E3/UL (ref 0–1.3)
MONOCYTES NFR BLD AUTO: 5 %
NEUTROPHILS # BLD AUTO: 2.3 10E3/UL (ref 1.6–8.3)
NEUTROPHILS NFR BLD AUTO: 47 %
PLATELET # BLD AUTO: 239 10E3/UL (ref 150–450)
RBC # BLD AUTO: 5.16 10E6/UL (ref 3.8–5.2)
VIT D+METAB SERPL-MCNC: 30 NG/ML (ref 20–50)
WBC # BLD AUTO: 4.8 10E3/UL (ref 4–11)

## 2023-10-27 PROCEDURE — 85025 COMPLETE CBC W/AUTO DIFF WBC: CPT

## 2023-10-27 PROCEDURE — 36415 COLL VENOUS BLD VENIPUNCTURE: CPT

## 2023-10-27 PROCEDURE — 82306 VITAMIN D 25 HYDROXY: CPT

## 2023-10-27 PROCEDURE — 83550 IRON BINDING TEST: CPT

## 2023-10-27 PROCEDURE — 83540 ASSAY OF IRON: CPT

## 2023-10-27 PROCEDURE — 82728 ASSAY OF FERRITIN: CPT

## 2023-11-11 ENCOUNTER — APPOINTMENT (OUTPATIENT)
Dept: GENERAL RADIOLOGY | Facility: CLINIC | Age: 28
End: 2023-11-11
Attending: EMERGENCY MEDICINE
Payer: COMMERCIAL

## 2023-11-11 ENCOUNTER — HOSPITAL ENCOUNTER (EMERGENCY)
Facility: CLINIC | Age: 28
Discharge: HOME | End: 2023-11-11
Attending: EMERGENCY MEDICINE | Admitting: EMERGENCY MEDICINE
Payer: COMMERCIAL

## 2023-11-11 VITALS
WEIGHT: 120.15 LBS | SYSTOLIC BLOOD PRESSURE: 148 MMHG | RESPIRATION RATE: 16 BRPM | OXYGEN SATURATION: 100 % | DIASTOLIC BLOOD PRESSURE: 97 MMHG | HEART RATE: 69 BPM | BODY MASS INDEX: 19.39 KG/M2 | TEMPERATURE: 98.4 F

## 2023-11-11 DIAGNOSIS — S40.012A CONTUSION OF LEFT SHOULDER, INITIAL ENCOUNTER: ICD-10-CM

## 2023-11-11 DIAGNOSIS — S50.02XA CONTUSION OF LEFT ELBOW, INITIAL ENCOUNTER: ICD-10-CM

## 2023-11-11 DIAGNOSIS — S70.12XA CONTUSION OF LEFT THIGH, INITIAL ENCOUNTER: ICD-10-CM

## 2023-11-11 PROCEDURE — 73030 X-RAY EXAM OF SHOULDER: CPT | Mod: LT

## 2023-11-11 PROCEDURE — 73552 X-RAY EXAM OF FEMUR 2/>: CPT | Mod: LT

## 2023-11-11 PROCEDURE — 250N000013 HC RX MED GY IP 250 OP 250 PS 637: Performed by: EMERGENCY MEDICINE

## 2023-11-11 PROCEDURE — 73562 X-RAY EXAM OF KNEE 3: CPT | Mod: LT

## 2023-11-11 PROCEDURE — 73060 X-RAY EXAM OF HUMERUS: CPT | Mod: LT

## 2023-11-11 PROCEDURE — 99285 EMERGENCY DEPT VISIT HI MDM: CPT

## 2023-11-11 PROCEDURE — 73080 X-RAY EXAM OF ELBOW: CPT | Mod: LT

## 2023-11-11 PROCEDURE — 72170 X-RAY EXAM OF PELVIS: CPT

## 2023-11-11 RX ORDER — ACETAMINOPHEN 500 MG
500-1000 TABLET ORAL EVERY 6 HOURS PRN
Qty: 60 TABLET | Refills: 0 | Status: SHIPPED | OUTPATIENT
Start: 2023-11-11

## 2023-11-11 RX ORDER — ACETAMINOPHEN 325 MG/1
975 TABLET ORAL ONCE
Status: COMPLETED | OUTPATIENT
Start: 2023-11-11 | End: 2023-11-11

## 2023-11-11 RX ADMIN — ACETAMINOPHEN 975 MG: 325 TABLET, FILM COATED ORAL at 18:28

## 2023-11-11 ASSESSMENT — ACTIVITIES OF DAILY LIVING (ADL): ADLS_ACUITY_SCORE: 35

## 2023-11-11 NOTE — LETTER
November 11, 2023      To Whom It May Concern:      Olive Lima was seen in our Emergency Department today, 11/11/23.  I expect her condition to improve over the next 3 days.  She may return to work/school when improved.    Sincerely,        Mckenna Weinstein RN
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
## 2023-11-12 NOTE — ED NOTES
Pt discharged with  and 2 children.  Pt states she feels sore but ready to go home.  No further questions at this time. Pt was able to ambulate to the ED lobby without issue.
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
## 2024-03-04 ENCOUNTER — TELEPHONE (OUTPATIENT)
Dept: OBGYN | Facility: CLINIC | Age: 29
End: 2024-03-04
Payer: COMMERCIAL

## 2024-03-04 DIAGNOSIS — Z30.011 ENCOUNTER FOR INITIAL PRESCRIPTION OF CONTRACEPTIVE PILLS: ICD-10-CM

## 2024-03-04 RX ORDER — LEVONORGESTREL/ETHIN.ESTRADIOL 0.1-0.02MG
1 TABLET ORAL DAILY
Qty: 84 TABLET | Refills: 0 | Status: SHIPPED | OUTPATIENT
Start: 2024-03-04 | End: 2024-04-22

## 2024-03-04 NOTE — TELEPHONE ENCOUNTER
[facility] Call Center    Phone Message    May a detailed message be left on voicemail: yes     Reason for Call: Medication Refill Request    Has the patient contacted the pharmacy for the refill? Yes   Name of medication being requested: etonogestrel (NEXPLANON) 68 MG I   Provider who prescribed the medication: Pt provider is no longer at clinic  Pharmacy: [id number] [place], [place] - [address] AT [facility] [address]    Date medication is needed: ASAP     Action Taken: Message routed to:  Other: SOPHIA TAPIA    Travel Screening: Not Applicable

## 2024-03-04 NOTE — TELEPHONE ENCOUNTER
Returned call to patient with the assistance of an [employer] .    Patient is requesting refill of oral birth control (not nexplanon).  Reviewed with patient that she will require an appointment sometime in the next 3 months for any future refills.    Scheduled with Dr. Gardner for 4/22/24.  Short term refill sent.    Louise GIRARD RN

## 2024-04-02 ENCOUNTER — APPOINTMENT (OUTPATIENT)
Dept: CT IMAGING | Facility: CLINIC | Age: 29
End: 2024-04-02
Attending: EMERGENCY MEDICINE
Payer: COMMERCIAL

## 2024-04-02 ENCOUNTER — VIRTUAL VISIT (OUTPATIENT)
Dept: INTERPRETER SERVICES | Facility: CLINIC | Age: 29
End: 2024-04-02

## 2024-04-02 ENCOUNTER — HOSPITAL ENCOUNTER (EMERGENCY)
Facility: CLINIC | Age: 29
Discharge: HOME | End: 2024-04-02
Attending: EMERGENCY MEDICINE | Admitting: EMERGENCY MEDICINE
Payer: COMMERCIAL

## 2024-04-02 ENCOUNTER — APPOINTMENT (OUTPATIENT)
Dept: ULTRASOUND IMAGING | Facility: CLINIC | Age: 29
End: 2024-04-02
Attending: EMERGENCY MEDICINE
Payer: COMMERCIAL

## 2024-04-02 ENCOUNTER — OFFICE VISIT (OUTPATIENT)
Dept: URGENT CARE | Facility: URGENT CARE | Age: 29
End: 2024-04-02
Payer: COMMERCIAL

## 2024-04-02 VITALS
DIASTOLIC BLOOD PRESSURE: 81 MMHG | BODY MASS INDEX: 18.43 KG/M2 | WEIGHT: 114.2 LBS | TEMPERATURE: 98.3 F | SYSTOLIC BLOOD PRESSURE: 116 MMHG | HEART RATE: 69 BPM | OXYGEN SATURATION: 99 % | RESPIRATION RATE: 18 BRPM

## 2024-04-02 VITALS
BODY MASS INDEX: 18.43 KG/M2 | WEIGHT: 114.2 LBS | RESPIRATION RATE: 16 BRPM | TEMPERATURE: 97.6 F | HEART RATE: 60 BPM | DIASTOLIC BLOOD PRESSURE: 78 MMHG | OXYGEN SATURATION: 100 % | SYSTOLIC BLOOD PRESSURE: 125 MMHG

## 2024-04-02 DIAGNOSIS — R10.32 ABDOMINAL PAIN, LEFT LOWER QUADRANT: Primary | ICD-10-CM

## 2024-04-02 DIAGNOSIS — B37.31 CANDIDIASIS OF VAGINA: ICD-10-CM

## 2024-04-02 DIAGNOSIS — R10.9 LEFT FLANK PAIN: ICD-10-CM

## 2024-04-02 DIAGNOSIS — K59.00 CONSTIPATION, UNSPECIFIED CONSTIPATION TYPE: ICD-10-CM

## 2024-04-02 DIAGNOSIS — R10.32 ABDOMINAL PAIN, LEFT LOWER QUADRANT: ICD-10-CM

## 2024-04-02 DIAGNOSIS — R31.29 MICROSCOPIC HEMATURIA: ICD-10-CM

## 2024-04-02 LAB
ALBUMIN UR-MCNC: NEGATIVE MG/DL
ALBUMIN UR-MCNC: NEGATIVE MG/DL
ANION GAP SERPL CALCULATED.3IONS-SCNC: 9 MMOL/L (ref 7–15)
APPEARANCE UR: CLEAR
APPEARANCE UR: CLEAR
BACTERIA #/AREA URNS HPF: ABNORMAL /HPF
BASOPHILS # BLD AUTO: 0 10E3/UL (ref 0–0.2)
BASOPHILS NFR BLD AUTO: 1 %
BILIRUB UR QL STRIP: NEGATIVE
BILIRUB UR QL STRIP: NEGATIVE
BUN SERPL-MCNC: 8.9 MG/DL (ref 6–20)
CALCIUM SERPL-MCNC: 9.3 MG/DL (ref 8.6–10)
CHLORIDE SERPL-SCNC: 105 MMOL/L (ref 98–107)
CLUE CELLS: ABNORMAL
COLOR UR AUTO: ABNORMAL
COLOR UR AUTO: YELLOW
CREAT SERPL-MCNC: 0.65 MG/DL (ref 0.51–0.95)
DEPRECATED HCO3 PLAS-SCNC: 24 MMOL/L (ref 22–29)
EGFRCR SERPLBLD CKD-EPI 2021: >90 ML/MIN/1.73M2
EOSINOPHIL # BLD AUTO: 0.1 10E3/UL (ref 0–0.7)
EOSINOPHIL NFR BLD AUTO: 3 %
ERYTHROCYTE [DISTWIDTH] IN BLOOD BY AUTOMATED COUNT: 12.4 % (ref 10–15)
FLUAV RNA SPEC QL NAA+PROBE: NEGATIVE
FLUBV RNA RESP QL NAA+PROBE: NEGATIVE
GLUCOSE SERPL-MCNC: 98 MG/DL (ref 70–99)
GLUCOSE UR STRIP-MCNC: NEGATIVE MG/DL
GLUCOSE UR STRIP-MCNC: NEGATIVE MG/DL
HCG UR QL: NEGATIVE
HCT VFR BLD AUTO: 43.3 % (ref 35–47)
HGB BLD-MCNC: 14.8 G/DL (ref 11.7–15.7)
HGB UR QL STRIP: ABNORMAL
HGB UR QL STRIP: ABNORMAL
IMM GRANULOCYTES # BLD: 0 10E3/UL
IMM GRANULOCYTES NFR BLD: 0 %
KETONES UR STRIP-MCNC: NEGATIVE MG/DL
KETONES UR STRIP-MCNC: NEGATIVE MG/DL
LEUKOCYTE ESTERASE UR QL STRIP: NEGATIVE
LEUKOCYTE ESTERASE UR QL STRIP: NEGATIVE
LYMPHOCYTES # BLD AUTO: 2.2 10E3/UL (ref 0.8–5.3)
LYMPHOCYTES NFR BLD AUTO: 50 %
MCH RBC QN AUTO: 28.6 PG (ref 26.5–33)
MCHC RBC AUTO-ENTMCNC: 34.2 G/DL (ref 31.5–36.5)
MCV RBC AUTO: 84 FL (ref 78–100)
MONOCYTES # BLD AUTO: 0.3 10E3/UL (ref 0–1.3)
MONOCYTES NFR BLD AUTO: 7 %
MUCOUS THREADS #/AREA URNS LPF: PRESENT /LPF
NEUTROPHILS # BLD AUTO: 1.8 10E3/UL (ref 1.6–8.3)
NEUTROPHILS NFR BLD AUTO: 39 %
NITRATE UR QL: NEGATIVE
NITRATE UR QL: NEGATIVE
NRBC # BLD AUTO: 0 10E3/UL
NRBC BLD AUTO-RTO: 0 /100
PH UR STRIP: 5.5 [PH] (ref 5–7)
PH UR STRIP: 6.5 [PH] (ref 5–7)
PLATELET # BLD AUTO: 199 10E3/UL (ref 150–450)
POTASSIUM SERPL-SCNC: 3.9 MMOL/L (ref 3.4–5.3)
RBC # BLD AUTO: 5.18 10E6/UL (ref 3.8–5.2)
RBC #/AREA URNS AUTO: ABNORMAL /HPF
RBC URINE: 5 /HPF
RSV RNA SPEC NAA+PROBE: NEGATIVE
SARS-COV-2 RNA RESP QL NAA+PROBE: NEGATIVE
SODIUM SERPL-SCNC: 138 MMOL/L (ref 135–145)
SP GR UR STRIP: 1.02 (ref 1–1.03)
SP GR UR STRIP: 1.02 (ref 1–1.03)
SQUAMOUS #/AREA URNS AUTO: ABNORMAL /LPF
SQUAMOUS EPITHELIAL: 1 /HPF
TRICHOMONAS, WET PREP: ABNORMAL
UROBILINOGEN UR STRIP-ACNC: 0.2 E.U./DL
UROBILINOGEN UR STRIP-MCNC: NORMAL MG/DL
WBC # BLD AUTO: 4.5 10E3/UL (ref 4–11)
WBC #/AREA URNS AUTO: ABNORMAL /HPF
WBC URINE: 1 /HPF
WBC'S/HIGH POWER FIELD, WET PREP: ABNORMAL
YEAST #/AREA URNS HPF: ABNORMAL /HPF
YEAST, WET PREP: PRESENT

## 2024-04-02 PROCEDURE — 74176 CT ABD & PELVIS W/O CONTRAST: CPT

## 2024-04-02 PROCEDURE — 87210 SMEAR WET MOUNT SALINE/INK: CPT | Performed by: FAMILY MEDICINE

## 2024-04-02 PROCEDURE — 36415 COLL VENOUS BLD VENIPUNCTURE: CPT | Performed by: EMERGENCY MEDICINE

## 2024-04-02 PROCEDURE — 87637 SARSCOV2&INF A&B&RSV AMP PRB: CPT | Performed by: EMERGENCY MEDICINE

## 2024-04-02 PROCEDURE — 85004 AUTOMATED DIFF WBC COUNT: CPT | Performed by: EMERGENCY MEDICINE

## 2024-04-02 PROCEDURE — 99214 OFFICE O/P EST MOD 30 MIN: CPT | Performed by: FAMILY MEDICINE

## 2024-04-02 PROCEDURE — 99284 EMERGENCY DEPT VISIT MOD MDM: CPT | Mod: 25

## 2024-04-02 PROCEDURE — 81001 URINALYSIS AUTO W/SCOPE: CPT | Performed by: FAMILY MEDICINE

## 2024-04-02 PROCEDURE — 81025 URINE PREGNANCY TEST: CPT | Performed by: EMERGENCY MEDICINE

## 2024-04-02 PROCEDURE — T1013 SIGN LANG/ORAL INTERPRETER: HCPCS | Mod: U4,TEL,95

## 2024-04-02 PROCEDURE — 80048 BASIC METABOLIC PNL TOTAL CA: CPT | Performed by: EMERGENCY MEDICINE

## 2024-04-02 PROCEDURE — 76830 TRANSVAGINAL US NON-OB: CPT

## 2024-04-02 PROCEDURE — 81001 URINALYSIS AUTO W/SCOPE: CPT | Performed by: EMERGENCY MEDICINE

## 2024-04-02 RX ORDER — MICONAZOLE NITRATE 20 MG/G
CREAM TOPICAL 2 TIMES DAILY
Qty: 30 G | Refills: 0 | Status: SHIPPED | OUTPATIENT
Start: 2024-04-02 | End: 2024-04-09

## 2024-04-02 RX ORDER — POLYETHYLENE GLYCOL 3350 17 G/17G
1 POWDER, FOR SOLUTION ORAL DAILY
Qty: 527 G | Refills: 0 | Status: SHIPPED | OUTPATIENT
Start: 2024-04-02 | End: 2024-05-02

## 2024-04-02 ASSESSMENT — COLUMBIA-SUICIDE SEVERITY RATING SCALE - C-SSRS
1. IN THE PAST MONTH, HAVE YOU WISHED YOU WERE DEAD OR WISHED YOU COULD GO TO SLEEP AND NOT WAKE UP?: NO
6. HAVE YOU EVER DONE ANYTHING, STARTED TO DO ANYTHING, OR PREPARED TO DO ANYTHING TO END YOUR LIFE?: NO
2. HAVE YOU ACTUALLY HAD ANY THOUGHTS OF KILLING YOURSELF IN THE PAST MONTH?: NO

## 2024-04-02 ASSESSMENT — ACTIVITIES OF DAILY LIVING (ADL)
ADLS_ACUITY_SCORE: 35

## 2024-04-02 NOTE — PROGRESS NOTES
SUBJECTIVE: Olive Lima is a 28 year old female presenting with a chief complaint of left flank, LLQ abd pain.  Onset of symptoms was 2 week(s) ago.    Past Medical History:   Diagnosis Date    Hyperemesis of pregnancy     Vaginal delivery 7/2/2021     No Known Allergies  Social History     Tobacco Use    Smoking status: Never    Smokeless tobacco: Never   Substance Use Topics    Alcohol use: Never       ROS:  SKIN: no rash  GI: no vomiting    OBJECTIVE:  /78   Pulse 60   Temp 97.6  F (36.4  C) (Tympanic)   Resp 16   Wt 51.8 kg (114 lb 3.2 oz)   SpO2 100%   BMI 18.43 kg/m  GENERAL APPEARANCE: healthy, alert and no distress  ABDOMEN: tenderness moderate LLQ  SKIN: no suspicious lesions or rashes  Left flank pain      ICD-10-CM    1. Abdominal pain, left lower quadrant  R10.32 UA Macroscopic with reflex to Microscopic and Culture - Clinic Collect     Wet prep - Clinic Collect     UA Microscopic with Reflex to Culture      2. Lower back pain  M54.50 Wet prep - Clinic Collect      3. Microscopic hematuria  R31.29         Pt will go through ED for left flank LLQ abd pain with hematuria for 2 weeks

## 2024-04-02 NOTE — ED TRIAGE NOTES
Presents to triage with c/o LLQ abdominal pain, L flank pain and hematuria that started about 2 weeks ago. Also endorses generalized bilateral leg pain.

## 2024-04-02 NOTE — DISCHARGE INSTRUCTIONS
Follow-up with your primary care clinic in 1 to 2 weeks.    Please complete the medications prescribed.    Return to the ER for worsening pain, fever, vomiting, or any new concerns.

## 2024-04-02 NOTE — ED PROVIDER NOTES
History     Chief Complaint:  Abdominal Pain, Flank Pain, and Hematuria       The history is provided by the patient. A  was used ("[employer]      Olive Lima is a 28 year old female who presents with abdominal pain, flank pain, and hematuria. Patient reports for the last 2 weeks, she has had intermittent left lower quadrant abdominal pain, hematuria, and flank pain which is worse on the left side. This worsens when she lies down on her left side. Initially, she thought this was related to her menstrual cycle, but it did not go away, and started to worsen recently. She has never had these symptoms prior to two weeks ago. Endorses constipation once in a while. Denies fever, congestion, cough, vaginal discharge, or symptoms of infection. Notes some numbness and weakness in both legs at times, but states this is unrelated. Denies injury to back. Patient is on birth control. Denies history of kidney stone, ovarian cyst, or any abdominal surgery.     Review of External Notes:   Urgent care documentation reviewed from earlier today when the patient was seen for abdominal pain and referred to the ED.    Medications:    Etonogestrel  Levonorgestrel ethinyl estradiol     Past Medical History:    Patient denies past medical history.    Physical Exam   Patient Vitals for the past 24 hrs:   BP Temp Temp src Pulse Resp SpO2 Weight   04/02/24 1406 (!) 151/89 98.3  F (36.8  C) Temporal 64 18 100 % 51.8 kg (114 lb 3.2 oz)        Physical Exam  Constitutional:       General: She is not in acute distress.     Appearance: Normal appearance. She is not diaphoretic.   HENT:      Head: Atraumatic.      Mouth/Throat:      Mouth: Mucous membranes are moist.   Eyes:      General: No scleral icterus.     Conjunctiva/sclera: Conjunctivae normal.   Cardiovascular:      Rate and Rhythm: Normal rate and regular rhythm.      Heart sounds: Normal heart sounds.   Pulmonary:      Effort: No respiratory distress.      Breath  sounds: Normal breath sounds.   Abdominal:      General: Abdomen is flat. There is no distension.      Tenderness: There is no abdominal tenderness. There is no right CVA tenderness or left CVA tenderness.   Musculoskeletal:      Cervical back: Neck supple.   Skin:     General: Skin is warm.      Capillary Refill: Capillary refill takes less than 2 seconds.      Findings: No rash.   Neurological:      General: No focal deficit present.      Mental Status: She is alert and oriented to person, place, and time.   Psychiatric:         Mood and Affect: Mood normal.         Behavior: Behavior normal.       Emergency Department Course   Imaging:  CT Abdomen Pelvis w/o Contrast   Final Result   IMPRESSION:    1.  No acute findings in the abdomen and pelvis.   2.  Large amount of stool throughout the colon.      US Pelvis Cmplt w Transvag & Doppler LmtPel Duplex Limited   Final Result   IMPRESSION:   1.  Normal pelvic ultrasound without visualized explanation for   patient's pain.   2.  No sonographic evidence of ovarian torsion.      SHAYAN WOODS MD            SYSTEM ID:  [id number]         Laboratory:  Labs Ordered and Resulted from Time of ED Arrival to Time of ED Departure   ROUTINE UA WITH MICROSCOPIC REFLEX TO CULTURE - Abnormal       Result Value    Color Urine Light Yellow      Appearance Urine Clear      Glucose Urine Negative      Bilirubin Urine Negative      Ketones Urine Negative      Specific Gravity Urine 1.017      Blood Urine Small (*)     pH Urine 5.5      Protein Albumin Urine Negative      Urobilinogen Urine Normal      Nitrite Urine Negative      Leukocyte Esterase Urine Negative      Mucus Urine Present (*)     RBC Urine 5 (*)     WBC Urine 1      Squamous Epithelials Urine 1     INFLUENZA A/B, RSV, & SARS-COV2 PCR - Normal    Influenza A PCR Negative      Influenza B PCR Negative      RSV PCR Negative      SARS CoV2 PCR Negative     HCG QUALITATIVE URINE - Normal    hCG Urine Qualitative Negative      BASIC METABOLIC PANEL - Normal    Sodium 138      Potassium 3.9      Chloride 105      Carbon Dioxide (CO2) 24      Anion Gap 9      Urea Nitrogen 8.9      Creatinine 0.65      GFR Estimate >90      Calcium 9.3      Glucose 98     CBC WITH PLATELETS AND DIFFERENTIAL    WBC Count 4.5      RBC Count 5.18      Hemoglobin 14.8      Hematocrit 43.3      MCV 84      MCH 28.6      MCHC 34.2      RDW 12.4      Platelet Count 199      % Neutrophils 39      % Lymphocytes 50      % Monocytes 7      % Eosinophils 3      % Basophils 1      % Immature Granulocytes 0      NRBCs per 100 WBC 0      Absolute Neutrophils 1.8      Absolute Lymphocytes 2.2      Absolute Monocytes 0.3      Absolute Eosinophils 0.1      Absolute Basophils 0.0      Absolute Immature Granulocytes 0.0      Absolute NRBCs 0.0        Emergency Department Course & Assessments:    Assessments/Consultations/Discussion of Management or Tests:   ED Course as of 04/02/24 1750   Tue Apr 02, 2024   1413 I evaluated the patient and obtained history as noted above.     Disposition:  The patient was discharged.     Impression & Plan    Medical Decision Making:  This patient is a 28-year-old who presents to the emergency department for evaluation of left-sided abdominal pain.  Broad differential diagnosis was considered including ovarian torsion, ruptured ovarian cyst, pyelonephritis, renal stone, constipation, bowel obstruction, musculoskeletal pain.    Ultrasound unremarkable without evidence of ovarian torsion or cysts.  She had a CT scan without evidence of ureteral stone.  She is significantly constipated which is likely contributing to her left-sided pain.  She says she has a history of constipation years ago when she was pregnant is not currently treated.  She will do a 2-week course of MiraLAX to begin with and then recheck through her primary clinic.    Patient was referred from another clinic where she had pelvic swab sent.  She does have yeast but is negative  for clue cells.  She will be treated for vaginal candidiasis.    The patient is comfortable at rest and appropriate for discharge.    Diagnosis:    ICD-10-CM    1. Abdominal pain, left lower quadrant  R10.32       2. Constipation, unspecified constipation type  K59.00       3. Candidiasis of vagina  B37.31            Discharge Medications:  New Prescriptions    MICONAZOLE (MICATIN) 2 % EXTERNAL CREAM    Apply topically 2 times daily for 7 days    POLYETHYLENE GLYCOL (MIRALAX) 17 GM/DOSE POWDER    Take 17 g (1 Capful) by mouth daily for 30 days      Scribe Disclosure:  Nidhi GARRETT, am serving as a scribe at 2:24 PM on 4/2/2024 to document services personally performed by Ayden Voss MD based on my observations and the provider's statements to me.    4/2/2024   Ayden Voss MD McRoberts, Sean Edward, MD  04/02/24 2491

## 2024-04-22 ENCOUNTER — OFFICE VISIT (OUTPATIENT)
Dept: OBGYN | Facility: CLINIC | Age: 29
End: 2024-04-22
Payer: COMMERCIAL

## 2024-04-22 VITALS — WEIGHT: 113.1 LBS | BODY MASS INDEX: 18.25 KG/M2 | DIASTOLIC BLOOD PRESSURE: 82 MMHG | SYSTOLIC BLOOD PRESSURE: 112 MMHG

## 2024-04-22 DIAGNOSIS — Z30.011 ENCOUNTER FOR INITIAL PRESCRIPTION OF CONTRACEPTIVE PILLS: ICD-10-CM

## 2024-04-22 DIAGNOSIS — Z11.3 SCREEN FOR STD (SEXUALLY TRANSMITTED DISEASE): ICD-10-CM

## 2024-04-22 DIAGNOSIS — Z23 ENCOUNTER FOR VACCINATION: ICD-10-CM

## 2024-04-22 DIAGNOSIS — Z01.419 ENCOUNTER FOR GYNECOLOGICAL EXAMINATION WITHOUT ABNORMAL FINDING: Primary | ICD-10-CM

## 2024-04-22 DIAGNOSIS — Z12.4 SCREENING FOR CERVICAL CANCER: ICD-10-CM

## 2024-04-22 DIAGNOSIS — Z30.41 ENCOUNTER FOR BIRTH CONTROL PILLS MAINTENANCE: ICD-10-CM

## 2024-04-22 PROCEDURE — 99459 PELVIC EXAMINATION: CPT | Performed by: STUDENT IN AN ORGANIZED HEALTH CARE EDUCATION/TRAINING PROGRAM

## 2024-04-22 PROCEDURE — 90651 9VHPV VACCINE 2/3 DOSE IM: CPT | Performed by: STUDENT IN AN ORGANIZED HEALTH CARE EDUCATION/TRAINING PROGRAM

## 2024-04-22 PROCEDURE — 90471 IMMUNIZATION ADMIN: CPT | Performed by: STUDENT IN AN ORGANIZED HEALTH CARE EDUCATION/TRAINING PROGRAM

## 2024-04-22 PROCEDURE — 87591 N.GONORRHOEAE DNA AMP PROB: CPT | Performed by: STUDENT IN AN ORGANIZED HEALTH CARE EDUCATION/TRAINING PROGRAM

## 2024-04-22 PROCEDURE — 99395 PREV VISIT EST AGE 18-39: CPT | Mod: 25 | Performed by: STUDENT IN AN ORGANIZED HEALTH CARE EDUCATION/TRAINING PROGRAM

## 2024-04-22 PROCEDURE — G0145 SCR C/V CYTO,THINLAYER,RESCR: HCPCS | Performed by: STUDENT IN AN ORGANIZED HEALTH CARE EDUCATION/TRAINING PROGRAM

## 2024-04-22 PROCEDURE — 87491 CHLMYD TRACH DNA AMP PROBE: CPT | Performed by: STUDENT IN AN ORGANIZED HEALTH CARE EDUCATION/TRAINING PROGRAM

## 2024-04-22 RX ORDER — LEVONORGESTREL/ETHIN.ESTRADIOL 0.1-0.02MG
1 TABLET ORAL DAILY
Qty: 84 TABLET | Refills: 0 | Status: SHIPPED | OUTPATIENT
Start: 2024-04-22 | End: 2024-04-22

## 2024-04-22 RX ORDER — LEVONORGESTREL/ETHIN.ESTRADIOL 0.1-0.02MG
1 TABLET ORAL DAILY
Qty: 84 TABLET | Refills: 4 | Status: SHIPPED | OUTPATIENT
Start: 2024-04-22

## 2024-04-22 NOTE — NURSING NOTE
"Chief Complaint   Patient presents with    Gyn Exam     Pelvic  & Breast exam   Pap due   2019  NIL   Refill OCP's          Initial /82 (BP Location: Right arm, Cuff Size: Adult Regular)   Wt 51.3 kg (113 lb 1.6 oz)   LMP 04/15/2024 (Approximate)   BMI 18.25 kg/m   Estimated body mass index is 18.25 kg/m  as calculated from the following:    Height as of 10/23/19: 1.676 m (5' 6\").    Weight as of this encounter: 51.3 kg (113 lb 1.6 oz).  BP completed using cuff size: regular    Questioned patient about current smoking habits.  Pt. has never smoked.          The following HM Due: pap smear      Terri Jones CMA on 2024 at 10:10 AM         "

## 2024-04-22 NOTE — PROGRESS NOTES
DMIT[facility]  Annual Health Maintenance Visit  Date: 2024    CC: Annual exam    ** [employer] iPad  used due to language barrier **    HPI:  Olive Lima is a 28 year old  who is here for annual exam.      Concerns today:  - No major concerns today.    Preventative Health Assessment:  Tobacco use: No  Alcohol use: No  Drug use: No  Abuse: Denies physical, sexual, or verbal abuse. Feels safe in current relationship  Depression screening: Negative    Ob/Gyn History:    Patient's last menstrual period was 04/15/2024 (approximate)..   Menses are regular/monthly, start when she starts placebo pills, Bleeding lasts for 7-10 days, moderate. No missed periods, bleeding between periods, bleeding after intercourse. Bleeding with COCP is darker and little sticky which concerns her  Dysmenorrhea: Sometimes  Sexually Active: Yes  Sexual Partner: 1 male partner (BF)  Dyspareunia: No  Contraception: COCP  Discharge: None bothersome, no itching  Last pap smear: . Result: NILM  History of STDs: No  Incontinence: No    Estrogen/COCP Exclusion Criteria  Smoking over 35 - No  Migraine with aura - No  Liver Disease - No  Hypertension, uncontrolled - No. 2 BP in the past that were elevated but iso of being in ED in pain  VTE - No  Stroke - No  Ischemic heart disease - No  Thrombogenic mutations - No  Breast cancer - No  Endometrial cancer - No  Contraindications to Estrogen: NO    Health Maintenance:  Last pap smear: See above    Immunizations:  Influenza: No  COVID: No  HPV: No    PMH:  Past Medical History:   Diagnosis Date    Hyperemesis of pregnancy     Vaginal delivery 2021       Prob List:  Patient Active Problem List   Diagnosis    Nexplanon insertion       PSH:  Past Surgical History:   Procedure Laterality Date    NO HISTORY OF SURGERY         Allergies:  No Known Allergies    Medications:  Current Outpatient Medications   Medication Sig Dispense Refill     levonorgestrel-ethinyl estradiol (AVIANE) 0.1-20 MG-MCG tablet Take 1 tablet by mouth daily 84 tablet 0    polyethylene glycol (MIRALAX) 17 GM/Dose powder Take 17 g (1 Capful) by mouth daily for 30 days 527 g 0    acetaminophen (TYLENOL) 500 MG tablet Take 1-2 tablets (500-1,000 mg) by mouth every 6 hours as needed for mild pain (Patient not taking: Reported on 4/2/2024) 60 tablet 0    etonogestrel (NEXPLANON) 68 MG IMPL 1 each (68 mg) by Subdermal route once (Patient not taking: Reported on 4/22/2024)      vitamin D3 (CHOLECALCIFEROL) 1.25 MG (72581 UT) capsule Take 1 capsule (50,000 Units) by mouth every 7 days Do not take while breastfeeding (Patient not taking: Reported on 4/2/2024) 8 capsule 0     No current facility-administered medications for this visit.       FH:  No family history on file.    SH:  Social History     Tobacco Use    Smoking status: Never    Smokeless tobacco: Never   Vaping Use    Vaping status: Never Used   Substance Use Topics    Alcohol use: Never    Drug use: Never       History   Smoking Status    Never   Smokeless Tobacco    Never       Review of systems:  With the exception of any items noted above, the 10 point ROS was negative.    O:  /82 (BP Location: Right arm, Cuff Size: Adult Regular)   Wt 51.3 kg (113 lb 1.6 oz)   LMP 04/15/2024 (Approximate)   BMI 18.25 kg/m    GEN: Alert, oriented x3, NAD  HEENT: Atraumatic/normocephalic, pupils mid-sized, MMM  NECK: Normal ROM, no thyromegaly or masses  CV: RRR, no murmurs or gallops appreciated  PULM: Normal work of breathing, chest clear with equal lung sounds bilaterally, no wheezes or crackles  BREAST: Bilaterally without focal masses, lesions, dimpling, rash; no axillary lymphadenopathy, no expressible nipple discharge. Exam chaperoned by Terri Jones MA  ABD: Soft, non-distended, and non-tender without guarding or rebound  : Normal external female genitalia with normal [place]'s, Bartholin's Gland, and urethral meatus.  Vagina is without lesions. Physiologic discharge seen. Vaginal walls estrogenized and rugated. Bladder is nontender. Cervix normal appearing, multiparous. On bimanual the uterus is small, mobile. No cervical motion tenderness. No adnexal masses or tenderness. Pap smear collected. Exam chaperoned by Terri Jones MA.  EXT: Normal extremities, grossly normal ROM  SKIN: Warm and dry, skin color normal  NEURO: Speech clear, CNs grossly intact, moves all extremities appropriately  PSYCH: Appropriate affect    A/P:  Olive Lima is a 28 year old  who presents for annual exam.   Age appropriate counseling.  Other issues addressed today as below.    #Screening  - GC/CT: Collected  - Cervical cancer: Pap smear/cotest collected  - Clinical breast exam: Unremarkable    #Immunizations  - Influenza - Not received  - COVID - No  - HPV - Start series today    #Contraception  - COCP refilled today x1 year  - 2 elevated BP in the last year. Both in the context of first set if vitals at an ED visit for pain. Would not consider this HTN and an exclusion for COCP    Follow up in 1 year, sooner if questions or concerns.    Gurdeep Gardner MD MPH  [facility]  2024 10:28 AM

## 2024-04-23 LAB
C TRACH DNA SPEC QL NAA+PROBE: NEGATIVE
N GONORRHOEA DNA SPEC QL NAA+PROBE: NEGATIVE

## 2024-04-25 LAB
BKR LAB AP GYN ADEQUACY: NORMAL
BKR LAB AP GYN INTERPRETATION: NORMAL
BKR LAB AP HPV REFLEX: NORMAL
BKR LAB AP LMP: NORMAL
BKR LAB AP PREVIOUS ABNORMAL: NORMAL
PATH REPORT.COMMENTS IMP SPEC: NORMAL
PATH REPORT.COMMENTS IMP SPEC: NORMAL
PATH REPORT.RELEVANT HX SPEC: NORMAL

## 2024-05-10 ENCOUNTER — NURSE TRIAGE (OUTPATIENT)
Dept: FAMILY MEDICINE | Facility: CLINIC | Age: 29
End: 2024-05-10
Payer: COMMERCIAL

## 2024-05-10 NOTE — TELEPHONE ENCOUNTER
: Aria Leah [id number]    -Cough for 2 months that is not going away.   -Patient feels like she does need to vomit after coughing but is unable to.  Itchy nose, congestion, may be because of weather    Patient has been scheduled per patient request-  Reason for Disposition   Change in color of sputum (e.g., from white to yellow-green sputum)    Additional Information   Negative: SEVERE difficulty breathing (e.g., struggling for each breath, speaks in single words)   Negative: Lips or face are bluish now and persists when not coughing   Negative: Sounds like a life-threatening emergency to the triager   Negative: Chest pain is main symptom   Negative: Cough and < 3 weeks duration   Negative: Previous asthma attacks and this feels like an asthma attack   Negative: MODERATE difficulty breathing (e.g., speaks in phrases, SOB even at rest, pulse 100-120) and still present when not coughing   Negative: Chest pain  (Exception: MILD central chest pain, present only when coughing.)   Negative: Increasing difficulty breathing and always has some difficulty breathing   Negative: Patient sounds very sick or weak to the triager   Negative: MILD difficulty breathing (e.g., minimal/no SOB at rest, SOB with walking, pulse < 100) and still present when not coughing  (Exception: No change from usual, chronic shortness of breath.)   Negative: Coughed up blood and > 1 tablespoon (15 ml)  (Exception: Blood-tinged sputum.)   Negative: Fever > 103 F (39.4 C)   Negative: Fever > 101 F (38.3 C) and age > 60 years   Negative: Fever > 100.0 F (37.8 C) and bedridden (e.g., CVA, chronic illness, recovering from surgery)   Negative: Fever > 100.0 F (37.8 C) and diabetes mellitus or weak immune system (e.g., HIV positive, cancer chemo, splenectomy, organ transplant, chronic steroids)   Negative: SEVERE coughing spells (e.g., whooping sound after coughing, vomiting after coughing)   Negative: Continuous (nonstop) coughing interferes with  "work or school and no improvement using cough treatment per Care Advice   Negative: Fever present > 3 days (72 hours)   Negative: Coughing up aditya-colored sputum    Answer Assessment - Initial Assessment Questions  1. ONSET: \"When did the cough begin?\"       2 months ago.  2. SEVERITY: \"How bad is the cough today?\"       Worse than normal  3. SPUTUM: \"Describe the color of your sputum\" (none, dry cough; clear, white, yellow, green)      Both dry and productive. Phlegm is yellow.  4. HEMOPTYSIS: \"Are you coughing up any blood?\" If so ask: \"How much?\" (flecks, streaks, tablespoons, etc.)      No blood  5. DIFFICULTY BREATHING: \"Are you having difficulty breathing?\" If Yes, ask: \"How bad is it?\" (e.g., mild, moderate, severe)     - MILD: No SOB at rest, mild SOB with walking, speaks normally in sentences, can lie down, no retractions, pulse < 100.     - MODERATE: SOB at rest, SOB with minimal exertion and prefers to sit, cannot lie down flat, speaks in phrases, mild retractions, audible wheezing, pulse 100-120.     - SEVERE: Very SOB at rest, speaks in single words, struggling to breathe, sitting hunched forward, retractions, pulse > 120       Occasional shortness of breath with cough d/t cough being constant  6. FEVER: \"Do you have a fever?\" If Yes, ask: \"What is your temperature, how was it measured, and when did it start?\"      Denies  7. CARDIAC HISTORY: \"Do you have any history of heart disease?\" (e.g., heart attack, congestive heart failure)       Denies  8. LUNG HISTORY: \"Do you have any history of lung disease?\"  (e.g., pulmonary embolus, asthma, emphysema)      Denies  10. OTHER SYMPTOMS: \"Do you have any other symptoms?\" (e.g., runny nose, wheezing, chest pain)        Congestion, throat is itchy, nose is itchy  Denies chest pain  11. PREGNANCY: \"Is there any chance you are pregnant?\" \"When was your last menstrual period?\"        Denies  12. TRAVEL: \"Have you traveled out of the country in the last month?\" " (e.g., travel history, exposures)        Denies    Protocols used: Cough - Chronic-A-[place]

## 2024-05-13 ENCOUNTER — OFFICE VISIT (OUTPATIENT)
Dept: URGENT CARE | Facility: URGENT CARE | Age: 29
End: 2024-05-13
Payer: COMMERCIAL

## 2024-05-13 VITALS
OXYGEN SATURATION: 100 % | HEART RATE: 78 BPM | TEMPERATURE: 98.4 F | SYSTOLIC BLOOD PRESSURE: 130 MMHG | DIASTOLIC BLOOD PRESSURE: 87 MMHG

## 2024-05-13 DIAGNOSIS — J01.90 ACUTE SINUSITIS WITH SYMPTOMS > 10 DAYS: Primary | ICD-10-CM

## 2024-05-13 PROCEDURE — 99213 OFFICE O/P EST LOW 20 MIN: CPT | Performed by: NURSE PRACTITIONER

## 2024-05-13 NOTE — PROGRESS NOTES
Assessment & Plan     Acute sinusitis with symptoms > 10 days  - amoxicillin-clavulanate (AUGMENTIN) 875-125 MG tablet  Dispense: 14 tablet; Refill: 0     Patient Instructions     Augmentin twice a day for 7 days  Push fluids  Lots of handwashing.   Ibuprofen as needed for fever or pain  Delsym or dayquil/nyquil for cough as needed     Rest as able.   Will call if any other labs positive.    F/u in the clinic if symptoms persist or worsen.          Return in about 1 week (around 5/20/2024) for with regular provider if symptoms persist.    Rachelle Chin, PABLO CNP  [facility] KEVIN Schaefer is a 28 year old female who presents to clinic today for the following health issues:  Chief Complaint   Patient presents with    Urgent Care     COUGH x6 weeks     HPI    URI Adult    Onset of symptoms was 6 week(s) ago.  Course of illness is waxing and waning.    Severity moderate  Current and Associated symptoms: chills, runny nose, stuffy nose, cough - non-productive, sore throat, facial pain/pressure, and headache  Treatment measures tried include Tylenol/Ibuprofen, OTC Cough med, and Fluids.  Predisposing factors include ill contact: Family member .      Review of Systems  Constitutional, HEENT, cardiovascular, pulmonary, GI, , musculoskeletal, neuro, skin, endocrine and psych systems are negative, except as otherwise noted.      Objective    /87   Pulse 78   Temp 98.4  F (36.9  C) (Tympanic)   LMP 04/15/2024 (Approximate)   SpO2 100%   Physical Exam   GENERAL: alert and no distress  EYES: Eyes grossly normal to inspection, PERRL and conjunctivae and sclerae normal  HENT: normal cephalic/atraumatic, ear canals and TM's normal, nose and mouth without ulcers or lesions, oropharynx clear, oral mucous membranes moist, and sinuses: maxillary, frontal tenderness on both sides  NECK: no adenopathy, no asymmetry, masses, or scars  RESP: lungs clear to auscultation - no rales,  rhonchi or wheezes  CV: regular rate and rhythm, normal S1 S2, no S3 or S4, no murmur, click or rub, no peripheral edema  ABDOMEN: soft, nontender, no hepatosplenomegaly, no masses and bowel sounds normal  MS: no gross musculoskeletal defects noted, no edema

## 2024-05-13 NOTE — PATIENT INSTRUCTIONS
Augmentin twice a day for 7 days  Push fluids  Lots of handwashing.   Ibuprofen as needed for fever or pain  Delsym or dayquil/nyquil for cough as needed     Rest as able.   Will call if any other labs positive.    F/u in the clinic if symptoms persist or worsen.

## 2024-07-17 ENCOUNTER — ALLIED HEALTH/NURSE VISIT (OUTPATIENT)
Dept: OBGYN | Facility: CLINIC | Age: 29
End: 2024-07-17
Attending: STUDENT IN AN ORGANIZED HEALTH CARE EDUCATION/TRAINING PROGRAM
Payer: COMMERCIAL

## 2024-07-17 DIAGNOSIS — Z23 ENCOUNTER FOR IMMUNIZATION: Primary | ICD-10-CM

## 2024-07-17 PROCEDURE — 90471 IMMUNIZATION ADMIN: CPT | Performed by: STUDENT IN AN ORGANIZED HEALTH CARE EDUCATION/TRAINING PROGRAM

## 2024-07-17 PROCEDURE — 99207 PR NO CHARGE NURSE ONLY: CPT

## 2024-07-17 PROCEDURE — 90651 9VHPV VACCINE 2/3 DOSE IM: CPT | Performed by: STUDENT IN AN ORGANIZED HEALTH CARE EDUCATION/TRAINING PROGRAM

## 2024-10-09 ENCOUNTER — ALLIED HEALTH/NURSE VISIT (OUTPATIENT)
Dept: OBGYN | Facility: CLINIC | Age: 29
End: 2024-10-09
Attending: STUDENT IN AN ORGANIZED HEALTH CARE EDUCATION/TRAINING PROGRAM
Payer: COMMERCIAL

## 2024-10-09 DIAGNOSIS — Z23 NEED FOR HPV VACCINE: Primary | ICD-10-CM

## 2024-10-09 PROCEDURE — 99207 PR NO CHARGE NURSE ONLY: CPT

## 2024-10-09 PROCEDURE — 90651 9VHPV VACCINE 2/3 DOSE IM: CPT

## 2024-10-09 PROCEDURE — 90471 IMMUNIZATION ADMIN: CPT

## 2025-01-20 ENCOUNTER — HOSPITAL ENCOUNTER (EMERGENCY)
Facility: CLINIC | Age: 30
Discharge: HOME | End: 2025-01-20
Attending: EMERGENCY MEDICINE | Admitting: EMERGENCY MEDICINE
Payer: COMMERCIAL

## 2025-01-20 VITALS
RESPIRATION RATE: 18 BRPM | BODY MASS INDEX: 18.25 KG/M2 | TEMPERATURE: 98.8 F | SYSTOLIC BLOOD PRESSURE: 103 MMHG | HEART RATE: 104 BPM | OXYGEN SATURATION: 100 % | WEIGHT: 113.1 LBS | DIASTOLIC BLOOD PRESSURE: 68 MMHG

## 2025-01-20 DIAGNOSIS — S39.92XA INJURY OF LOW BACK, INITIAL ENCOUNTER: ICD-10-CM

## 2025-01-20 DIAGNOSIS — Z33.1 PREGNANT STATE, INCIDENTAL: ICD-10-CM

## 2025-01-20 LAB — HCG UR QL: POSITIVE

## 2025-01-20 PROCEDURE — 81025 URINE PREGNANCY TEST: CPT | Performed by: EMERGENCY MEDICINE

## 2025-01-20 PROCEDURE — 99283 EMERGENCY DEPT VISIT LOW MDM: CPT

## 2025-01-20 PROCEDURE — 250N000013 HC RX MED GY IP 250 OP 250 PS 637: Performed by: EMERGENCY MEDICINE

## 2025-01-20 RX ORDER — OXYCODONE HYDROCHLORIDE 5 MG/1
5 TABLET ORAL ONCE
Status: COMPLETED | OUTPATIENT
Start: 2025-01-20 | End: 2025-01-20

## 2025-01-20 RX ORDER — ACETAMINOPHEN 325 MG/1
325-650 TABLET ORAL EVERY 6 HOURS PRN
Qty: 30 TABLET | Refills: 0 | Status: SHIPPED | OUTPATIENT
Start: 2025-01-20

## 2025-01-20 RX ORDER — ACETAMINOPHEN 500 MG
1000 TABLET ORAL ONCE
Status: COMPLETED | OUTPATIENT
Start: 2025-01-20 | End: 2025-01-20

## 2025-01-20 RX ORDER — OXYCODONE HYDROCHLORIDE 5 MG/1
5 TABLET ORAL EVERY 6 HOURS PRN
Qty: 8 TABLET | Refills: 0 | Status: SHIPPED | OUTPATIENT
Start: 2025-01-20

## 2025-01-20 RX ADMIN — OXYCODONE HYDROCHLORIDE 5 MG: 5 TABLET ORAL at 15:20

## 2025-01-20 RX ADMIN — ACETAMINOPHEN 1000 MG: 500 TABLET, FILM COATED ORAL at 15:20

## 2025-01-20 ASSESSMENT — COLUMBIA-SUICIDE SEVERITY RATING SCALE - C-SSRS
6. HAVE YOU EVER DONE ANYTHING, STARTED TO DO ANYTHING, OR PREPARED TO DO ANYTHING TO END YOUR LIFE?: NO
1. IN THE PAST MONTH, HAVE YOU WISHED YOU WERE DEAD OR WISHED YOU COULD GO TO SLEEP AND NOT WAKE UP?: NO
2. HAVE YOU ACTUALLY HAD ANY THOUGHTS OF KILLING YOURSELF IN THE PAST MONTH?: NO

## 2025-01-20 ASSESSMENT — ACTIVITIES OF DAILY LIVING (ADL): ADLS_ACUITY_SCORE: 42

## 2025-01-20 NOTE — ED PROVIDER NOTES
Emergency Department Note      History of Present Illness     Chief Complaint   Fall and Back Pain      HPI   Olive Lima is a 29 year old female who presents to the ED for evaluation of a fall and lower back pain. An [employer] interpretor was used. The patient reports that today she was going down a flight of stairs at her apartment when she lost her balance and fell onto her back, resulting in pain localized to her lower back with increased severity in the lower left region. She endorses a chance of pregnancy. She denies any head trauma, leg pain, abdominal pain, vaginal bleeding, hematuria, or allergies to medication. Patient mentions her last menstruation was in late December of 2024.     Independent Historian   None    Review of External Notes   None    Past Medical History     Medical History and Problem List   Nexplanon insertion  Hyperemesis of pregnancy  Vaginal delivery    Medications   The patient is currently on no regular medications.    Physical Exam     Patient Vitals for the past 24 hrs:   BP Temp Temp src Pulse Resp SpO2 Weight   01/20/25 1249 103/68 98.8  F (37.1  C) Temporal 104 18 100 % 51.3 kg (113 lb 1.5 oz)     Physical Exam      HEENT:   Conjunctiva are normal and without erythema.    NECK:   Supple, no meningismus.     CV:    Regular rate and rhythm     No murmurs, rubs or gallops.      2+ dorsalis pedis pulses bilateral.  PULM:   Clear to auscultation bilateral.      No respiratory distress.  No stridor.  ABD:   Soft, non-tender, non-distendend.      No rebound or guarding.  MSK:   Minimal tenderness to the lumbar spine   Moderate focal tenderness to the left lumbar paraspinal musculature without overlying ecchymosis or abrasion    No step-off to the bony spine or overlying lesions.   LYMPH:  No cervical lymphadenopathy.  NEURO:  Strength is 5/5 and sensation is intact to the lower extremities bilateral.        Alert    Speech is clear  SKIN:   Warm, dry and intact.    PYSCH:   Mood is  good and affect is appropriate.      Diagnostics     Lab Results   Labs Ordered and Resulted from Time of ED Arrival to Time of ED Departure   HCG QUALITATIVE URINE - Abnormal       Result Value    hCG Urine Qualitative Positive (*)        Imaging   No orders to display     Independent Interpretation   None    ED Course      Medications Administered   Medications   acetaminophen (TYLENOL) tablet 1,000 mg (1,000 mg Oral $Given 1/20/25 1520)   oxyCODONE (ROXICODONE) tablet 5 mg (5 mg Oral $Given 1/20/25 1520)       Procedures   Procedures     Discussion of Management   None    ED Course   ED Course as of 01/20/25 1750   Mon Jan 20, 2025   1511 I obtained history and examined the patient as noted above         Additional Documentation  None    Medical Decision Making / Diagnosis     CMS Diagnoses: None    MIPS       None    MDM   Olive Lima is a 29 year old female presents with fall and subsequent low back pain.  Patient was also concerned that she may be pregnant.  Pregnancy test is positive.  Based on LMP, she is at 4 weeks 3 days.  The majority of her tenderness is to the paraspinal musculature and not to the lumbar spine.  We had a prolonged discussion regarding radiation risk with imaging of the lumbar spine.  Given the overall low suspicion for fracture and early pregnancy, we have opted against imaging and will treat presumptively for soft tissue contusion.  Patient comfortable with the plan.  Limited supply of analgesics provided along with Tylenol.  Close follow-up with PCP and OB/GYN physician.  Patient safe to discharge home.    Disposition   The patient was discharged.     Diagnosis     ICD-10-CM    1. Injury of low back, initial encounter  S39.92XA       2. Pregnant state, incidental  Z33.1            Discharge Medications   Discharge Medication List as of 1/20/2025  3:25 PM        START taking these medications    Details   oxyCODONE (ROXICODONE) 5 MG tablet Take 1 tablet (5 mg) by mouth every 6  hours as needed for severe pain., Disp-8 tablet, R-0, E-Prescribe               Scribe Disclosure:  I, Eulogio Stoner, am serving as a scribe at 3:06 PM on 1/20/2025 to document services personally performed by Robin Giordano MD based on my observations and the provider's statements to me.        Robin Giordano MD  01/20/25 9657     4 = No assist / stand by assistance

## 2025-01-20 NOTE — ED TRIAGE NOTES
Pt slipped and fell onto bottom on stairs. Pt now c/o lower back pain (L>R). No medication taken today. Pt also unsure if she is pregnant. LMP 12/20. ABCs intact. A&OX4.     Triage Assessment (Adult)       Row Name 01/20/25 1250          Triage Assessment    Airway WDL WDL        Respiratory WDL    Respiratory WDL WDL        Skin Circulation/Temperature WDL    Skin Circulation/Temperature WDL WDL        Cardiac WDL    Cardiac WDL WDL        Peripheral/Neurovascular WDL    Peripheral Neurovascular WDL WDL        Cognitive/Neuro/Behavioral WDL    Cognitive/Neuro/Behavioral WDL WDL

## 2025-01-20 NOTE — DISCHARGE INSTRUCTIONS
Please make an appointment to follow up with your primary care provider in 5-7 days even if entirely better.    Discharge Instructions  Back Pain  You were seen today for back pain. Back pain can have many causes, but most will get better without surgery or other specific treatment. Sometimes there is a herniated ( slipped ) disc. We do not usually do MRI scans to look for these right away, since most herniated discs will get better on their own with time.  Today, we did not find any evidence that your back pain was caused by a serious condition. However, sometimes symptoms develop over time and cannot be found during an emergency visit, so it is very important that you follow up with your primary provider.  Generally, every Emergency Department visit should have a follow-up clinic visit with either a primary or a specialty clinic/provider. Please follow-up as instructed by your emergency provider today.    Return to the Emergency Department if:  You develop a fever with your back pain.   You have weakness or change in sensation in one or both legs.  You lose control of your bowels or bladder, or cannot empty your bladder (cannot pee).  Your pain gets much worse.     Follow-up with your provider:  Unless your pain has completely gone away, please make an appointment with your provider within one week. Most of the routine care for back pain is available in a clinic and not the Emergency Department. You may need further management of your back pain, such as more pain medication, imaging such as an X-ray or MRI, or physical therapy.    What can I do to help myself?  Remain Active -- People are often afraid that they will hurt their back further or delay recovery by remaining active, but this is one of the best things you can do for your back. In fact, staying in bed for a long time to rest is not recommended. Studies have shown that people with low back pain recover faster when they remain active. Movement helps to  bring blood flow to the muscles and relieve muscle spasms as well as preventing loss of muscle strength.  Heat -- Using a heating pad can help with low back pain during the first few weeks. Do not sleep with a heating pad, as you can be burned.   Pain medications - You may take a pain medication such as Tylenol  (acetaminophen), Advil , Motrin  (ibuprofen) or Aleve  (naproxen).  If you were given a prescription for medicine here today, be sure to read all of the information (including the package insert) that comes with your prescription.  This will include important information about the medicine, its side effects, and any warnings that you need to know about.  The pharmacist who fills the prescription can provide more information and answer questions you may have about the medicine.  If you have questions or concerns that the pharmacist cannot address, please call or return to the Emergency Department.   Remember that you can always come back to the Emergency Department if you are not able to see your regular provider in the amount of time listed above, if you get any new symptoms, or if there is anything that worries you.

## 2025-01-20 NOTE — Clinical Note
Olive Lima was seen and treated in our emergency department on 1/20/2025.  She may return to work on 01/31/2025.       If you have any questions or concerns, please don't hesitate to call.      Robin Giordano MD

## 2025-01-21 ENCOUNTER — TELEPHONE (OUTPATIENT)
Dept: OBGYN | Facility: CLINIC | Age: 30
End: 2025-01-21
Payer: COMMERCIAL

## 2025-01-21 NOTE — TELEPHONE ENCOUNTER
[facility] Call Center    Phone Message    May a detailed message be left on voicemail: yes     Reason for Call: Other: Patient called to schedule her first prenatal visits. She was seen in the ED yesterday 01/20/25 (med recs in chart) due to slipping down the stairs and falling on her back, causing pain to lower back and left side. Per after visit summary most of patient's pain was to her back muscles, also they opted against imaging due to early pregnancy and low suspicion for fracture. Patient received some pain medications and was told to do a close follow up with PCP and OB. Patient wanted to know if she needed to have a sooner follow up. Writer informed patient that she would be scheduled per protocols and a message would be sent to care team to see if they wanted to see her sooner. Writer scheduled patient for all 4 appts per protocols within scheduling timeframe. Sending TE to clinic in regards to ED visit from yesterday. Please contact patient if needed. Thank you       Action Taken: Other: Women's    Travel Screening: Not Applicable     Date of Service:

## 2025-01-22 NOTE — TELEPHONE ENCOUNTER
LMP: 12/20/24    Pt seen in ED 1/20/25 d/t a fall down the stairs. She landed on butt and pain localized to lower back.     Urine test in ED confirmed pregnancy decided against imaging d/t early gestation.    NPN appts scheduled:     US and lab 2/18  First OB 3/3    Pt wondering if she needs sooner follow-up d/t fall and if it should be with PCP or OB.     Please advise.     Inge Jean RN  [facility]

## 2025-01-22 NOTE — TELEPHONE ENCOUNTER
No specific ob/gyn follow-up needed unless she were to have concerning bleeding. If f/up needed for care after the fall this should be with PCP.  Stu Gardner MD

## 2025-01-25 ENCOUNTER — HOSPITAL ENCOUNTER (EMERGENCY)
Facility: CLINIC | Age: 30
Discharge: HOME | End: 2025-01-25
Attending: EMERGENCY MEDICINE | Admitting: EMERGENCY MEDICINE
Payer: COMMERCIAL

## 2025-01-25 ENCOUNTER — APPOINTMENT (OUTPATIENT)
Dept: GENERAL RADIOLOGY | Facility: CLINIC | Age: 30
End: 2025-01-25
Attending: EMERGENCY MEDICINE
Payer: COMMERCIAL

## 2025-01-25 VITALS
HEIGHT: 65 IN | BODY MASS INDEX: 17.52 KG/M2 | DIASTOLIC BLOOD PRESSURE: 80 MMHG | WEIGHT: 105.16 LBS | TEMPERATURE: 98.4 F | SYSTOLIC BLOOD PRESSURE: 118 MMHG | HEART RATE: 86 BPM | OXYGEN SATURATION: 99 % | RESPIRATION RATE: 16 BRPM

## 2025-01-25 DIAGNOSIS — S30.0XXA LUMBAR CONTUSION, INITIAL ENCOUNTER: ICD-10-CM

## 2025-01-25 LAB
ALBUMIN UR-MCNC: 10 MG/DL
APPEARANCE UR: CLEAR
BACTERIA #/AREA URNS HPF: ABNORMAL /HPF
BILIRUB UR QL STRIP: NEGATIVE
COLOR UR AUTO: YELLOW
GLUCOSE UR STRIP-MCNC: NEGATIVE MG/DL
HGB UR QL STRIP: ABNORMAL
KETONES UR STRIP-MCNC: NEGATIVE MG/DL
LEUKOCYTE ESTERASE UR QL STRIP: ABNORMAL
MUCOUS THREADS #/AREA URNS LPF: PRESENT /LPF
NITRATE UR QL: NEGATIVE
PH UR STRIP: 6 [PH] (ref 5–7)
RBC URINE: 14 /HPF
SP GR UR STRIP: 1.03 (ref 1–1.03)
SQUAMOUS EPITHELIAL: 4 /HPF
UROBILINOGEN UR STRIP-MCNC: NORMAL MG/DL
WBC URINE: 14 /HPF

## 2025-01-25 PROCEDURE — 250N000013 HC RX MED GY IP 250 OP 250 PS 637: Performed by: EMERGENCY MEDICINE

## 2025-01-25 PROCEDURE — 81001 URINALYSIS AUTO W/SCOPE: CPT | Performed by: EMERGENCY MEDICINE

## 2025-01-25 PROCEDURE — 99284 EMERGENCY DEPT VISIT MOD MDM: CPT

## 2025-01-25 PROCEDURE — 87186 SC STD MICRODIL/AGAR DIL: CPT | Performed by: EMERGENCY MEDICINE

## 2025-01-25 PROCEDURE — 72100 X-RAY EXAM L-S SPINE 2/3 VWS: CPT

## 2025-01-25 RX ORDER — ACETAMINOPHEN 325 MG/1
975 TABLET ORAL ONCE
Status: COMPLETED | OUTPATIENT
Start: 2025-01-25 | End: 2025-01-25

## 2025-01-25 RX ORDER — LIDOCAINE 4 G/G
1 PATCH TOPICAL ONCE
Status: DISCONTINUED | OUTPATIENT
Start: 2025-01-25 | End: 2025-01-25 | Stop reason: HOSPADM

## 2025-01-25 RX ADMIN — LIDOCAINE 1 PATCH: 4 PATCH TOPICAL at 13:04

## 2025-01-25 RX ADMIN — ACETAMINOPHEN 975 MG: 325 TABLET ORAL at 11:44

## 2025-01-25 ASSESSMENT — ACTIVITIES OF DAILY LIVING (ADL)
ADLS_ACUITY_SCORE: 42
ADLS_ACUITY_SCORE: 42

## 2025-01-25 NOTE — DISCHARGE INSTRUCTIONS
Return to the ER for worsening pain, inability to walk, numbness or weakness of the legs, or for any new concerns.    Please follow-up with your physician in 2 to 3 days.

## 2025-01-25 NOTE — ED TRIAGE NOTES
Pt complains of left lower back pain. Pt was seen on Monday in ED for same problem and sent home w/ pain meds, but states the pain is still not better. Pt had positive pregnancy test on Monday, but has not been seen by OB yet.

## 2025-01-25 NOTE — Clinical Note
Olive Lima was seen and treated in our emergency department on 1/25/2025.  She may return to work on 02/03/2025.       If you have any questions or concerns, please don't hesitate to call.      Ayden Voss MD

## 2025-01-25 NOTE — ED PROVIDER NOTES
"  Emergency Department Note      History of Present Illness     Chief Complaint   Back Pain      HPI   Olive Lima is a 29 year old female who presents to the ED for evaluation of a fall.  She was seen initially on January 20 for low back pain when she is up walking down some stairs.  She was found to be pregnant.  She is in her first trimester and has not yet established OB care.  She had a long discussion regarding risks of x-ray imaging in first trimester.  Given that her pain was felt to be more musculoskeletal the decision was made to hold off on imaging.  She presents noting significant ongoing pain to the point that she has a hard time getting comfortable and difficulty walking.  There is no radiation of the pain into the legs and no numbness or weakness of the legs.  No bladder or bowel incontinence.  The pain localizes over the left lumbar area at approximate L5 level.  She did not hit her head but at times feels that she has fullness or strange sensation in her ear.  No bleeding or drainage of fluid from the ear.  No headache.  No focal weakness or paresthesias.    History taken through a formal [place] speaking .    Review of External Notes   Prior notes reviewed from January 20, 2025 and the patient was seen following her initial fall.    Past Medical History     Medical History and Problem List   Hyperemesis of pregnancy     Medications   Aviane   Roxicodone   Cholecalciferol     Surgical History   The patient has no pertinent past surgical history.     Physical Exam     Patient Vitals for the past 24 hrs:   BP Temp Temp src Pulse Resp SpO2 Height Weight   01/25/25 1300 118/80 -- -- 86 16 99 % -- --   01/25/25 1107 -- 98.4  F (36.9  C) Oral -- -- -- -- --   01/25/25 1104 122/83 -- -- (!) 110 17 99 % 1.651 m (5' 5\") 47.7 kg (105 lb 2.6 oz)     Physical Exam  Constitutional:       General: She is not in acute distress.     Appearance: Normal appearance. She is not diaphoretic.   HENT:      " Head: Atraumatic.      Mouth/Throat:      Mouth: Mucous membranes are moist.   Eyes:      General: No scleral icterus.     Conjunctiva/sclera: Conjunctivae normal.   Cardiovascular:      Rate and Rhythm: Normal rate and regular rhythm.      Heart sounds: Normal heart sounds.   Pulmonary:      Effort: No respiratory distress.      Breath sounds: Normal breath sounds.   Abdominal:      General: Abdomen is flat. There is no distension.      Tenderness: There is no abdominal tenderness.   Musculoskeletal:      Cervical back: Neck supple.      Comments: No tenderness in the cervical or thoracic spine.  There is tenderness in the low left lumbar paraspinal area on the left.  No midline step-off.  No pelvic instability.   Skin:     General: Skin is warm.      Findings: No rash.   Neurological:      Mental Status: She is alert.      Comments: Speech is fluent.  Strength and sensation lower extremities normal.   Psychiatric:         Mood and Affect: Mood normal.         Behavior: Behavior normal.     History taken through a formal aroma with speaking .      Diagnostics     Lab Results   Labs Ordered and Resulted from Time of ED Arrival to Time of ED Departure   ROUTINE UA WITH MICROSCOPIC REFLEX TO CULTURE - Abnormal       Result Value    Color Urine Yellow      Appearance Urine Clear      Glucose Urine Negative      Bilirubin Urine Negative      Ketones Urine Negative      Specific Gravity Urine 1.030      Blood Urine Small (*)     pH Urine 6.0      Protein Albumin Urine 10 (*)     Urobilinogen Urine Normal      Nitrite Urine Negative      Leukocyte Esterase Urine Small (*)     Bacteria Urine Few (*)     Mucus Urine Present (*)     RBC Urine 14 (*)     WBC Urine 14 (*)     Squamous Epithelials Urine 4 (*)    URINE CULTURE       Imaging   XR Lumbar Spine 2/3 Views   Final Result   IMPRESSION: 5 nonrib-bearing lumbar type vertebral bodies. Mild rightward convex curvature of the lumbar spine. Lumbar spine lordosis  is maintained. Lumbar vertebral body heights are maintained. Intervertebral disc space heights are maintained.            Independent Interpretation   Lumbar x-ray independently interpreted.  No fracture.    ED Course      Medications Administered   Medications   Lidocaine (LIDOCARE) 4 % Patch 1 patch (1 patch Transdermal $Patch/Med Applied 1/25/25 1304)   acetaminophen (TYLENOL) tablet 975 mg (975 mg Oral $Given 1/25/25 1144)       ED Course   ED Course as of 01/25/25 1414   Sat Jan 25, 2025   1135 I obtained history and examined the patient as noted above.        Medical Decision Making / Diagnosis     MDM   Olive Lima is a 29 year old female who presents to the ED with low back pain.  She was here previously after her fall.  It was discussed with the patient that she could have an x-ray at that point but she wanted to hold off.  She returns today with pain that limits her ability to ambulate.  We discussed again at length through an  the potential adverse effects of x-rays during early pregnancy.  We discussed that this needs to be weighed against the potential for a significant missed diagnosis such as fracture or malalignment.  The patient wishes to proceed with a x-ray.  Fortunately there is no fracture seen.    The patient had a Lidoderm patch and Tylenol.  She is resting more comfortably now and was able to ambulate without difficulty.  She is appropriate for ongoing outpatient follow-up through her clinic.    Patient is in her first trimester pregnancy and has made an appointment next month with her obstetrician.  She denies any cramping or vaginal bleeding or other complications related to pregnancy.    Disposition   The patient was discharged.     Diagnosis     ICD-10-CM    1. Lumbar contusion, initial encounter  S30.0XXA                  Scribe Disclosure:  Cristy GARRETT, am serving as a scribe at 11:51 AM on 1/25/2025 to document services personally performed by Ayden Voss  MD Ju[place] based on my observations and the provider's statements to me.        Ayden Voss MD  01/25/25 0658

## 2025-01-26 ENCOUNTER — TELEPHONE (OUTPATIENT)
Dept: NURSING | Facility: CLINIC | Age: 30
End: 2025-01-26
Payer: COMMERCIAL

## 2025-01-26 DIAGNOSIS — N39.0 URINARY TRACT INFECTION: Primary | ICD-10-CM

## 2025-01-26 NOTE — TELEPHONE ENCOUNTER
[facility]    Reason for call: Lab Result Notification     Lab Result (including Rx patient on, if applicable).  If culture, copy of lab report at bottom.  Lab Result: Urine Culture (PRELIMINARY)  1/25/25 No antibiotic prescribed    Recommendation based on Pt assessment, NKDA's, [facility] antibiogram and Preliminary Urine Culture Start:  Cephalexin    Creatinine Level (mg/dl)   Creatinine   Date Value Ref Range Status   04/02/2024 0.65 0.51 - 0.95 mg/dL Final   11/14/2020 0.43 (L) 0.52 - 1.04 mg/dL Final    Creatinine clearance (ml/min), if applicable    Serum creatinine: 0.65 mg/dL 04/02/24 1429  Estimated creatinine clearance: 95.3 mL/min     With [employer]   RN Recommendations/Instructions per [place] ED lab result protocol:   [facility] ED lab result protocol utilized: Urine Culture    1/25/25 Presented to ED with symptoms:  Evaluation of a fall, 1st trimester pregnancy    Patient's current Symptoms at time of telephone encounter:   Pt states sometimes an odor in the urine and sometimes burning with urination.    Pt prefers to wait for Final Culture report.    Patient/care giver notified to contact your PCP clinic or return to the Emergency department if your:  Symptoms return.  Symptoms worsen or other concerning symptoms.        Kiesha Woody RN

## 2025-01-27 LAB — BACTERIA UR CULT: ABNORMAL

## 2025-01-27 RX ORDER — CEPHALEXIN 500 MG/1
500 CAPSULE ORAL 4 TIMES DAILY
Qty: 12 CAPSULE | Refills: 0 | Status: SHIPPED | OUTPATIENT
Start: 2025-01-27 | End: 2025-01-30

## 2025-01-27 NOTE — TELEPHONE ENCOUNTER
[facility]    Reason for call: Lab Result Notification     Lab Result (including Rx patient on, if applicable).  If culture, copy of lab report at bottom.  Lab Result: Urine Culture - See Below    ED Rx: No antibiotic prescribed    Creatinine Level (mg/dl)   Creatinine   Date Value Ref Range Status   04/02/2024 0.65 0.51 - 0.95 mg/dL Final   11/14/2020 0.43 (L) 0.52 - 1.04 mg/dL Final    Creatinine clearance (ml/min), if applicable    Serum creatinine: 0.65 mg/dL 04/02/24 1429  Estimated creatinine clearance: 95.3 mL/min     ED Symptoms: Presented to the ED after a fall. Patient is pregnant.     Current Symptoms: Still having some burning with urination and urine is dark. Denies any new or worsening symptoms.     Allergic to ATBs: No  Breastfeeding: No  Pregnant: Yes  On Coumadin/Warfarin: No  Had any urinary procedures or have urinary   retention, neurogenic bladder, or use a catheter: No    RN Recommendations/Instructions per [place] ED lab result protocol:   [facility] ED lab result protocol utilized: Urine Culture  Instruct to start antibiotic: Cephalexin    Patient/care giver notified to contact your PCP clinic or return to the Emergency department if your:  Symptoms do not improve after 3 days on antibiotic.  Symptoms do not resolve after completing antibiotic.  Symptoms worsen or other concerning symptoms.       OLGA GONZALEZ RN

## 2025-01-30 ENCOUNTER — OFFICE VISIT (OUTPATIENT)
Dept: INTERNAL MEDICINE | Facility: CLINIC | Age: 30
End: 2025-01-30
Payer: COMMERCIAL

## 2025-01-30 VITALS
DIASTOLIC BLOOD PRESSURE: 66 MMHG | BODY MASS INDEX: 18.96 KG/M2 | HEART RATE: 69 BPM | SYSTOLIC BLOOD PRESSURE: 114 MMHG | RESPIRATION RATE: 16 BRPM | WEIGHT: 113.8 LBS | OXYGEN SATURATION: 94 % | HEIGHT: 65 IN | TEMPERATURE: 98.1 F

## 2025-01-30 DIAGNOSIS — Z09 ENCOUNTER FOR EXAMINATION FOLLOWING TREATMENT AT HOSPITAL: Primary | ICD-10-CM

## 2025-01-30 DIAGNOSIS — B96.20 E. COLI UTI: ICD-10-CM

## 2025-01-30 DIAGNOSIS — Z32.01 PREGNANCY TEST POSITIVE: ICD-10-CM

## 2025-01-30 DIAGNOSIS — N39.0 E. COLI UTI: ICD-10-CM

## 2025-01-30 DIAGNOSIS — S30.0XXD LUMBAR CONTUSION, SUBSEQUENT ENCOUNTER: ICD-10-CM

## 2025-01-30 NOTE — PROGRESS NOTES
"Assessment & Plan   Encounter for examination following treatment at hospital  Lumbar contusion, subsequent encounter  Improving, though some pain persists with lifting. Offered PT eval/treat which she was interested in. Referral placed.  - Physical Therapy  Referral; Future    E. coli UTI  Patient reports she will complete her antibiotics later today. Encouraged her to complete full course. Symptoms improved/resolved.    Pregnancy test positive  Establishing with Ob/Gyn next week.    F/u with regular PCP at regular interval or sooner PRN    Signed Electronically by:  Rahul Kebede MD, MPH  [facility]  Internal Medicine    The longitudinal plan of care for the diagnosis(es)/condition(s) as documented were addressed during this visit. Due to the added complexity in care, I will continue to support Olive in the subsequent management and with ongoing continuity of care.    Subjective   Olive is a 29 year old who presents for a next day acute care visit with chief concern of: ER F/U  We utilized a telephone  for today's visit.    HPI   ED/UC Followup:  Facility:  [facility]  Date of visit: 1/20 and 1/25  Reason for visit: Back pain, UTI   Current Status: Feeling a lot better but not completley     Lifting her child can cause sharp pain. Overall is improving though.      Objective    /66   Pulse 69   Temp 98.1  F (36.7  C) (Tympanic)   Resp 16   Ht 1.651 m (5' 5\")   Wt 51.6 kg (113 lb 12.8 oz)   LMP 12/20/2024   SpO2 94%   BMI 18.94 kg/m    Body mass index is 18.94 kg/m .    Physical Exam   GENERAL: alert and in no distress.  EYES: conjunctivae/corneas clear. EOMs grossly intact  HENT: Facies symmetric.  RESP: No iWOB.  MSK: Moves all four extremities freely  SKIN: No significant ulcers, lesions, or rashes on the visualized portions of the skin  NEURO: CN II-XII grossly intact.  "

## 2025-02-03 ENCOUNTER — THERAPY VISIT (OUTPATIENT)
Dept: PHYSICAL THERAPY | Facility: CLINIC | Age: 30
End: 2025-02-03
Attending: INTERNAL MEDICINE
Payer: COMMERCIAL

## 2025-02-03 ENCOUNTER — VIRTUAL VISIT (OUTPATIENT)
Dept: OBGYN | Facility: CLINIC | Age: 30
End: 2025-02-03
Payer: COMMERCIAL

## 2025-02-03 DIAGNOSIS — M54.50 ACUTE BILATERAL LOW BACK PAIN WITHOUT SCIATICA: Primary | ICD-10-CM

## 2025-02-03 DIAGNOSIS — Z34.90 SUPERVISION OF NORMAL PREGNANCY: Primary | ICD-10-CM

## 2025-02-03 DIAGNOSIS — S30.0XXD LUMBAR CONTUSION, SUBSEQUENT ENCOUNTER: ICD-10-CM

## 2025-02-03 PROCEDURE — 97110 THERAPEUTIC EXERCISES: CPT | Mod: GP | Performed by: PHYSICAL THERAPIST

## 2025-02-03 PROCEDURE — 97161 PT EVAL LOW COMPLEX 20 MIN: CPT | Mod: GP | Performed by: PHYSICAL THERAPIST

## 2025-02-03 PROCEDURE — 99207 PR NO CHARGE NURSE ONLY: CPT | Mod: 93

## 2025-02-03 NOTE — PROGRESS NOTES
NPN nurse visit done over the phone. Pt will be given NPN folder and book at her upcoming appt.  Discussed optional screening available to assess chromosomal anomalies. Questions answered. Informed pt of the clinic structure (on-call does deliveries for the day, may be male or female doctor). Pt advised to call the clinic if she has any questions or concerns related to her pregnancy. Prenatal labs will be obtained at her upcoming appt. New prenatal visit scheduled on 3/3/25 with Dr Gardner.      7w1d    Hx of hyperemesis x2 requiring IV infusions    Menstrual cycles: regular  Date of positive pregnancy test:  in ED  Medications stopped upon pos HPT: rx'ed Oxy for lumbar contusion but only took a few times    Last pap: 24        Patient supplied answers from flow sheet for:  Prenatal OB Questionnaire.  Past Medical History  Have you ever recieved care for your mental health? : No  Have you ever been in a major accident or suffered serious trauma?: No  Within the last year, has anyone hit, slapped, kicked or otherwise hurt you?: No  In the last year, has anyone forced you to have sex when you didn't want to?: No    Past Medical History 2   Have you ever received a blood transfusion?: No  Would you accept a blood transfusion if was medically recommended?: Unknown  Does anyone in your home smoke?: No   Is your blood type Rh negative?: No  Have you ever ?: (!) Yes  Have you been hospitalized for a nonsurgical reason excluding normal delivery?: No (hyperemesis with both pregnancies)  Have you ever had an abnormal pap smear?: No    Past Medical History (Continued)  Do you have a history of abnormalities of the uterus?: No  Did your mother take LILIYA or any other hormones when she was pregnant with you?: No  Do you have any other problems we have not asked about which you feel may be important to this pregnancy?: No                   MIKE Sinclair

## 2025-02-03 NOTE — PROGRESS NOTES
PHYSICAL THERAPY EVALUATION  Type of Visit: Evaluation       Fall Risk Screen:  Fall screen completed by: PT  Have you fallen 2 or more times in the past year?: No  Have you fallen and had an injury in the past year?: Yes  Is patient a fall risk?: No  Fall screen comments: Fell on stairs    Subjective   On 1-20-25 she slipped on the stairs at home, landing on her back/buttocks with subsequent L LBP and L buttock pain primarily, and occasional R LBP. She went to the ED and they opted not to do x-rays as she had a positive pregnancy test, she is approximately 7 weeks pregnant. She went back to ED on 1-25-25 and opted for x-rays d/t to her pain not resolving. X-ray negative for fracture. She is worse lying on her back, standing erect, or bending. Better lying on her right side. 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     XR Lumbar Spine 2/3 Views   Final Result   IMPRESSION: 5 nonrib-bearing lumbar type vertebral bodies. Mild rightward convex curvature of the lumbar spine. Lumbar spine lordosis is maintained. Lumbar vertebral body heights are maintained. Intervertebral disc space heights are maintained.           Presenting condition or subjective complaint: lower back  Date of onset: 01/20/25    Relevant medical history:     Dates & types of surgery: none    Prior diagnostic imaging/testing results: X-ray     Prior therapy history for the same diagnosis, illness or injury:        Prior Level of Function  Transfers: Independent  Ambulation: Independent  ADL: Independent  IADL:     Living Environment  Social support: With a significant other or spouse   Type of home: Apartment/condo   Stairs to enter the home: No       Ramp: No   Stairs inside the home: No       Help at home: Medication and/or finances  Equipment owned:       Employment:      Hobbies/Interests:      Patient goals for therapy: i like to be back to normal    Pain assessment: See objective evaluation for additional pain  details     Objective   LUMBAR SPINE EVALUATION  PAIN: Pain Level at Rest: 0-7/10  Pain Level with Use: 7/10  Pain Location:  L>R LB and L buttock  Pain Quality: Dull and Sharp  Pain Frequency: intermittent  Pain is Worst: daytime  Pain is Exacerbated By: lying on her back, bending, standing erect  Pain is Relieved By: lying on R side  Pain Progression: Improved    Gait  Assistive device:  none  Deviation: L lateral lumbar shift, flexed trunk, decreased paola    Posture/alignment  Sitting:  FH, rounded shoulders, decreased lordosis  Standing:  L lateral lumbar shift, flexed trunk, R iliac crest elevated, L shoulder elevated    Lumbar AROM  Flexion:  fingertips to ankles (pain on return)  Extension:  33%, L LBP  Right sideglide:  nil, cannot reach neutral, painful ++  Left sideglide:  50%      Repeated movement testing  Symptoms prior to test movements: L LBP/L buttock pain, R LBP when trying to stand erect  Correction of sitting posture:  not tested    RSGIS to R: initially could not reach neutral d/t pain and stiffness, with reps was able to overcorrect and improve posture but not fully correct shift. Had less pain and improved gait afterward.      Assessment & Plan   CLINICAL IMPRESSIONS  Medical Diagnosis: Lumbar contusion, subsequent encounter    Treatment Diagnosis: LBP   Impression/Assessment: Patient is a 29 year old female with LBP complaints.  The following significant findings have been identified: Pain, Decreased ROM/flexibility, Decreased strength, Impaired gait, Decreased activity tolerance, and Impaired posture. These impairments interfere with their ability to perform self care tasks, work tasks, recreational activities, household chores, driving , household mobility, and community mobility as compared to previous level of function.     Clinical Decision Making (Complexity):  Clinical Presentation: Stable/Uncomplicated  Clinical Presentation Rationale: based on medical and personal factors listed in  PT evaluation  Clinical Decision Making (Complexity): Low complexity    PLAN OF CARE  Treatment Interventions:  Interventions: Manual Therapy, Neuromuscular Re-education, Therapeutic Activity, Therapeutic Exercise, Self-Care/Home Management    Long Term Goals     PT Goal 1  Goal Identifier: gait/ambulation  Goal Description: Patient able to ambulate without gait deviation and with 0/10 pain.  Rationale: to maximize safety and independence within the home;to maximize safety and independence within the community  Goal Progress: new goal  Target Date: 03/31/25  PT Goal 2  Goal Identifier: bending  Goal Description: Unlimited bending with 0/10 pain.  Rationale: to maximize safety and independence with performance of ADLs and functional tasks;to maximize safety and independence within the home;to maximize safety and independence with self cares  Target Date: 03/31/25      Frequency of Treatment: 1x/week  Duration of Treatment: 8 weeks    Recommended Referrals to Other Professionals:   Education Assessment:   Learner/Method: Patient;No Barriers to Learning    Risks and benefits of evaluation/treatment have been explained.   Patient/Family/caregiver agrees with Plan of Care.     Evaluation Time:     PT Eval, Low Complexity Minutes (75087): 20   Present: Yes: Language: Oromo, ID Number/Identifier: in person     Signing Clinician: Maria Victoria Tabor, PT        [facility]                                                                                   OUTPATIENT PHYSICAL THERAPY      PLAN OF TREATMENT FOR OUTPATIENT REHABILITATION   Patient's Last Name, First Name, Olive Johnston YOB: 1995   Provider's Name   [facility]   Medical Record No.  [id number]     Onset Date: 01/20/25  Start of Care Date: 02/03/25     Medical Diagnosis:  Lumbar contusion, subsequent encounter      PT Treatment Diagnosis:  LBP Plan of  Treatment  Frequency/Duration: 1x/week/ 8 weeks    Certification date from 02/03/25 to 03/31/25         See note for plan of treatment details and functional goals     Maria Victoria Tabor, PT                         I CERTIFY THE NEED FOR THESE SERVICES FURNISHED UNDER        THIS PLAN OF TREATMENT AND WHILE UNDER MY CARE     (Physician attestation of this document indicates review and certification of the therapy plan).              Referring Provider:  Rahul Allen    Initial Assessment  See Epic Evaluation- Start of Care Date: 02/03/25

## 2025-02-04 ENCOUNTER — OFFICE VISIT (OUTPATIENT)
Dept: OBGYN | Facility: CLINIC | Age: 30
End: 2025-02-04
Payer: COMMERCIAL

## 2025-02-04 VITALS — WEIGHT: 111 LBS | BODY MASS INDEX: 18.47 KG/M2 | DIASTOLIC BLOOD PRESSURE: 60 MMHG | SYSTOLIC BLOOD PRESSURE: 110 MMHG

## 2025-02-04 DIAGNOSIS — N94.89 VAGINAL BURNING: ICD-10-CM

## 2025-02-04 DIAGNOSIS — R30.0 DYSURIA: Primary | ICD-10-CM

## 2025-02-04 DIAGNOSIS — Z34.80 SUPERVISION OF OTHER NORMAL PREGNANCY, ANTEPARTUM: ICD-10-CM

## 2025-02-04 DIAGNOSIS — B37.31 YEAST INFECTION OF THE VAGINA: Primary | ICD-10-CM

## 2025-02-04 LAB
ALBUMIN UR-MCNC: NEGATIVE MG/DL
AMORPH CRY #/AREA URNS HPF: ABNORMAL /HPF
APPEARANCE UR: ABNORMAL
BACTERIA #/AREA URNS HPF: ABNORMAL /HPF
BILIRUB UR QL STRIP: NEGATIVE
CLUE CELLS: ABNORMAL
COLOR UR AUTO: YELLOW
GLUCOSE UR STRIP-MCNC: NEGATIVE MG/DL
HGB UR QL STRIP: ABNORMAL
KETONES UR STRIP-MCNC: NEGATIVE MG/DL
LEUKOCYTE ESTERASE UR QL STRIP: ABNORMAL
NITRATE UR QL: NEGATIVE
PH UR STRIP: 7 [PH] (ref 5–7)
RBC #/AREA URNS AUTO: ABNORMAL /HPF
SP GR UR STRIP: 1.02 (ref 1–1.03)
SQUAMOUS #/AREA URNS AUTO: ABNORMAL /LPF
TRICHOMONAS, WET PREP: ABNORMAL
UROBILINOGEN UR STRIP-ACNC: 0.2 E.U./DL
WBC #/AREA URNS AUTO: ABNORMAL /HPF
WBC'S/HIGH POWER FIELD, WET PREP: ABNORMAL
YEAST, WET PREP: PRESENT

## 2025-02-04 PROCEDURE — 87186 SC STD MICRODIL/AGAR DIL: CPT | Performed by: OBSTETRICS & GYNECOLOGY

## 2025-02-04 PROCEDURE — 87086 URINE CULTURE/COLONY COUNT: CPT | Performed by: OBSTETRICS & GYNECOLOGY

## 2025-02-04 PROCEDURE — 81001 URINALYSIS AUTO W/SCOPE: CPT | Performed by: OBSTETRICS & GYNECOLOGY

## 2025-02-04 PROCEDURE — 87210 SMEAR WET MOUNT SALINE/INK: CPT | Performed by: OBSTETRICS & GYNECOLOGY

## 2025-02-04 PROCEDURE — 87591 N.GONORRHOEAE DNA AMP PROB: CPT | Performed by: OBSTETRICS & GYNECOLOGY

## 2025-02-04 PROCEDURE — 99459 PELVIC EXAMINATION: CPT | Performed by: OBSTETRICS & GYNECOLOGY

## 2025-02-04 PROCEDURE — 99213 OFFICE O/P EST LOW 20 MIN: CPT | Performed by: OBSTETRICS & GYNECOLOGY

## 2025-02-04 PROCEDURE — 87491 CHLMYD TRACH DNA AMP PROBE: CPT | Performed by: OBSTETRICS & GYNECOLOGY

## 2025-02-04 RX ORDER — VITAMIN A, VITAMIN C, VITAMIN D-3, VITAMIN E, VITAMIN B-1, VITAMIN B-2, NIACIN, VITAMIN B-6, CALCIUM, IRON, ZINC, COPPER 4000; 120; 400; 22; 1.84; 3; 20; 10; 1; 12; 200; 27; 25; 2 [IU]/1; MG/1; [IU]/1; MG/1; MG/1; MG/1; MG/1; MG/1; MG/1; UG/1; MG/1; MG/1; MG/1; MG/1
1 TABLET ORAL DAILY
Qty: 90 TABLET | Refills: 3 | Status: SHIPPED | OUTPATIENT
Start: 2025-02-04

## 2025-02-04 NOTE — PROGRESS NOTES
SUBJECTIVE:                                                     Olive Lima is a 29 year old female  who presents today for urinary symptoms. Pregnant, 7w2d by LMP, scheduled with us for first OB upcoming. See recent ED visit, diagnosed with UTI treated with keflex, but still has some burning.    She felt better after meds, but then symptoms returned immediately. Describes burning with urination, color is different, odor is different.  Vaginal symptoms: no discharge, no itching. This is constant.    Problem list and histories reviewed & adjusted, as indicated.  Additional history: as documented.    Patient Active Problem List   Diagnosis    Acute bilateral low back pain without sciatica     Past Surgical History:   Procedure Laterality Date    NO HISTORY OF SURGERY        Social History     Tobacco Use    Smoking status: Never    Smokeless tobacco: Never   Substance Use Topics    Alcohol use: Never      Problem (# of Occurrences) Relation (Name,Age of Onset)    No Known Problems (6) Mother, Father, Maternal Grandmother, Maternal Grandfather, Paternal Grandmother, Paternal Grandfather           Negative family history of: Breast Cancer              Current Outpatient Medications   Medication Sig Dispense Refill    acetaminophen (TYLENOL) 325 MG tablet Take 1-2 tablets (325-650 mg) by mouth every 6 hours as needed for mild pain. 30 tablet 0    levonorgestrel-ethinyl estradiol (AVIANE) 0.1-20 MG-MCG tablet Take 1 tablet by mouth daily (Patient not taking: Reported on 2025) 84 tablet 4    oxyCODONE (ROXICODONE) 5 MG tablet Take 1 tablet (5 mg) by mouth every 6 hours as needed for severe pain. (Patient not taking: Reported on 2025) 8 tablet 0    Prenatal Vit-Fe Fumarate-FA (PRENATAL MULTIVITAMIN  PLUS IRON) 27-1 MG TABS Take 1 tablet by mouth daily. 90 tablet 3    vitamin D3 (CHOLECALCIFEROL) 1.25 MG (18784 UT) capsule Take 1 capsule (50,000 Units) by mouth every 7 days Do not take while  breastfeeding (Patient not taking: Reported on 2/4/2025) 8 capsule 0     No current facility-administered medications for this visit.     No Known Allergies    ROS:  Negative other than as noted in HPI.    OBJECTIVE:     Exam:  Constitutional:  Appearance: Well nourished, well developed alert, in no acute distress  Neurologic/Psychiatric:  Mental Status:  Oriented X3   Pelvis: External genitalia, Bartholin, urethral, and [place] glands within normal limits. On speculum, exam, small amount of normal appearing discharge noted. Swabs obtained for wet prep, GC/CHLAMYDIA. Cervix looks normal.      In-Clinic Test Results:  No results found for this or any previous visit (from the past 24 hours).    ASSESSMENT/PLAN:                                                        ICD-10-CM    1. Dysuria  R30.0 UA with Microscopic - lab collect     Urine Culture Aerobic Bacterial - lab collect      2. Vaginal burning  N94.89 Wet prep - Clinic Collect     Chlamydia trachomatis/Neisseria gonorrhoeae by PCR - Clinic Collect      3. Supervision of normal pregnancy  Z34.90 Prenatal Vit-Fe Fumarate-FA (PRENATAL MULTIVITAMIN  PLUS IRON) 27-1 MG TABS            -Recheck UA/UC today.  -GC/CHLAMYDIA and wet prep pending, treat as indicated.  -she requests prenatal vitamin, sent under her primary OB.    Carolyn Francis MD  [facility]

## 2025-02-04 NOTE — NURSING NOTE
"Chief Complaint   Patient presents with    UTI     Seen in ED 25. Finished antibiotic . Still c/o burning and color change       Initial /60   Wt 50.3 kg (111 lb)   LMP 12/15/2024   BMI 18.47 kg/m   Estimated body mass index is 18.47 kg/m  as calculated from the following:    Height as of 25: 1.651 m (5' 5\").    Weight as of this encounter: 50.3 kg (111 lb).  BP completed using cuff size: regular    Questioned patient about current smoking habits.  Pt. has never smoked.          The following HM Due: NONE         "

## 2025-02-05 DIAGNOSIS — N39.0 URINARY TRACT INFECTION: Primary | ICD-10-CM

## 2025-02-05 LAB
C TRACH DNA SPEC QL PROBE+SIG AMP: NEGATIVE
N GONORRHOEA DNA SPEC QL NAA+PROBE: NEGATIVE
SPECIMEN TYPE: NORMAL

## 2025-02-05 RX ORDER — NITROFURANTOIN 25; 75 MG/1; MG/1
100 CAPSULE ORAL 2 TIMES DAILY
Qty: 14 CAPSULE | Refills: 0 | Status: SHIPPED | OUTPATIENT
Start: 2025-02-05

## 2025-02-06 LAB — BACTERIA UR CULT: ABNORMAL

## 2025-02-10 ENCOUNTER — THERAPY VISIT (OUTPATIENT)
Dept: PHYSICAL THERAPY | Facility: CLINIC | Age: 30
End: 2025-02-10
Attending: INTERNAL MEDICINE
Payer: COMMERCIAL

## 2025-02-10 DIAGNOSIS — M54.50 ACUTE BILATERAL LOW BACK PAIN WITHOUT SCIATICA: Primary | ICD-10-CM

## 2025-02-10 PROCEDURE — 97110 THERAPEUTIC EXERCISES: CPT | Mod: GP | Performed by: PHYSICAL THERAPIST

## 2025-02-17 ENCOUNTER — THERAPY VISIT (OUTPATIENT)
Dept: PHYSICAL THERAPY | Facility: CLINIC | Age: 30
End: 2025-02-17
Attending: INTERNAL MEDICINE
Payer: COMMERCIAL

## 2025-02-17 DIAGNOSIS — M54.50 ACUTE BILATERAL LOW BACK PAIN WITHOUT SCIATICA: Primary | ICD-10-CM

## 2025-02-17 LAB
ABO + RH BLD: NORMAL
BLD GP AB SCN SERPL QL: NEGATIVE
SPECIMEN EXP DATE BLD: NORMAL

## 2025-02-17 PROCEDURE — 97110 THERAPEUTIC EXERCISES: CPT | Mod: GP | Performed by: PHYSICAL THERAPIST

## 2025-02-17 PROCEDURE — 97530 THERAPEUTIC ACTIVITIES: CPT | Mod: GP | Performed by: PHYSICAL THERAPIST

## 2025-02-18 ENCOUNTER — LAB (OUTPATIENT)
Dept: LAB | Facility: CLINIC | Age: 30
End: 2025-02-18
Payer: COMMERCIAL

## 2025-02-18 ENCOUNTER — ANCILLARY PROCEDURE (OUTPATIENT)
Dept: ULTRASOUND IMAGING | Facility: CLINIC | Age: 30
End: 2025-02-18
Attending: STUDENT IN AN ORGANIZED HEALTH CARE EDUCATION/TRAINING PROGRAM
Payer: COMMERCIAL

## 2025-02-18 DIAGNOSIS — Z34.90 SUPERVISION OF NORMAL PREGNANCY: ICD-10-CM

## 2025-02-18 DIAGNOSIS — Z34.80 SUPERVISION OF OTHER NORMAL PREGNANCY, ANTEPARTUM: Primary | ICD-10-CM

## 2025-02-18 DIAGNOSIS — Z34.80 SUPERVISION OF OTHER NORMAL PREGNANCY, ANTEPARTUM: ICD-10-CM

## 2025-02-18 LAB
ALBUMIN UR-MCNC: NEGATIVE MG/DL
AMORPH CRY #/AREA URNS HPF: ABNORMAL /HPF
APPEARANCE UR: ABNORMAL
BACTERIA #/AREA URNS HPF: ABNORMAL /HPF
BILIRUB UR QL STRIP: NEGATIVE
COLOR UR AUTO: YELLOW
ERYTHROCYTE [DISTWIDTH] IN BLOOD BY AUTOMATED COUNT: 12 % (ref 10–15)
EST. AVERAGE GLUCOSE BLD GHB EST-MCNC: 100 MG/DL
GLUCOSE UR STRIP-MCNC: NEGATIVE MG/DL
HBA1C MFR BLD: 5.1 % (ref 0–5.6)
HBV SURFACE AG SERPL QL IA: NONREACTIVE
HCT VFR BLD AUTO: 35.9 % (ref 35–47)
HCV AB SERPL QL IA: NONREACTIVE
HGB BLD-MCNC: 12.5 G/DL (ref 11.7–15.7)
HGB UR QL STRIP: ABNORMAL
HIV 1+2 AB+HIV1 P24 AG SERPL QL IA: NONREACTIVE
KETONES UR STRIP-MCNC: NEGATIVE MG/DL
LEUKOCYTE ESTERASE UR QL STRIP: NEGATIVE
MCH RBC QN AUTO: 28.2 PG (ref 26.5–33)
MCHC RBC AUTO-ENTMCNC: 34.8 G/DL (ref 31.5–36.5)
MCV RBC AUTO: 81 FL (ref 78–100)
MUCOUS THREADS #/AREA URNS LPF: PRESENT /LPF
NITRATE UR QL: NEGATIVE
PH UR STRIP: 7 [PH] (ref 5–7)
PLATELET # BLD AUTO: 175 10E3/UL (ref 150–450)
RBC # BLD AUTO: 4.43 10E6/UL (ref 3.8–5.2)
RBC #/AREA URNS AUTO: ABNORMAL /HPF
RUBV IGG SERPL QL IA: 4.58 INDEX
RUBV IGG SERPL QL IA: POSITIVE
SP GR UR STRIP: 1.01 (ref 1–1.03)
SQUAMOUS #/AREA URNS AUTO: ABNORMAL /LPF
UROBILINOGEN UR STRIP-ACNC: 0.2 E.U./DL
WBC # BLD AUTO: 4.1 10E3/UL (ref 4–11)
WBC #/AREA URNS AUTO: ABNORMAL /HPF

## 2025-02-18 PROCEDURE — 86850 RBC ANTIBODY SCREEN: CPT

## 2025-02-18 PROCEDURE — 36415 COLL VENOUS BLD VENIPUNCTURE: CPT

## 2025-02-18 PROCEDURE — 87086 URINE CULTURE/COLONY COUNT: CPT

## 2025-02-18 PROCEDURE — 85027 COMPLETE CBC AUTOMATED: CPT

## 2025-02-18 PROCEDURE — 87340 HEPATITIS B SURFACE AG IA: CPT

## 2025-02-18 PROCEDURE — 86762 RUBELLA ANTIBODY: CPT

## 2025-02-18 PROCEDURE — 86803 HEPATITIS C AB TEST: CPT

## 2025-02-18 PROCEDURE — 81001 URINALYSIS AUTO W/SCOPE: CPT

## 2025-02-18 PROCEDURE — 86900 BLOOD TYPING SEROLOGIC ABO: CPT

## 2025-02-18 PROCEDURE — 86901 BLOOD TYPING SEROLOGIC RH(D): CPT

## 2025-02-18 PROCEDURE — 86780 TREPONEMA PALLIDUM: CPT

## 2025-02-18 PROCEDURE — 83036 HEMOGLOBIN GLYCOSYLATED A1C: CPT

## 2025-02-18 PROCEDURE — 87389 HIV-1 AG W/HIV-1&-2 AB AG IA: CPT

## 2025-02-18 PROCEDURE — 76801 OB US < 14 WKS SINGLE FETUS: CPT | Performed by: OBSTETRICS & GYNECOLOGY

## 2025-02-19 LAB — T PALLIDUM AB SER QL: NONREACTIVE

## 2025-02-20 LAB — BACTERIA UR CULT: NO GROWTH

## 2025-02-24 ENCOUNTER — THERAPY VISIT (OUTPATIENT)
Dept: PHYSICAL THERAPY | Facility: CLINIC | Age: 30
End: 2025-02-24
Payer: COMMERCIAL

## 2025-02-24 ENCOUNTER — TELEPHONE (OUTPATIENT)
Dept: INTERNAL MEDICINE | Facility: CLINIC | Age: 30
End: 2025-02-24

## 2025-02-24 DIAGNOSIS — M54.50 ACUTE BILATERAL LOW BACK PAIN WITHOUT SCIATICA: Primary | ICD-10-CM

## 2025-02-24 PROCEDURE — 97530 THERAPEUTIC ACTIVITIES: CPT | Mod: GP | Performed by: PHYSICAL THERAPIST

## 2025-02-24 PROCEDURE — 97110 THERAPEUTIC EXERCISES: CPT | Mod: GP | Performed by: PHYSICAL THERAPIST

## 2025-02-24 NOTE — TELEPHONE ENCOUNTER
Reason for Call:  Other     Detailed comments: pt came [facility] today asking for an work restriction note was told to get one by her physical therapist. Please call pt with any questions.    Phone Number Patient can be reached at: Home number on file [phone] (home)    Best Time: anytime    Can we leave a detailed message on this number? YES    Call taken on 2/24/2025 at 9:59 AM by Alanna Stroud MA

## 2025-02-24 NOTE — LETTER
February 25, 2025      Olive Lima  [address]  [place] [address]        To Whom It May Concern:    Olive Lima continues to work with the relevant care providers as she regains health and function. She may return to work with the following: limited to light duty - lifting no greater than 10 pounds and no bending/stooping. These restrictions should continue until 03/14/2025.      Sincerely,        Signed Electronically by:  Rahul Kebede MD, MPH  [facility]  Internal Medicine

## 2025-02-25 NOTE — TELEPHONE ENCOUNTER
I was contacted by patient's PT directly via [employer]. I have drafted this work restrictions letter and sent it via [employer] to patient.

## 2025-03-03 ENCOUNTER — THERAPY VISIT (OUTPATIENT)
Dept: PHYSICAL THERAPY | Facility: CLINIC | Age: 30
End: 2025-03-03
Payer: COMMERCIAL

## 2025-03-03 ENCOUNTER — PRENATAL OFFICE VISIT (OUTPATIENT)
Dept: OBGYN | Facility: CLINIC | Age: 30
End: 2025-03-03
Payer: COMMERCIAL

## 2025-03-03 VITALS
HEIGHT: 68 IN | WEIGHT: 110 LBS | BODY MASS INDEX: 16.67 KG/M2 | DIASTOLIC BLOOD PRESSURE: 68 MMHG | SYSTOLIC BLOOD PRESSURE: 118 MMHG

## 2025-03-03 DIAGNOSIS — R63.6 UNDERWEIGHT: ICD-10-CM

## 2025-03-03 DIAGNOSIS — O09.299 HISTORY OF IUGR (INTRAUTERINE GROWTH RETARDATION) AND STILLBIRTH, CURRENTLY PREGNANT, UNSPECIFIED TRIMESTER: ICD-10-CM

## 2025-03-03 DIAGNOSIS — R31.9 BLOOD IN URINE: ICD-10-CM

## 2025-03-03 DIAGNOSIS — O20.9 VAGINAL BLEEDING AFFECTING EARLY PREGNANCY: ICD-10-CM

## 2025-03-03 DIAGNOSIS — K59.00 CONSTIPATION DURING PREGNANCY IN SECOND TRIMESTER: ICD-10-CM

## 2025-03-03 DIAGNOSIS — O99.612 CONSTIPATION DURING PREGNANCY IN SECOND TRIMESTER: Primary | ICD-10-CM

## 2025-03-03 DIAGNOSIS — O23.41 URINARY TRACT INFECTION IN MOTHER DURING FIRST TRIMESTER OF PREGNANCY: ICD-10-CM

## 2025-03-03 DIAGNOSIS — O99.612 CONSTIPATION DURING PREGNANCY IN SECOND TRIMESTER: ICD-10-CM

## 2025-03-03 DIAGNOSIS — Z11.8 SCREENING FOR CHLAMYDIAL DISEASE: ICD-10-CM

## 2025-03-03 DIAGNOSIS — Z34.82 ENCOUNTER FOR SUPERVISION OF OTHER NORMAL PREGNANCY IN SECOND TRIMESTER: Primary | ICD-10-CM

## 2025-03-03 DIAGNOSIS — Z34.82 ENCOUNTER FOR SUPERVISION OF OTHER NORMAL PREGNANCY IN SECOND TRIMESTER: ICD-10-CM

## 2025-03-03 DIAGNOSIS — B37.31 CANDIDIASIS OF VAGINA: ICD-10-CM

## 2025-03-03 DIAGNOSIS — K59.00 CONSTIPATION DURING PREGNANCY IN SECOND TRIMESTER: Primary | ICD-10-CM

## 2025-03-03 DIAGNOSIS — M54.50 ACUTE BILATERAL LOW BACK PAIN WITHOUT SCIATICA: Primary | ICD-10-CM

## 2025-03-03 LAB
ALBUMIN UR-MCNC: NEGATIVE MG/DL
APPEARANCE UR: CLEAR
BILIRUB UR QL STRIP: NEGATIVE
C TRACH DNA SPEC QL PROBE+SIG AMP: NEGATIVE
CLUE CELLS: ABNORMAL
COLOR UR AUTO: YELLOW
GLUCOSE UR STRIP-MCNC: NEGATIVE MG/DL
HGB UR QL STRIP: ABNORMAL
KETONES UR STRIP-MCNC: NEGATIVE MG/DL
LEUKOCYTE ESTERASE UR QL STRIP: NEGATIVE
N GONORRHOEA DNA SPEC QL NAA+PROBE: NEGATIVE
NITRATE UR QL: NEGATIVE
PH UR STRIP: 6 [PH] (ref 5–7)
RBC #/AREA URNS AUTO: NORMAL /HPF
SP GR UR STRIP: 1.02 (ref 1–1.03)
SPECIMEN TYPE: NORMAL
TRICHOMONAS, WET PREP: ABNORMAL
UROBILINOGEN UR STRIP-ACNC: 0.2 E.U./DL
WBC #/AREA URNS AUTO: NORMAL /HPF
WBC'S/HIGH POWER FIELD, WET PREP: ABNORMAL
YEAST, WET PREP: PRESENT

## 2025-03-03 PROCEDURE — 3074F SYST BP LT 130 MM HG: CPT | Performed by: STUDENT IN AN ORGANIZED HEALTH CARE EDUCATION/TRAINING PROGRAM

## 2025-03-03 PROCEDURE — 97140 MANUAL THERAPY 1/> REGIONS: CPT | Mod: GP | Performed by: PHYSICAL THERAPIST

## 2025-03-03 PROCEDURE — 0500F INITIAL PRENATAL CARE VISIT: CPT | Performed by: STUDENT IN AN ORGANIZED HEALTH CARE EDUCATION/TRAINING PROGRAM

## 2025-03-03 PROCEDURE — 87491 CHLMYD TRACH DNA AMP PROBE: CPT | Performed by: STUDENT IN AN ORGANIZED HEALTH CARE EDUCATION/TRAINING PROGRAM

## 2025-03-03 PROCEDURE — 3078F DIAST BP <80 MM HG: CPT | Performed by: STUDENT IN AN ORGANIZED HEALTH CARE EDUCATION/TRAINING PROGRAM

## 2025-03-03 PROCEDURE — 99214 OFFICE O/P EST MOD 30 MIN: CPT | Performed by: STUDENT IN AN ORGANIZED HEALTH CARE EDUCATION/TRAINING PROGRAM

## 2025-03-03 PROCEDURE — 87210 SMEAR WET MOUNT SALINE/INK: CPT | Performed by: STUDENT IN AN ORGANIZED HEALTH CARE EDUCATION/TRAINING PROGRAM

## 2025-03-03 PROCEDURE — 87591 N.GONORRHOEAE DNA AMP PROB: CPT | Performed by: STUDENT IN AN ORGANIZED HEALTH CARE EDUCATION/TRAINING PROGRAM

## 2025-03-03 PROCEDURE — 97110 THERAPEUTIC EXERCISES: CPT | Mod: GP | Performed by: PHYSICAL THERAPIST

## 2025-03-03 PROCEDURE — 81001 URINALYSIS AUTO W/SCOPE: CPT | Performed by: STUDENT IN AN ORGANIZED HEALTH CARE EDUCATION/TRAINING PROGRAM

## 2025-03-03 RX ORDER — PRENATAL NO.144/FOLIC ACID 400 MCG
1 TABLET,CHEWABLE ORAL DAILY
Qty: 90 TABLET | Refills: 3 | Status: SHIPPED | OUTPATIENT
Start: 2025-03-03

## 2025-03-03 RX ORDER — POLYETHYLENE GLYCOL 3350 17 G/17G
1 POWDER, FOR SOLUTION ORAL DAILY
Qty: 510 G | Refills: 3 | Status: SHIPPED | OUTPATIENT
Start: 2025-03-03

## 2025-03-03 NOTE — PROGRESS NOTES
[facility]***[facility]  Initial Prenatal    ** ***  used due to language barrier **    HPI:  Olive Lima, is a 29 year old  at 13w0d by *** c/w ***, with Estimated Date of Delivery: Sep 21, 2025. She is here to establish prenatal care. She is here ***alone.    - This pregnancy is ***planned. This is ***good news.  - Patient's last menstrual period was 12/15/2024.   - Menstrual cycles are: ***regular.  - Since her last menstrual period she has had ***no vaginal bleeding.  - FOB: [place]. New FOB: No  - Medications taken since last menstrual period include: ***PNV.  - She is experiencing the following symptoms of pregnancy:   - Nausea: Yes***  - Vomiting: No***  - Fatigue: Yes***  - Breast tenderness: ***Yes  - Frequent urination: ***No  - Other symptoms:   - Constipation: ***Yes  - GERD: ***No  - She denies CTX, LOF, VB.*** +FM.***   - Denies ***HA, vision changes, chest pain, shortness of breath, right upper quadrant pain, increased edema.  - History of chicken pox or varicella vaccine: Yes - ***No    - Constipation - Miralax  - N/V - better than prior, vomitiing twice a day, declines medicaiton. Sometimes does not keep food down. Does keep water down. Yes gaining weight.   - Saw blood in urine this morning - pink color.   - Yeast infection 2/4, did not get treatment   - UTI x2 > if another then treat  - bleeding  - bsus ok  - new us  - CVA pain but no    Factors pertinent to this pregnancy:  - ***    OB Hx:  OB History    Para Term  AB Living   3 2 2 0 0 2   SAB IAB Ectopic Multiple Live Births   0 0 0 0 2      # Outcome Date GA Lbr [place]/2nd Weight Sex Type Anes PTL Lv   3 Current            2 Term 21 41w0d 01:17 / 00:07 3.17 kg (6 lb 15.8 oz) F Vag-Spont  N DYLAN      Name: DEN,FEMALE-OLIVE      Apgar1: 9  Apgar5: 9   1 Term 19 40w1d 04:00 / 01:42 3.22 kg (7 lb 1.6 oz) F Vag-Spont EPI N DYLAN      Name: Milki      Apgar1: 8  Apgar5: 6        G1 - ***    History of:   - PTL: ***No  - PTD: ***No   - PreE or other hypertensive disorder: ***No  - GDM: ***No  - PPH: ***No   - SD: ***No   - 3rd or 4th degree laceration: ***No  - Prior CS: No***    Gyn Hx:  - STI history: ***No  - HSV history - Self: ***No  - HSV history - Partner: ***No  - Last pap: ***  - Hx uterine surgery: ***No    Past Medical Hx:   ***  Past Medical History:   Diagnosis Date    Hyperemesis of pregnancy     Lumbar contusion 01/20/2025    Vaginal delivery 07/02/2021       Problem List:  ***  Patient Active Problem List   Diagnosis    Acute bilateral low back pain without sciatica       Past Surgical Hx: ***  Past Surgical History:   Procedure Laterality Date    NO HISTORY OF SURGERY         Medications: ***  Current Outpatient Medications   Medication Sig Dispense Refill    acetaminophen (TYLENOL) 325 MG tablet Take 1-2 tablets (325-650 mg) by mouth every 6 hours as needed for mild pain. 30 tablet 0    Prenatal Vit-Fe Fumarate-FA (PRENATAL MULTIVITAMIN  PLUS IRON) 27-1 MG TABS Take 1 tablet by mouth daily. 90 tablet 3    miconazole (MICATIN) 100 MG vaginal suppository Place 1 suppository (100 mg) vaginally at bedtime. (Patient not taking: Reported on 3/3/2025) 7 suppository 0    nitroFURantoin macrocrystal-monohydrate (MACROBID) 100 MG capsule Take 1 capsule (100 mg) by mouth 2 times daily. (Patient not taking: Reported on 3/3/2025) 14 capsule 0     No current facility-administered medications for this visit.       Allergies:   ***No Known Allergies    Social Hx:   Marital status:   FOB involved: Yes  Feels safe at home/with partner: Yes  Occupation:  at [employer]  Financial concerns: None  Transportation concerns: None  Tobacco: Denies  Alcohol: Denies  Drugs: Denies    Family Hx:   Family History   Problem Relation Age of Onset    No Known Problems Mother     No Known Problems Father     No Known Problems Maternal Grandmother     No Known Problems Maternal  "Grandfather     No Known Problems Paternal Grandmother     No Known Problems Paternal Grandfather     Breast Cancer No family hx of      PreE Risk Factor Assessment  If 1 HIGH risk factor or 2 MODERATE risk factors, start low dose ASA between 12 and 28 weeks (ideally before 16 weeks) and continue until delivery.    HIGH risk factors:   - Hx PreE, multifetal gestation  - CHTN  - Pregestational T1DM or T2DM  - Kidney disease  - Autoimmune disease    MODERATE risk factors:   - Nulliparity  - Obesity (BMI >30)  - Family history of PreE (mother, sister)  - Socioeconomic characteristics (lower income, black race [proxy for racism])  - Age 35 or older  - Personal history factors (low birth weight or SGA, previous adverse pregnancy outcome, >10-year pregnancy interval)    Qualify for ASA: YES***NO    ROS:  Negative except as per HPI***    Physical Examination:  /68   Ht 1.727 m (5' 8\")   Wt 49.9 kg (110 lb)   LMP 12/15/2024   BMI 16.73 kg/m    Body mass index is 16.73 kg/m .    GEN: Alert, oriented x3, NAD  HEENT: Atraumatic/normocephalic, pupils mid-sized, MMM  NECK: Normal ROM, no thyromegaly or masses  CV: RRR, no murmurs or gallops appreciated, ***peripheral pulses 2+ bilaterally  PULM: Normal work of breathing, chest clear with equal lung sounds bilaterally, no wheezes or crackles  BREAST: Bilaterally without focal masses/lesions; no axillary lymphadenopathy, no expressible nipple discharge  ABD: Soft, non-distended, and non-tender without guarding or rebound, ***BS+  : Normal external female genitalia with normal [place]'s, Bartholin's Gland, and urethral meatus. Vagina is without lesions. ***Physiologic discharge seen. Vaginal walls estrogenized and rugated. Bladder is nontender. Cervix normal appearing, ***parous. On bimanual the uterus is *** week sized, ***anteverted. No cervical motion tenderness. No adnexal masses or tenderness. Pap smear collected***. ***Rectovaginal exam without any lesions. Exam " chaperoned by ***.  BACK: No CVA tenderness***  EXT: Normal extremities, grossly normal ROM, no LE edema***  SKIN: Warm and dry, skin color normal  NEURO: Speech clear, CNs grossly intact, moves all extremities appropriately***  PSYCH: Appropriate affect    FH: ***  FHT: ***    Labs:  ***    Imaging:  ***See imaging tab    A/P:  Olive Lima is a 29 year old  at 13w0d  by *** c/w ***, with Estimated Date of Delivery: Sep 21, 2025.    #Routine OB ***Labs back  - Discussed routine prenatal care and timing of visits. Education provided about health pregnancy habits.  - Discussed pregnancy weight gain goal of: *** lbs. (BMI: ***)  - Rh ***pos // Ab ***neg  - GCT ***wnl (***) ***at 24-28 weeks; A1c ***  - GBS ***neg ***at 36 wks  - Infectious: RI*** // Hep B S Ag neg*** // RPR neg*** // HIV neg*** // Hep C SAb neg*** // GC/CT today*** // VZV immune*** // UA/UC neg***  - Genetics:           - Nuchal Translucency: ***Yes          - Aneuploidy screening: FTS/QUAD/NIPS*** (after 11wks NIPS ideally***)          - Carrier screening: *** (***CF, SMA)          - Hgb electrophoresis: ***          - MSAFP: ***Yes ***16-18 weeks          - Anatomy US at 20-22 weeks  - Preeclampsia risk: ***elevated due to ***. To start ASA-81 at 12-14 weeks  - Placenta ***  - IZ: Flu []  COVID []  Booster [] TDAP []  - Pap: ***Up to date  - Feed: Not discussed***  - BC: Not discussed***    #Back Pain  #UTI x2  #Letter      Return in ***4 weeks for routine prenatal visit.    Gurdeep Gardner MD MPH  [facility]  2025 10:17 AM    ==================================================  ==================================================    2009 IOM Gestational Weight Gain Goals    [place] Pregnancy  - BMI <18.5 (underweight) - Weight gain 28 to 40 lb (12.5 to 18 kg)  - BMI 18.5 to 24.9 (normal weight) - Weight gain 25 to 35 lb (11.5 to 16 kg)  - BMI 25 to 29.9 (overweight) - Weight gain 15 to 25 lb (7 to 11.5 kg)  - BMI >=30  (obese) - Weight gain 11 to 20 lb (5 to 9 kg)    Twin Pregnancy  - BMI <18.5 (underweight) - No recommendations due to insufficient data  - BMI 18.5 to 24.9 (normal weight) - Weight gain 37 to 54 lb (16.8 to 24.5 kg)  - BMI 25 to 29.9 (overweight) - Weight gain 31 to 50 lb (14.1 to 22.7 kg)  - BMI >=30 (obese) - Weight gain 25 to 42 lb (11.4 to 19.1 kg)

## 2025-03-03 NOTE — LETTER
March 3, 2025      Olive Lima  [address]  [place] [address]        To Whom It May Concern:    Olive Lima is a patient at our clinic who is pregnant pregnant and being treated for prenatal care. While pregnant she should not be lifting more than 40 lbs. After 28 weeks or pregnancy she should not lift more than 20 lbs. She should also be able to move/walk around while working and have the ability to sit and rest as needed. She should have easy access to a bathroom, and should not be working on a ladder or on structures above the ground.     Sincerely,          Stu Gardner MD

## 2025-03-03 NOTE — NURSING NOTE
"Chief Complaint   Patient presents with    Prenatal Care     11w1d       Initial /68   Ht 1.727 m (5' 8\")   Wt 49.9 kg (110 lb)   LMP 12/15/2024   BMI 16.73 kg/m   Estimated body mass index is 16.73 kg/m  as calculated from the following:    Height as of this encounter: 1.727 m (5' 8\").    Weight as of this encounter: 49.9 kg (110 lb).  BP completed using cuff size: regular    Questioned patient about current smoking habits.  Pt. has never smoked.          The following HM Due: Candy ()      Maricel Butts CMA on 3/3/2025 at 10:12 AM    "

## 2025-03-04 ENCOUNTER — ANCILLARY PROCEDURE (OUTPATIENT)
Dept: ULTRASOUND IMAGING | Facility: CLINIC | Age: 30
End: 2025-03-04
Attending: STUDENT IN AN ORGANIZED HEALTH CARE EDUCATION/TRAINING PROGRAM
Payer: COMMERCIAL

## 2025-03-04 DIAGNOSIS — K59.00 CONSTIPATION DURING PREGNANCY IN SECOND TRIMESTER: ICD-10-CM

## 2025-03-04 DIAGNOSIS — O99.612 CONSTIPATION DURING PREGNANCY IN SECOND TRIMESTER: ICD-10-CM

## 2025-03-04 DIAGNOSIS — R31.9 BLOOD IN URINE: ICD-10-CM

## 2025-03-04 DIAGNOSIS — Z11.8 SCREENING FOR CHLAMYDIAL DISEASE: ICD-10-CM

## 2025-03-04 PROCEDURE — 76801 OB US < 14 WKS SINGLE FETUS: CPT | Performed by: OBSTETRICS & GYNECOLOGY

## 2025-03-04 PROCEDURE — 76817 TRANSVAGINAL US OBSTETRIC: CPT | Performed by: OBSTETRICS & GYNECOLOGY

## 2025-03-04 RX ORDER — CLOTRIMAZOLE 1 %
1 CREAM WITH APPLICATOR VAGINAL AT BEDTIME
Qty: 1 G | Refills: 0 | Status: SHIPPED | OUTPATIENT
Start: 2025-03-04 | End: 2025-03-11

## 2025-03-19 ENCOUNTER — PRENATAL OFFICE VISIT (OUTPATIENT)
Dept: OBGYN | Facility: CLINIC | Age: 30
End: 2025-03-19
Payer: COMMERCIAL

## 2025-03-19 ENCOUNTER — TELEPHONE (OUTPATIENT)
Dept: OBGYN | Facility: CLINIC | Age: 30
End: 2025-03-19

## 2025-03-19 VITALS — WEIGHT: 114 LBS | SYSTOLIC BLOOD PRESSURE: 120 MMHG | BODY MASS INDEX: 17.33 KG/M2 | DIASTOLIC BLOOD PRESSURE: 66 MMHG

## 2025-03-19 DIAGNOSIS — O46.8X2 SUBCHORIONIC HEMORRHAGE IN SECOND TRIMESTER: ICD-10-CM

## 2025-03-19 DIAGNOSIS — Z34.82 ENCOUNTER FOR SUPERVISION OF OTHER NORMAL PREGNANCY IN SECOND TRIMESTER: ICD-10-CM

## 2025-03-19 DIAGNOSIS — N89.8 VAGINAL ITCHING: ICD-10-CM

## 2025-03-19 DIAGNOSIS — B37.31 CANDIDIASIS OF VAGINA: ICD-10-CM

## 2025-03-19 DIAGNOSIS — O26.852 SPOTTING AFFECTING PREGNANCY IN SECOND TRIMESTER: Primary | ICD-10-CM

## 2025-03-19 LAB
ALBUMIN UR-MCNC: NEGATIVE MG/DL
AMORPH CRY #/AREA URNS HPF: ABNORMAL /HPF
APPEARANCE UR: CLEAR
BACTERIA #/AREA URNS HPF: ABNORMAL /HPF
BACTERIAL VAGINOSIS VAG-IMP: NEGATIVE
BILIRUB UR QL STRIP: NEGATIVE
CANDIDA DNA VAG QL NAA+PROBE: NOT DETECTED
CANDIDA GLABRATA / CANDIDA KRUSEI DNA: DETECTED
COLOR UR AUTO: YELLOW
GLUCOSE UR STRIP-MCNC: NEGATIVE MG/DL
HGB UR QL STRIP: ABNORMAL
KETONES UR STRIP-MCNC: NEGATIVE MG/DL
LEUKOCYTE ESTERASE UR QL STRIP: NEGATIVE
NITRATE UR QL: NEGATIVE
PH UR STRIP: 7.5 [PH] (ref 5–7)
RBC #/AREA URNS AUTO: ABNORMAL /HPF
SP GR UR STRIP: 1.02 (ref 1–1.03)
SQUAMOUS #/AREA URNS AUTO: ABNORMAL /LPF
T VAGINALIS DNA VAG QL NAA+PROBE: NOT DETECTED
UROBILINOGEN UR STRIP-ACNC: 0.2 E.U./DL
WBC #/AREA URNS AUTO: ABNORMAL /HPF

## 2025-03-19 PROCEDURE — 3078F DIAST BP <80 MM HG: CPT | Performed by: STUDENT IN AN ORGANIZED HEALTH CARE EDUCATION/TRAINING PROGRAM

## 2025-03-19 PROCEDURE — 0502F SUBSEQUENT PRENATAL CARE: CPT | Performed by: STUDENT IN AN ORGANIZED HEALTH CARE EDUCATION/TRAINING PROGRAM

## 2025-03-19 PROCEDURE — 81001 URINALYSIS AUTO W/SCOPE: CPT | Performed by: STUDENT IN AN ORGANIZED HEALTH CARE EDUCATION/TRAINING PROGRAM

## 2025-03-19 PROCEDURE — 3074F SYST BP LT 130 MM HG: CPT | Performed by: STUDENT IN AN ORGANIZED HEALTH CARE EDUCATION/TRAINING PROGRAM

## 2025-03-19 PROCEDURE — 99214 OFFICE O/P EST MOD 30 MIN: CPT | Performed by: STUDENT IN AN ORGANIZED HEALTH CARE EDUCATION/TRAINING PROGRAM

## 2025-03-19 PROCEDURE — 81515 NFCT DS BV&VAGINITIS DNA ALG: CPT | Performed by: STUDENT IN AN ORGANIZED HEALTH CARE EDUCATION/TRAINING PROGRAM

## 2025-03-19 NOTE — PROGRESS NOTES
[facility]  Gynecology Office Visit    CC: ***    S:  Olive Lima is a 29 year old  who presents for f/up of ***. She is here ***alone.   See my notes from *** for details.    O:  /66   Wt 51.7 kg (114 lb)   LMP 12/15/2024   BMI 17.33 kg/m      Exam:  GEN: Appears well, NAD  CV: Well perfused  PULM: NWOB  ABD: Soft, non-tender, non-distended  : Normal external genitalia***. Vagina normal***. Cervix normal***. Bimanual with ***no CMT, *** uterus, ***no adnexal masses. Normal discharge, no lesions, no bleeding***. Exam chaperoned by ***  SKIN: Appears normal without rashes or lesions.  EXT: Warm, well perfused, no edema    Labs:  ***    Imaging:  ***    A/P:  Olive Lima is a 29 year old  who presents for f/up of ***.    #***  -     Gurdeep Gardner MD MPH  [facility]  2025 2:19 PM

## 2025-03-19 NOTE — NURSING NOTE
"Chief Complaint   Patient presents with    Prenatal Care     15w2d  Light spotting after finishing yeast medication       Initial /66   Wt 51.7 kg (114 lb)   LMP 12/15/2024   BMI 17.33 kg/m   Estimated body mass index is 17.33 kg/m  as calculated from the following:    Height as of 3/3/25: 1.727 m (5' 8\").    Weight as of this encounter: 51.7 kg (114 lb).  BP completed using cuff size: regular    Questioned patient about current smoking habits.  Pt. has never smoked.          Light spotting started about 3 days ago  Finished yeast infection med last Friday  C/o mucus/blood and burning, denies cramping    Maricel Butts CMA on 3/19/2025 at 2:11 PM    "

## 2025-03-19 NOTE — TELEPHONE ENCOUNTER
Spoke with pt via -     Pt called with concerns of light spotting for past two days.     Last OV 3/3- diagnosed with yeast infection and treated with miconazole vaginal suppository x 7 days.     Pt states she finished course but is still feeling symptoms and has now noticed light spotting.     She is concerned that she may still have infection of that there is something else going on.     Denies cramping, and urinary problems.     Pt stated at last visit you recommended she be seen if she has any more spotting.     Added to schedule at 2:15pm today.     Please advise if other orders or plan of care needed.     Inge Jean RN  [facility]

## 2025-03-20 ENCOUNTER — THERAPY VISIT (OUTPATIENT)
Dept: PHYSICAL THERAPY | Facility: CLINIC | Age: 30
End: 2025-03-20
Payer: COMMERCIAL

## 2025-03-20 ENCOUNTER — ANCILLARY PROCEDURE (OUTPATIENT)
Dept: ULTRASOUND IMAGING | Facility: CLINIC | Age: 30
End: 2025-03-20
Attending: STUDENT IN AN ORGANIZED HEALTH CARE EDUCATION/TRAINING PROGRAM
Payer: COMMERCIAL

## 2025-03-20 DIAGNOSIS — M54.50 ACUTE BILATERAL LOW BACK PAIN WITHOUT SCIATICA: Primary | ICD-10-CM

## 2025-03-20 DIAGNOSIS — O26.852 SPOTTING AFFECTING PREGNANCY IN SECOND TRIMESTER: ICD-10-CM

## 2025-03-20 DIAGNOSIS — Z34.82 ENCOUNTER FOR SUPERVISION OF OTHER NORMAL PREGNANCY IN SECOND TRIMESTER: ICD-10-CM

## 2025-03-20 PROCEDURE — 76805 OB US >/= 14 WKS SNGL FETUS: CPT | Performed by: OBSTETRICS & GYNECOLOGY

## 2025-03-20 NOTE — PROGRESS NOTES
Olive Salinas is overall improving from a significant lumbar lateral shift and referred pain. The work restrictions for 3 weeks were helpful. D/t scheduling issues she was not able to complete the PT visits in the planned timeframe, thus the re-cert to extend the time and add 1 additional visit. Also, she went back to work full duty and had a set back with new sx's of tailbone pain. At today's visit she had excellent improvement and I don't think she needs additional imaging at this time. I would like to have her extend her work restrictions another 2-3 weeks with limited her bending and lifting. Patient states her manager is ok with this and there are light duty options she can perform. Please let me know if you have any concerns.    Thanks so much,  Marlene Tabor PT      [facility]                                                                                   OUTPATIENT PHYSICAL THERAPY    PLAN OF TREATMENT FOR OUTPATIENT REHABILITATION   Patient's Last Name, First Name, M.I.  Olive Lima YOB: 1995   Provider's Name   [facility]   Medical Record No.  [id number]     Onset Date: 01/20/25  Start of Care Date: 02/03/25     Medical Diagnosis:  Lumbar contusion, subsequent encounter      PT Treatment Diagnosis:  LBP Plan of Treatment  Frequency/Duration: 2x/month (d/t patient's schedule)/ 6 weeks    Certification date from 04/01/25 to 05/01/25         See note for plan of treatment details and functional goals     Maria Victoria Tabor, PT                         I CERTIFY THE NEED FOR THESE SERVICES FURNISHED UNDER        THIS PLAN OF TREATMENT AND WHILE UNDER MY CARE     (Physician attestation of this document indicates review and certification of the therapy plan).              Referring Provider:  Rahul Allen    Initial Assessment  See Epic Evaluation- Start of Care Date: 02/03/25        PLAN  Continue therapy per current  plan of care.    Beginning/End Dates of Progress Note Reporting Period:  02/03/25 to 03/20/2025    Referring Provider:  Rahul Allen         03/20/25 0500   Appointment Info   Signing clinician's name / credentials Maria Victoria Tabor PT   Total/Authorized Visits Plan 8   Visits Used 6   Medical Diagnosis Lumbar contusion, subsequent encounter   PT Tx Diagnosis LBP   Other pertinent information ~15 weeks pregnant, good English   Progress Note/Certification   Start of Care Date 02/03/25   Onset of illness/injury or Date of Surgery 01/20/25   Therapy Frequency 2x/month  (d/t patient's schedule)   Predicted Duration 6 weeks   Certification date from 04/01/25   Certification date to 05/01/25   Progress Note Due Date 04/03/25   Progress Note Completed Date 02/03/25       Present No  (Pt felt comfortable to do without, her Engligh is very good. No communication difficulty today.)   GOALS   PT Goals 2   PT Goal 1   Goal Identifier gait/ambulation   Goal Description Patient able to ambulate without gait deviation and with 0/10 pain.   Rationale to maximize safety and independence within the home;to maximize safety and independence within the community   Goal Progress Ambulating with good gait pattern but gets tailbone and lateral left hip pain with walking several hours at work. Extend target date d/t slower than expected progress.   Target Date 05/01/25   PT Goal 2   Goal Identifier bending   Goal Description Unlimited bending with 0/10 pain.   Rationale to maximize safety and independence with performance of ADLs and functional tasks;to maximize safety and independence within the home;to maximize safety and independence with self cares   Goal Progress Can bend until hands reach floor with pain. Gets pain with repeated bending at work. Extend target date d/t slower than exprected progress.   Target Date 05/01/25   Subjective Report   Subjective Report Had been working with restrictions to limit  bending and lifting but went back to full duty and the L LBP and left buttock/lateral hip pain was aggravated and she began to have tailbone pain with prolonged standing, walking and bending at work.   Objective Measures   Objective Measures Objective Measure 1;Objective Measure 2;Objective Measure 3   Objective Measure 1   Objective Measure Posture   Details wnl --R paraspinal hypertonicity is resolved   Objective Measure 2   Objective Measure LAROM   Details flex=hands to floor, ext=40% (moves slowly and guarded with central LBP), TRQ=349%, :LSG=90% starts stiff, improves with reps, ERP   Objective Measure 3   Objective Measure palpation   Details wnl --TTP is resolved   Treatment Interventions (PT)   Interventions Therapeutic Procedure/Exercise;Therapeutic Activity;Manual Therapy   Therapeutic Procedure/Exercise   Therapeutic Procedures: strength, endurance, ROM, flexibility minutes (05788) 25   Therapeutic Procedures Ther Proc 2   Ther Proc 1 Education   Ther Proc 1 - Details again: expected outcomes/timelines, expected exercise progression, how to assess effects   PTRx Ther Proc 1 Standing Sideglide   PTRx Ther Proc 1 - Details x3 sets of 10, moving feet out farther as she gains motion, x10 with PT OP--lumbar ext motion increased with less ;ain and increased ease of movement, increased LSG. Pt to do 1-2 sets of 10 every 2-3 hours with emphasis on end range --at least 5x/day, stop if worse   PTRx Ther Proc 2 Supine Abdominal Exercise #3 (Marching)   PTRx Ther Proc 2 - Details 15 pair 1x/day   PTRx Ther Proc 3 Supine Abdominal Exercise #4 (Leg Extension)   PTRx Ther Proc 3 - Details 15 pair 1x/day   PTRx Ther Proc 4 Bridging #1   PTRx Ther Proc 4 - Details hold for now   Skilled Intervention vc, vsc, mc for form, assessing directional preference motion   Patient Response/Progress increased motion, decreased pain   Therapeutic Activity   Therapeutic Activities: dynamic activities to improve functional performance  minutes (64626) 3   PTRx Ther Act 1 Posture Correction with Lumbar Roll   PTRx Ther Act 1 - Details reviewed   PTRx Ther Act 2 Body Mechanics - [place]   PTRx Ther Act 2 - Details reviewed   PTRx Ther Act 3 Body Mechanics - Golfers Lift   PTRx Ther Act 3 - Details reviewed   Skilled Intervention vc, vsc   Patient Response/Progress pt demonstrates understanding   Manual Therapy   Manual Therapy Manual Therapy 2   Manual Therapy 1 STM   Manual Therapy 1 - Details not today   Manual Therapy 2 mobs   Manual Therapy 2 - Details not today   Education   Learner/Method Patient;No Barriers to Learning   Plan   Home program PTRX HEP   Plan for next session assess effect of RSGIS, increased force?, ext again?, strengthening?   Total Session Time   Timed Code Treatment Minutes 28   Total Treatment Time (sum of timed and untimed services) 28

## 2025-03-23 PROBLEM — O46.8X2 SUBCHORIONIC HEMORRHAGE IN SECOND TRIMESTER: Status: ACTIVE | Noted: 2025-03-23

## 2025-03-24 ENCOUNTER — TELEPHONE (OUTPATIENT)
Dept: OBGYN | Facility: CLINIC | Age: 30
End: 2025-03-24
Payer: COMMERCIAL

## 2025-03-24 DIAGNOSIS — B37.31 YEAST INFECTION OF THE VAGINA: Primary | ICD-10-CM

## 2025-03-24 RX ORDER — FLUCONAZOLE 150 MG/1
150 TABLET ORAL ONCE
Qty: 1 TABLET | Refills: 0 | Status: SHIPPED | OUTPATIENT
Start: 2025-03-24 | End: 2025-03-24

## 2025-03-24 NOTE — TELEPHONE ENCOUNTER
16w0d    LOV 3/19/25    Pt calling back to see if provider reviewed results.     Still having blood tinged discharge. Denies burning or vaginal irritation.     Had US 3/20 and multiplex swab/UA done on 3/19.    Can you review these results and advise if treatment is needed?    Pt did call on Friday but has not heard back regarding results and Dr Gardner is not in clinic today.    Farzana FOURNIER RN  OB/[facility] [place]

## 2025-03-24 NOTE — TELEPHONE ENCOUNTER
Please advise UA normal   Yeast on culture, rx diflucan called in     Dr. Marlene Woody, DO    Obstetrics and Gynecology  [facility]

## 2025-03-25 ENCOUNTER — PRENATAL OFFICE VISIT (OUTPATIENT)
Dept: OBGYN | Facility: CLINIC | Age: 30
End: 2025-03-25
Payer: COMMERCIAL

## 2025-03-25 VITALS
WEIGHT: 114.1 LBS | DIASTOLIC BLOOD PRESSURE: 68 MMHG | SYSTOLIC BLOOD PRESSURE: 110 MMHG | HEIGHT: 68 IN | BODY MASS INDEX: 17.29 KG/M2

## 2025-03-25 DIAGNOSIS — B37.9 CANDIDA GLABRATA INFECTION: ICD-10-CM

## 2025-03-25 DIAGNOSIS — B37.31 YEAST INFECTION OF THE VAGINA: Primary | ICD-10-CM

## 2025-03-25 DIAGNOSIS — Z34.82 ENCOUNTER FOR SUPERVISION OF OTHER NORMAL PREGNANCY IN SECOND TRIMESTER: ICD-10-CM

## 2025-03-25 PROCEDURE — 3078F DIAST BP <80 MM HG: CPT | Performed by: OBSTETRICS & GYNECOLOGY

## 2025-03-25 PROCEDURE — 87102 FUNGUS ISOLATION CULTURE: CPT | Performed by: OBSTETRICS & GYNECOLOGY

## 2025-03-25 PROCEDURE — 99207 PR PRENATAL VISIT: CPT | Performed by: OBSTETRICS & GYNECOLOGY

## 2025-03-25 PROCEDURE — 0502F SUBSEQUENT PRENATAL CARE: CPT | Performed by: OBSTETRICS & GYNECOLOGY

## 2025-03-25 PROCEDURE — 3074F SYST BP LT 130 MM HG: CPT | Performed by: OBSTETRICS & GYNECOLOGY

## 2025-03-25 NOTE — NURSING NOTE
"16w1d    Chief Complaint   Patient presents with    Prenatal Care     initial /68   Ht 1.727 m (5' 8\")   Wt 51.8 kg (114 lb 1.6 oz)   LMP 12/15/2024   BMI 17.35 kg/m   Estimated body mass index is 17.35 kg/m  as calculated from the following:    Height as of this encounter: 1.727 m (5' 8\").    Weight as of this encounter: 51.8 kg (114 lb 1.6 oz).  BP completed using cuff size regular    Kadi Bills CMA on 3/25/2025 at 3:16 PM    "

## 2025-03-25 NOTE — PROGRESS NOTES
29-year-old G 3P2 at 16 weeks 1 day here for follow-up to Dr. Arrieta's visit on 3/19.  Patient was diagnosed with Candida glabrata/krusei on multiplex.  He has prescribed nystatin vaginal suppositories which should be available from the compounding pharmacy in 2 days.    A yeast culture using an E swab was obtained.  Fetal heart tones were present.  The patient does have some dark bloody discharge present.    Follow-up with Dr. Arrieta for ongoing management of vaginal yeast

## 2025-03-27 LAB — BACTERIA VAG AEROBE CULT: ABNORMAL

## 2025-03-28 ENCOUNTER — TELEPHONE (OUTPATIENT)
Dept: OBGYN | Facility: CLINIC | Age: 30
End: 2025-03-28
Payer: COMMERCIAL

## 2025-03-28 NOTE — TELEPHONE ENCOUNTER
Pt called with  to ask if she should begin medication prescribed for yeast infection-      16w4d    Last OV 3/25  Next OV 3/31    Nystatin vaginal suppositories rx'ed on 3/19/25    Pt to use one a night for 14-30 days depending on what cultures show.     Advised pt to begin taking medication tonight and care team will be in contact with her to let her know how long she should take medication.     She stated she start first dose tonight and call back with any other concerns.     Please advise on length of course of medication.     MIKE Sinclair

## 2025-03-29 LAB — BACTERIA VAG AEROBE CULT: ABNORMAL

## 2025-04-10 ENCOUNTER — THERAPY VISIT (OUTPATIENT)
Dept: PHYSICAL THERAPY | Facility: CLINIC | Age: 30
End: 2025-04-10
Payer: COMMERCIAL

## 2025-04-10 DIAGNOSIS — M54.50 ACUTE BILATERAL LOW BACK PAIN WITHOUT SCIATICA: Primary | ICD-10-CM

## 2025-04-14 NOTE — TELEPHONE ENCOUNTER
Culture growing Candida Glabrata.  Recommend she take the nystatin suppository for 30 days.  Please inform patient.   Thanks,  Stu Gardner MD

## 2025-04-21 ENCOUNTER — ANCILLARY PROCEDURE (OUTPATIENT)
Dept: ULTRASOUND IMAGING | Facility: CLINIC | Age: 30
End: 2025-04-21
Attending: STUDENT IN AN ORGANIZED HEALTH CARE EDUCATION/TRAINING PROGRAM
Payer: COMMERCIAL

## 2025-04-21 DIAGNOSIS — Z34.82 ENCOUNTER FOR SUPERVISION OF OTHER NORMAL PREGNANCY IN SECOND TRIMESTER: ICD-10-CM

## 2025-04-21 PROCEDURE — 76816 OB US FOLLOW-UP PER FETUS: CPT | Performed by: OBSTETRICS & GYNECOLOGY

## 2025-05-12 ENCOUNTER — THERAPY VISIT (OUTPATIENT)
Dept: PHYSICAL THERAPY | Facility: CLINIC | Age: 30
End: 2025-05-12
Payer: COMMERCIAL

## 2025-05-12 ENCOUNTER — PRENATAL OFFICE VISIT (OUTPATIENT)
Dept: OBGYN | Facility: CLINIC | Age: 30
End: 2025-05-12
Payer: COMMERCIAL

## 2025-05-12 VITALS — BODY MASS INDEX: 19.31 KG/M2 | SYSTOLIC BLOOD PRESSURE: 120 MMHG | DIASTOLIC BLOOD PRESSURE: 64 MMHG | WEIGHT: 127 LBS

## 2025-05-12 DIAGNOSIS — O46.8X2 SUBCHORIONIC HEMORRHAGE IN SECOND TRIMESTER: ICD-10-CM

## 2025-05-12 DIAGNOSIS — Z34.82 ENCOUNTER FOR SUPERVISION OF OTHER NORMAL PREGNANCY IN SECOND TRIMESTER: ICD-10-CM

## 2025-05-12 DIAGNOSIS — M54.50 ACUTE BILATERAL LOW BACK PAIN WITHOUT SCIATICA: Primary | ICD-10-CM

## 2025-05-12 DIAGNOSIS — B37.9 CANDIDA GLABRATA INFECTION: ICD-10-CM

## 2025-05-12 DIAGNOSIS — O09.299 HISTORY OF IUGR (INTRAUTERINE GROWTH RETARDATION) AND STILLBIRTH, CURRENTLY PREGNANT, UNSPECIFIED TRIMESTER: Primary | ICD-10-CM

## 2025-05-12 PROCEDURE — 0502F SUBSEQUENT PRENATAL CARE: CPT | Performed by: STUDENT IN AN ORGANIZED HEALTH CARE EDUCATION/TRAINING PROGRAM

## 2025-05-12 PROCEDURE — 97110 THERAPEUTIC EXERCISES: CPT | Mod: GP | Performed by: PHYSICAL THERAPIST

## 2025-05-12 PROCEDURE — 99207 PR PRENATAL VISIT: CPT | Performed by: STUDENT IN AN ORGANIZED HEALTH CARE EDUCATION/TRAINING PROGRAM

## 2025-05-12 PROCEDURE — 3074F SYST BP LT 130 MM HG: CPT | Performed by: STUDENT IN AN ORGANIZED HEALTH CARE EDUCATION/TRAINING PROGRAM

## 2025-05-12 PROCEDURE — 3078F DIAST BP <80 MM HG: CPT | Performed by: STUDENT IN AN ORGANIZED HEALTH CARE EDUCATION/TRAINING PROGRAM

## 2025-05-12 NOTE — NURSING NOTE
"Chief Complaint   Patient presents with    Prenatal Care     23w0d     initial /64   Wt 57.6 kg (127 lb)   LMP 12/15/2024   BMI 19.31 kg/m   Estimated body mass index is 19.31 kg/m  as calculated from the following:    Height as of 3/25/25: 1.727 m (5' 8\").    Weight as of this encounter: 57.6 kg (127 lb).  BP completed using cuff size regular      Requesting letter regarding pregnancy to be able to bring her mom here to help with kids  + FM    Maricel Butts CMA on 5/12/2025 at 9:49 AM    "

## 2025-05-12 NOTE — PROGRESS NOTES
[facility]  Return OB Visit    Olive Lima is a 29 year old  who presents at 23w0d for BRITTANY.    - No concerns. Finished Nystatin and no longer having symptoms. No bleeding.  - Wants a letter to support her mother's green card application to come the [place] to help with childcare as she does not have anyone else here.    #Routine OB  - Rh pos // RI  - Genetics: Anatomy wnl  - IZ: Flu []  COVID []  Booster [] TDAP []  - Feed: Not discussed  - BC: Not discussed    #UTI x2 - E Coli x2  and . If another infection start ppx.  #Candida Glabrata T1 - s/p 30 days of Nystatin suppository. Symptoms resolved.   #T1 Vaginal Bleeding/PHIL - No further bleeding  #Hx IUGR - EFW 16% on anatomy US. Was supposed to get L2 but did not. Discussed with MFM, they recommended continuing to get Winston. Will have her schedule for around 24 weeks.    Return to clinic in 4 weeks, sooner if concerns. Reviewed fetal kick counts, PTL, and PreE signs.    Gurdeep Gardner MD MPH  [facility]  2025 10:06 AM

## 2025-05-12 NOTE — PROGRESS NOTES
Dr. Allen,  Patient is progressing overall. Her back pain is much better, but her L LBP and L hip sx's have not completely resolved. Her goals are nearly met. She misunderstood about one of the exercises and did incorrectly. We would like to see her once a month for 1-2 months and then have her continue on her own.    [facility]                                                                                   OUTPATIENT PHYSICAL THERAPY    PLAN OF TREATMENT FOR OUTPATIENT REHABILITATION   Patient's Last Name, First Name, Olive Johnston  LION YOB: 1995   Provider's Name   [facility]   Medical Record No.  [id number]     Onset Date: 01/20/25  Start of Care Date: 02/03/25     Medical Diagnosis:  Lumbar contusion, subsequent encounter      PT Treatment Diagnosis:  LBP Plan of Treatment  Frequency/Duration: 1/month (d/t patient's schedule)/ 2 months    Certification date from 05/02/25 to 07/12/25         See note for plan of treatment details and functional goals     Maria Victoria Tabor PT                         I CERTIFY THE NEED FOR THESE SERVICES FURNISHED UNDER        THIS PLAN OF TREATMENT AND WHILE UNDER MY CARE     (Physician attestation of this document indicates review and certification of the therapy plan).              Referring Provider:  Rahul Allen    Initial Assessment  See Epic Evaluation- Start of Care Date: 02/03/25      PLAN  Continue therapy per current plan of care.    Beginning/End Dates of Progress Note Reporting Period:  03/20/25 to 05/12/2025    Referring Provider:  Rahul Allen       05/12/25 0500   Appointment Info   Signing clinician's name / credentials Maria Victoria Tabor PT   Total/Authorized Visits Plan 8   Visits Used 8   Medical Diagnosis Lumbar contusion, subsequent encounter   PT Tx Diagnosis LBP   Other pertinent information ~23 weeks pregnant, good English   Progress Note/Certification  "  Start of Care Date 02/03/25   Onset of illness/injury or Date of Surgery 01/20/25   Therapy Frequency 1/month  (d/t patient's schedule)   Predicted Duration 2 months   Certification date from 05/02/25   Certification date to 07/12/25   Progress Note Due Date 05/01/25   Progress Note Completed Date 03/20/25       Present No  (Pt felt comfortable to do without, her Engligh is very good. No communication difficulty today.)   GOALS   PT Goals 2   PT Goal 1   Goal Identifier gait/ambulation   Goal Description Patient able to ambulate without gait deviation and with 0/10 pain.   Rationale to maximize safety and independence within the home;to maximize safety and independence within the community   Goal Progress Can walk 15 minutes without gait deviation or pain, but then develops L LBP/L lateral hip pain.   Target Date 07/12/25   PT Goal 2   Goal Identifier bending   Goal Description Unlimited bending with 0/10 pain.   Rationale to maximize safety and independence with performance of ADLs and functional tasks;to maximize safety and independence within the home;to maximize safety and independence with self cares   Goal Progress Unlimited bending with 0-2/10 pain   Target Date 07/12/25   Subjective Report   Subjective Report Overall the back pain is better but the L LBP, L hip, L thigh pain has not fully resolved. Sometimes gets tingling in that area. \"But, I'm feeling better.\" She is 23 weeks pregnant. Has been doing side glides both directions at the wall and stopped REIL d/t pregnancy.   Objective Measures   Objective Measures Objective Measure 1;Objective Measure 2;Objective Measure 3   Objective Measure 1   Objective Measure Posture   Details WNL   Objective Measure 2   Objective Measure LAROM   Details flex=hands to floor, ext=50% (mild L LBP), VFY=962% (min L LBP), :OTZ=118%   Objective Measure 3   Objective Measure palpation   Details wnl --TTP is resolved   Treatment Interventions (PT) "   Interventions Therapeutic Procedure/Exercise;Therapeutic Activity;Manual Therapy   Therapeutic Procedure/Exercise   Therapeutic Procedures: strength, endurance, ROM, flexibility minutes (71071) 35   Therapeutic Procedures Ther Proc 2   Ther Proc 1 Education   PTRx Ther Proc 1 Standing Extension at Counter Supported   PTRx Ther Proc 1 - Details Stared with 0/10 pain in standing and ERP with RSG and ext. (1)SANDER x 10--Produces L and central LBP during/NW after/incr motion but cont to have ERP with RSG. 2nd set of 10--had decreased ERP with RSG after.    PTRx Ther Proc 2 Standing Sideglide   PTRx Ther Proc 2 - Details (2)p28--uunixyng L LBP during/NW after/abolished ERP with RSG after --did a trial of SANDER again x 10--no pain with RSG continued   PTRx Ther Proc 3 Supine Abdominal Exercise #4 (Leg Extension)   PTRx Ther Proc 3 - Details x10 pair 1x/day   PTRx Ther Proc 4 Supine Abdominal Exercise #5 (Arm and Leg Extension)   PTRx Ther Proc 4 - Details x10 pair--corrected form 1x/day   PTRx Ther Proc 5 Bridging #1   PTRx Ther Proc 5 - Details x20 incr to 30 as able if no pain 1x/day   PTRx Ther Proc 6 Knee Bends   PTRx Ther Proc 6 - Details x30 1x/day--cues for hip hinge   PTRx Ther Proc 7 Shoulder Theraband Rows   PTRx Ther Proc 7 - Details GTB x 30 1x/day   PTRx Ther Proc 8 Shoulder Theraband Low Row/Pulldown   PTRx Ther Proc 8 - Details GTB x 30 1x/day   Skilled Intervention vc, vsc, mc --corrected form with side glides and  advised LSG only, changed ext to standing d/t pregnancy, progressed strengthening   Patient Response/Progress demonstrates understanding, abolished ERP with RSG today, tolerated new strengthening well   Therapeutic Activity   PTRx Ther Act 1 Posture Correction with Lumbar Roll   PTRx Ther Act 1 - Details reviewed   PTRx Ther Act 2 Body Mechanics - Waiters [place]   PTRx Ther Act 2 - Details reviewed   PTRx Ther Act 3 Body Mechanics - Golfers Lift   PTRx Ther Act 3 - Details HEP   Skilled  Intervention vc, vsc   Patient Response/Progress pt demonstrates understanding   Education   Learner/Method Patient;No Barriers to Learning   Plan   Home program PTRX HEP   Updates to plan of care see pt once a month for 2 months   Plan for next session check form and response to HEP, d/c SG and focus on ext if ready   Total Session Time   Timed Code Treatment Minutes 35   Total Treatment Time (sum of timed and untimed services) 35

## 2025-05-26 ENCOUNTER — RESULTS FOLLOW-UP (OUTPATIENT)
Dept: OBGYN | Facility: CLINIC | Age: 30
End: 2025-05-26
Payer: COMMERCIAL

## 2025-06-07 ENCOUNTER — HEALTH MAINTENANCE LETTER (OUTPATIENT)
Age: 30
End: 2025-06-07

## 2025-06-11 ENCOUNTER — THERAPY VISIT (OUTPATIENT)
Dept: PHYSICAL THERAPY | Facility: CLINIC | Age: 30
End: 2025-06-11
Payer: COMMERCIAL

## 2025-06-11 DIAGNOSIS — M54.50 ACUTE BILATERAL LOW BACK PAIN WITHOUT SCIATICA: Primary | ICD-10-CM

## 2025-06-11 PROCEDURE — 97110 THERAPEUTIC EXERCISES: CPT | Mod: GP | Performed by: PHYSICAL THERAPIST

## 2025-06-11 NOTE — PROGRESS NOTES
DISCHARGE  Reason for Discharge: Patient independent in HEP, patient to continue HEP and should continue to improve on her own.    Equipment Issued: theraband    Discharge Plan: Patient to continue home program.    Referring Provider:  Rahul Allen       06/11/25 0500   Appointment Info   Signing clinician's name / credentials Maria Victoria Tabor, PT   Total/Authorized Visits Changed to 10, see re-cert.   Visits Used 9   Medical Diagnosis Lumbar contusion, subsequent encounter   PT Tx Diagnosis LBP   Other pertinent information ~27 weeks pregnant, good English   Progress Note/Certification   Start of Care Date 02/03/25   Onset of illness/injury or Date of Surgery 01/20/25   Therapy Frequency 1/month  (d/t patient's schedule)   Predicted Duration 2 months   Certification date from 05/02/25   Certification date to 07/12/25   Progress Note Due Date 07/12/25   Progress Note Completed Date 05/12/25       Present No  (Pt felt comfortable to do without, her Engligh is very good. No communication difficulty today.)   GOALS   PT Goals 2   PT Goal 1   Goal Identifier gait/ambulation   Goal Description Patient able to ambulate without gait deviation and with 0/10 pain.   Rationale to maximize safety and independence within the home;to maximize safety and independence within the community   Goal Progress Ambulates without gait deviation with 0-5/10 pain.   Target Date 07/12/25   PT Goal 2   Goal Identifier bending   Goal Description Unlimited bending with 0/10 pain.   Rationale to maximize safety and independence with performance of ADLs and functional tasks;to maximize safety and independence within the home;to maximize safety and independence with self cares   Goal Progress Unlimited bending with 0-1/10 pain   Target Date 07/12/25   Subjective Report   Subjective Report Overall is better but it is not fully resolved. Can walk as long as she wants, but can have pain from 0-5/10. Bending is nearly  normal.   Objective Measures   Objective Measures Objective Measure 1;Objective Measure 2;Objective Measure 3   Objective Measure 1   Objective Measure Posture   Details WNL   Objective Measure 2   Objective Measure LAROM   Details flex=hands to floor, ext=66% (mild L LBP), GPA=876% (mildL LBP), :KYG=428%   Objective Measure 3   Objective Measure palpation   Details wnl --TTP is resolved   Treatment Interventions (PT)   Interventions Therapeutic Procedure/Exercise;Therapeutic Activity;Manual Therapy   Therapeutic Procedure/Exercise   Therapeutic Procedures: strength, endurance, ROM, flexibility minutes (62841) 30   Therapeutic Procedures Ther Proc 2   Ther Proc 1 Education   PTRx Ther Proc 1 Standing Sideglide   PTRx Ther Proc 1 - Details x2 sets of 10--cues not to rotate hips --no effect pt to focus on extension exercise for now. Can add back in if needed.   PTRx Ther Proc 2 Standing Extension at Counter Supported   PTRx Ther Proc 2 - Details b78--amad motion decreased pain cont x10 5-6x/day until sx's fully resolved   PTRx Ther Proc 3 Side-lying Positional Traction   PTRx Ther Proc 3 - Details x2' --can use at night for pain-relief -- decr pain in clinic   PTRx Ther Proc 4 Supine Abdominal Exercise #5 (Arm and Leg Extension)   PTRx Ther Proc 4 - Details x10 pair--as pregnancy progresses focus on standing exercises 1x/day   PTRx Ther Proc 5 Bridging #1   PTRx Ther Proc 5 - Details x25 1x/day   PTRx Ther Proc 6 Knee Bends   PTRx Ther Proc 6 - Details HEP   PTRx Ther Proc 7 Shoulder Theraband Rows   PTRx Ther Proc 7 - Details GTB x 30 1x/day --cues for posture   PTRx Ther Proc 8 Shoulder Theraband Low Row/Pulldown   PTRx Ther Proc 8 - Details GTB x 30 1x/day --cues to keep elbows straight   PTRx Ther Proc 9 Sit to Stand   PTRx Ther Proc 9 - Details x10 incr to 20 1x/day   Skilled Intervention vc, vsc, mc --corrected form with side glides and  advised LSG only, changed ext to standing d/t pregnancy, progressed  strengthening   Patient Response/Progress demonstrates understanding, abolished ERP with RSG today, tolerated new strengthening well   Therapeutic Activity   Therapeutic Activities: dynamic activities to improve functional performance minutes (73979) 3   PTRx Ther Act 1 Posture Correction with Lumbar Roll   PTRx Ther Act 1 - Details reviewed   PTRx Ther Act 2 Body Mechanics - Waiters [place]   PTRx Ther Act 2 - Details reviewed   PTRx Ther Act 3 Body Mechanics - Golfers Lift   PTRx Ther Act 3 - Details HEP   Skilled Intervention vc, vsc   Patient Response/Progress pt demonstrates understanding   Education   Learner/Method Patient;No Barriers to Learning   Plan   Home program PTRX HEP   Updates to plan of care d/c to HEP   Total Session Time   Timed Code Treatment Minutes 33   Total Treatment Time (sum of timed and untimed services) 33

## 2025-06-17 ENCOUNTER — ANCILLARY PROCEDURE (OUTPATIENT)
Dept: ULTRASOUND IMAGING | Facility: CLINIC | Age: 30
End: 2025-06-17
Attending: STUDENT IN AN ORGANIZED HEALTH CARE EDUCATION/TRAINING PROGRAM
Payer: COMMERCIAL

## 2025-06-17 DIAGNOSIS — Z34.82 ENCOUNTER FOR SUPERVISION OF OTHER NORMAL PREGNANCY IN SECOND TRIMESTER: ICD-10-CM

## 2025-06-17 PROCEDURE — 76816 OB US FOLLOW-UP PER FETUS: CPT | Performed by: OBSTETRICS & GYNECOLOGY

## 2025-06-27 ENCOUNTER — RESULTS FOLLOW-UP (OUTPATIENT)
Dept: OBGYN | Facility: CLINIC | Age: 30
End: 2025-06-27

## 2025-07-07 ENCOUNTER — PRENATAL OFFICE VISIT (OUTPATIENT)
Dept: OBGYN | Facility: CLINIC | Age: 30
End: 2025-07-07
Payer: COMMERCIAL

## 2025-07-07 VITALS
DIASTOLIC BLOOD PRESSURE: 64 MMHG | SYSTOLIC BLOOD PRESSURE: 124 MMHG | WEIGHT: 141.7 LBS | BODY MASS INDEX: 21.47 KG/M2 | HEIGHT: 68 IN

## 2025-07-07 DIAGNOSIS — O09.299 HISTORY OF IUGR (INTRAUTERINE GROWTH RETARDATION) AND STILLBIRTH, CURRENTLY PREGNANT, UNSPECIFIED TRIMESTER: ICD-10-CM

## 2025-07-07 DIAGNOSIS — Z34.83 ENCOUNTER FOR SUPERVISION OF OTHER NORMAL PREGNANCY IN THIRD TRIMESTER: Primary | ICD-10-CM

## 2025-07-07 PROCEDURE — 3078F DIAST BP <80 MM HG: CPT | Performed by: STUDENT IN AN ORGANIZED HEALTH CARE EDUCATION/TRAINING PROGRAM

## 2025-07-07 PROCEDURE — 0502F SUBSEQUENT PRENATAL CARE: CPT | Performed by: STUDENT IN AN ORGANIZED HEALTH CARE EDUCATION/TRAINING PROGRAM

## 2025-07-07 PROCEDURE — 99207 PR PRENATAL VISIT: CPT | Performed by: STUDENT IN AN ORGANIZED HEALTH CARE EDUCATION/TRAINING PROGRAM

## 2025-07-07 PROCEDURE — 3074F SYST BP LT 130 MM HG: CPT | Performed by: STUDENT IN AN ORGANIZED HEALTH CARE EDUCATION/TRAINING PROGRAM

## 2025-07-07 NOTE — LETTER
July 7, 2025      Olive Lima  [address]  [place] [address]        To Whom It May Concern,     Olive Lima is a patient who is under my care for prenatal care. She would like her mother, Clem Hutson, to come to the [place] from [facility] to help with her postpartum recovery and cares, and I support this. Olive has applied for a Green Card for her mother. Please reach out with any questions.        Sincerely,          Stu Gardner MD

## 2025-07-07 NOTE — PROGRESS NOTES
[facility]  Return OB Visit    ** Synap[employer] iPad  used due to language barrier **    Olive Lima is a 29 year old  who presents at 31w0d for BRITTANY.    Doing well today. Here with her 2 daughters.    Reviewed US which shows normal but lagging growth.     #Routine OB  - Rh pos // RI // GCT nl  - Genetics: Anatomy wnl  - IZ: Flu []  COVID []  Booster [] TDAP [x]  - Feed: Not discussed  - BC: Not discussed     #Hx IUGR - EFW 16% on anatomy US > 31% at 24 weeks > 12.8% at 28 weeks. Repeat at 32 weeks; ordered today.     #UTI x2 - E Coli x2  and . If another infection start ppx.  #Candida Glabrata T1 - s/p 30 days of Nystatin suppository. Symptoms resolved.   #T1 Vaginal Bleeding/PHIL - No further bleeding  #Mother in [place] - Patient's mother is in [place]. She has applied for a green card for her over a year ago. Has not heard anything. Would like a letter stating she is pregnant, which was provided today.    Return to clinic in 2 weeks, sooner if concerns. Reviewed fetal kick counts, PTL, and PreE signs.    Gurdeep Gardner MD MPH  [facility]  2025 10:40 AM

## 2025-07-07 NOTE — LETTER
July 7, 2025      Olive Lima  [address]  [place] [address]        To Whom It May Concern:    Olive Lima was seen in our clinic. She may return to work with the following: {work restrictions for clinic work note:761853} on or about ***.      Sincerely,           Electronically signed

## 2025-07-14 ENCOUNTER — ANCILLARY PROCEDURE (OUTPATIENT)
Dept: ULTRASOUND IMAGING | Facility: CLINIC | Age: 30
End: 2025-07-14
Attending: STUDENT IN AN ORGANIZED HEALTH CARE EDUCATION/TRAINING PROGRAM
Payer: COMMERCIAL

## 2025-07-14 DIAGNOSIS — Z34.83 ENCOUNTER FOR SUPERVISION OF OTHER NORMAL PREGNANCY IN THIRD TRIMESTER: ICD-10-CM

## 2025-07-14 PROCEDURE — 76816 OB US FOLLOW-UP PER FETUS: CPT | Performed by: OBSTETRICS & GYNECOLOGY

## 2025-07-14 PROCEDURE — 76819 FETAL BIOPHYS PROFIL W/O NST: CPT | Performed by: OBSTETRICS & GYNECOLOGY

## 2025-07-15 ENCOUNTER — DOCUMENTATION ONLY (OUTPATIENT)
Dept: MATERNAL FETAL MEDICINE | Facility: CLINIC | Age: 30
End: 2025-07-15
Payer: COMMERCIAL

## 2025-07-15 ENCOUNTER — TRANSCRIBE ORDERS (OUTPATIENT)
Dept: MATERNAL FETAL MEDICINE | Facility: CLINIC | Age: 30
End: 2025-07-15
Payer: COMMERCIAL

## 2025-07-15 DIAGNOSIS — O26.90 PREGNANCY RELATED CONDITION, ANTEPARTUM: Primary | ICD-10-CM

## 2025-07-15 DIAGNOSIS — Z34.83 ENCOUNTER FOR SUPERVISION OF OTHER NORMAL PREGNANCY IN THIRD TRIMESTER: Primary | ICD-10-CM

## 2025-07-21 ENCOUNTER — PRE VISIT (OUTPATIENT)
Dept: MATERNAL FETAL MEDICINE | Facility: CLINIC | Age: 30
End: 2025-07-21
Payer: COMMERCIAL

## 2025-07-24 ENCOUNTER — PRENATAL OFFICE VISIT (OUTPATIENT)
Dept: OBGYN | Facility: CLINIC | Age: 30
End: 2025-07-24
Payer: COMMERCIAL

## 2025-07-24 VITALS
DIASTOLIC BLOOD PRESSURE: 70 MMHG | WEIGHT: 143 LBS | SYSTOLIC BLOOD PRESSURE: 120 MMHG | BODY MASS INDEX: 21.67 KG/M2 | HEIGHT: 68 IN

## 2025-07-24 DIAGNOSIS — Z34.83 ENCOUNTER FOR SUPERVISION OF OTHER NORMAL PREGNANCY IN THIRD TRIMESTER: ICD-10-CM

## 2025-07-24 DIAGNOSIS — O09.299 HISTORY OF IUGR (INTRAUTERINE GROWTH RETARDATION) AND STILLBIRTH, CURRENTLY PREGNANT, UNSPECIFIED TRIMESTER: Primary | ICD-10-CM

## 2025-07-24 NOTE — PROGRESS NOTES
[facility]  Return OB Visit    Olive Lima is a 29 year old  who presents at 33w3d for BRITTANY.    Doing well. Here with both daughters.    #Routine OB  - Rh pos // RI // GCT nl  - Genetics: Anatomy wnl  - IZ: Flu []  COVID [x]  Booster [] TDAP [x]  - Feed: Breast  - BC: Previously used Nexplanon (did not like) and COCP. Does not want IUD.     #Hx IUGR   - EFW 16% on anatomy US > 31% at 24 weeks > 12.8% at 28 weeks > 17.2% (AC 6.6%) at 32 weeks.   - Was referred to [facility] on 7/15. Got scheduled for US on  as she declined earlier options.   - BPP ordered today for evaluation of fetal well-being and will be done at [facility]    #UTI x2 - E Coli x2  and . If another infection start ppx.  #Candida Glabrata T1 - s/p 30 days of Nystatin suppository. Symptoms resolved.   #T1 Vaginal Bleeding/PHIL - No further bleeding  #Mother in [place] - Support letter provided for mother coming to [place]. See note from 25.    Return to clinic in 2 weeks, sooner if concerns. Reviewed fetal kick counts, PTL, and PreE signs.    Gurdeep Gardner MD MPH  [facility]  2025 10:32 AM

## 2025-07-24 NOTE — NURSING NOTE
"Chief Complaint   Patient presents with    Prenatal Care     33 3/7 weeks       Initial /70 (BP Location: Right arm, Patient Position: Chair, Cuff Size: Adult Regular)   Ht 1.727 m (5' 8\")   Wt 64.9 kg (143 lb)   LMP 12/15/2024   BMI 21.74 kg/m   Estimated body mass index is 21.74 kg/m  as calculated from the following:    Height as of this encounter: 1.727 m (5' 8\").    Weight as of this encounter: 64.9 kg (143 lb).  BP completed using cuff size: regular    Questioned patient about current smoking habits.  Pt. has never smoked.          The following HM Due: NONE  +fetal ovement  Modesta Rosa, CMA           "

## 2025-07-28 ENCOUNTER — HOSPITAL ENCOUNTER (OUTPATIENT)
Dept: ULTRASOUND IMAGING | Facility: CLINIC | Age: 30
Discharge: HOME | End: 2025-07-28
Attending: OBSTETRICS & GYNECOLOGY
Payer: COMMERCIAL

## 2025-07-28 ENCOUNTER — OFFICE VISIT (OUTPATIENT)
Dept: MATERNAL FETAL MEDICINE | Facility: CLINIC | Age: 30
End: 2025-07-28
Attending: OBSTETRICS & GYNECOLOGY
Payer: COMMERCIAL

## 2025-07-28 DIAGNOSIS — O36.5930 MATERNAL CARE FOR OTHER KNOWN OR SUSPECTED POOR FETAL GROWTH, THIRD TRIMESTER, NOT APPLICABLE OR UNSPECIFIED: Primary | ICD-10-CM

## 2025-07-28 DIAGNOSIS — O26.90 PREGNANCY RELATED CONDITION, ANTEPARTUM: ICD-10-CM

## 2025-07-28 PROCEDURE — 76811 OB US DETAILED SNGL FETUS: CPT

## 2025-07-28 PROCEDURE — 76811 OB US DETAILED SNGL FETUS: CPT | Mod: 26 | Performed by: OBSTETRICS & GYNECOLOGY

## 2025-07-28 NOTE — PROGRESS NOTES
"Please see \"Imaging\" tab under \"Chart Review\" for details of today's visit.    Samantha Suárez    "

## 2025-07-28 NOTE — NURSING NOTE
Olive presents to [facility] for level 2 ultrasound due to FGR on outside scan. [employer]  used for visit via IPAD ID#[id number]. Patient reports positive fetal movement, denies contractions, leaking of fluid, or bleeding.   SBAR given to [facility] MD, see their note in Epic.

## 2025-08-04 ENCOUNTER — PRENATAL OFFICE VISIT (OUTPATIENT)
Dept: OBGYN | Facility: CLINIC | Age: 30
End: 2025-08-04
Payer: COMMERCIAL

## 2025-08-04 VITALS
SYSTOLIC BLOOD PRESSURE: 110 MMHG | BODY MASS INDEX: 22.04 KG/M2 | DIASTOLIC BLOOD PRESSURE: 70 MMHG | WEIGHT: 145.4 LBS | HEIGHT: 68 IN

## 2025-08-04 DIAGNOSIS — Z34.83 ENCOUNTER FOR SUPERVISION OF OTHER NORMAL PREGNANCY IN THIRD TRIMESTER: Primary | ICD-10-CM

## 2025-08-04 DIAGNOSIS — O26.893 PREGNANCY RELATED LEG PAIN IN THIRD TRIMESTER, ANTEPARTUM: ICD-10-CM

## 2025-08-04 DIAGNOSIS — M79.606 PREGNANCY RELATED LEG PAIN IN THIRD TRIMESTER, ANTEPARTUM: ICD-10-CM

## 2025-08-04 DIAGNOSIS — O09.299 HISTORY OF IUGR (INTRAUTERINE GROWTH RETARDATION) AND STILLBIRTH, CURRENTLY PREGNANT, UNSPECIFIED TRIMESTER: ICD-10-CM

## 2025-08-04 PROCEDURE — 0502F SUBSEQUENT PRENATAL CARE: CPT | Performed by: STUDENT IN AN ORGANIZED HEALTH CARE EDUCATION/TRAINING PROGRAM

## 2025-08-04 PROCEDURE — 3078F DIAST BP <80 MM HG: CPT | Performed by: STUDENT IN AN ORGANIZED HEALTH CARE EDUCATION/TRAINING PROGRAM

## 2025-08-04 PROCEDURE — 3074F SYST BP LT 130 MM HG: CPT | Performed by: STUDENT IN AN ORGANIZED HEALTH CARE EDUCATION/TRAINING PROGRAM

## 2025-08-04 PROCEDURE — T1013 SIGN LANG/ORAL INTERPRETER: HCPCS | Mod: U4

## 2025-08-04 PROCEDURE — 99213 OFFICE O/P EST LOW 20 MIN: CPT | Mod: 25 | Performed by: STUDENT IN AN ORGANIZED HEALTH CARE EDUCATION/TRAINING PROGRAM

## 2025-08-04 PROCEDURE — 99207 PR PRENATAL VISIT: CPT | Performed by: STUDENT IN AN ORGANIZED HEALTH CARE EDUCATION/TRAINING PROGRAM

## 2025-08-11 ENCOUNTER — PRENATAL OFFICE VISIT (OUTPATIENT)
Dept: OBGYN | Facility: CLINIC | Age: 30
End: 2025-08-11
Payer: COMMERCIAL

## 2025-08-11 VITALS
HEIGHT: 68 IN | WEIGHT: 145.5 LBS | SYSTOLIC BLOOD PRESSURE: 120 MMHG | DIASTOLIC BLOOD PRESSURE: 72 MMHG | BODY MASS INDEX: 22.05 KG/M2

## 2025-08-11 DIAGNOSIS — M79.606 PREGNANCY RELATED LEG PAIN IN THIRD TRIMESTER, ANTEPARTUM: ICD-10-CM

## 2025-08-11 DIAGNOSIS — O09.299 HISTORY OF IUGR (INTRAUTERINE GROWTH RETARDATION) AND STILLBIRTH, CURRENTLY PREGNANT, UNSPECIFIED TRIMESTER: ICD-10-CM

## 2025-08-11 DIAGNOSIS — Z34.83 ENCOUNTER FOR SUPERVISION OF OTHER NORMAL PREGNANCY IN THIRD TRIMESTER: Primary | ICD-10-CM

## 2025-08-11 DIAGNOSIS — O26.893 PREGNANCY RELATED LEG PAIN IN THIRD TRIMESTER, ANTEPARTUM: ICD-10-CM

## 2025-08-11 PROCEDURE — 87653 STREP B DNA AMP PROBE: CPT | Performed by: STUDENT IN AN ORGANIZED HEALTH CARE EDUCATION/TRAINING PROGRAM

## 2025-08-11 PROCEDURE — 0502F SUBSEQUENT PRENATAL CARE: CPT | Performed by: STUDENT IN AN ORGANIZED HEALTH CARE EDUCATION/TRAINING PROGRAM

## 2025-08-11 PROCEDURE — 99207 PR PRENATAL VISIT: CPT | Performed by: STUDENT IN AN ORGANIZED HEALTH CARE EDUCATION/TRAINING PROGRAM

## 2025-08-11 PROCEDURE — 3074F SYST BP LT 130 MM HG: CPT | Performed by: STUDENT IN AN ORGANIZED HEALTH CARE EDUCATION/TRAINING PROGRAM

## 2025-08-11 PROCEDURE — 3078F DIAST BP <80 MM HG: CPT | Performed by: STUDENT IN AN ORGANIZED HEALTH CARE EDUCATION/TRAINING PROGRAM

## 2025-08-12 LAB — GP B STREP DNA SPEC QL NAA+PROBE: NEGATIVE

## 2025-08-18 ENCOUNTER — HOSPITAL ENCOUNTER (OUTPATIENT)
Dept: ULTRASOUND IMAGING | Facility: CLINIC | Age: 30
Discharge: HOME | End: 2025-08-18
Attending: OBSTETRICS & GYNECOLOGY
Payer: COMMERCIAL

## 2025-08-18 ENCOUNTER — OFFICE VISIT (OUTPATIENT)
Dept: MATERNAL FETAL MEDICINE | Facility: CLINIC | Age: 30
End: 2025-08-18
Attending: OBSTETRICS & GYNECOLOGY
Payer: COMMERCIAL

## 2025-08-18 ENCOUNTER — PRENATAL OFFICE VISIT (OUTPATIENT)
Dept: OBGYN | Facility: CLINIC | Age: 30
End: 2025-08-18
Payer: COMMERCIAL

## 2025-08-18 VITALS
WEIGHT: 145.5 LBS | BODY MASS INDEX: 22.05 KG/M2 | HEIGHT: 68 IN | DIASTOLIC BLOOD PRESSURE: 64 MMHG | SYSTOLIC BLOOD PRESSURE: 120 MMHG

## 2025-08-18 DIAGNOSIS — O36.5930 MATERNAL CARE FOR OTHER KNOWN OR SUSPECTED POOR FETAL GROWTH, THIRD TRIMESTER, NOT APPLICABLE OR UNSPECIFIED: ICD-10-CM

## 2025-08-18 DIAGNOSIS — Z36.2 ENCOUNTER FOR FOLLOW-UP ULTRASOUND OF FETAL ANATOMY: Primary | ICD-10-CM

## 2025-08-18 DIAGNOSIS — Z34.83 ENCOUNTER FOR SUPERVISION OF OTHER NORMAL PREGNANCY IN THIRD TRIMESTER: Primary | ICD-10-CM

## 2025-08-18 PROCEDURE — 76816 OB US FOLLOW-UP PER FETUS: CPT

## 2025-08-18 PROCEDURE — 99207 PR PRENATAL VISIT: CPT | Performed by: STUDENT IN AN ORGANIZED HEALTH CARE EDUCATION/TRAINING PROGRAM

## 2025-08-18 PROCEDURE — 3074F SYST BP LT 130 MM HG: CPT | Performed by: STUDENT IN AN ORGANIZED HEALTH CARE EDUCATION/TRAINING PROGRAM

## 2025-08-18 PROCEDURE — 0502F SUBSEQUENT PRENATAL CARE: CPT | Performed by: STUDENT IN AN ORGANIZED HEALTH CARE EDUCATION/TRAINING PROGRAM

## 2025-08-18 PROCEDURE — 3078F DIAST BP <80 MM HG: CPT | Performed by: STUDENT IN AN ORGANIZED HEALTH CARE EDUCATION/TRAINING PROGRAM

## 2025-08-27 ENCOUNTER — PRENATAL OFFICE VISIT (OUTPATIENT)
Dept: OBGYN | Facility: CLINIC | Age: 30
End: 2025-08-27
Payer: COMMERCIAL

## 2025-08-27 VITALS
BODY MASS INDEX: 22.34 KG/M2 | WEIGHT: 147.4 LBS | SYSTOLIC BLOOD PRESSURE: 120 MMHG | DIASTOLIC BLOOD PRESSURE: 78 MMHG | HEIGHT: 68 IN

## 2025-08-27 DIAGNOSIS — O09.299 HISTORY OF IUGR (INTRAUTERINE GROWTH RETARDATION) AND STILLBIRTH, CURRENTLY PREGNANT, UNSPECIFIED TRIMESTER: ICD-10-CM

## 2025-08-27 DIAGNOSIS — Z34.83 ENCOUNTER FOR SUPERVISION OF OTHER NORMAL PREGNANCY IN THIRD TRIMESTER: Primary | ICD-10-CM

## 2025-08-27 PROCEDURE — 3078F DIAST BP <80 MM HG: CPT | Performed by: STUDENT IN AN ORGANIZED HEALTH CARE EDUCATION/TRAINING PROGRAM

## 2025-08-27 PROCEDURE — 3074F SYST BP LT 130 MM HG: CPT | Performed by: STUDENT IN AN ORGANIZED HEALTH CARE EDUCATION/TRAINING PROGRAM

## 2025-08-27 PROCEDURE — 99207 PR PRENATAL VISIT: CPT | Performed by: STUDENT IN AN ORGANIZED HEALTH CARE EDUCATION/TRAINING PROGRAM

## 2025-08-27 PROCEDURE — 0502F SUBSEQUENT PRENATAL CARE: CPT | Performed by: STUDENT IN AN ORGANIZED HEALTH CARE EDUCATION/TRAINING PROGRAM

## 2025-09-03 ENCOUNTER — PRENATAL OFFICE VISIT (OUTPATIENT)
Dept: OBGYN | Facility: CLINIC | Age: 30
End: 2025-09-03
Payer: MEDICAID

## 2025-09-03 VITALS
HEIGHT: 68 IN | BODY MASS INDEX: 22.43 KG/M2 | DIASTOLIC BLOOD PRESSURE: 80 MMHG | SYSTOLIC BLOOD PRESSURE: 124 MMHG | WEIGHT: 148 LBS

## 2025-09-03 DIAGNOSIS — Z34.83 ENCOUNTER FOR SUPERVISION OF OTHER NORMAL PREGNANCY IN THIRD TRIMESTER: Primary | ICD-10-CM

## 2025-09-03 DIAGNOSIS — O09.299 HISTORY OF IUGR (INTRAUTERINE GROWTH RETARDATION) AND STILLBIRTH, CURRENTLY PREGNANT, UNSPECIFIED TRIMESTER: ICD-10-CM

## 2025-09-03 PROCEDURE — 99207 PR PRENATAL VISIT: CPT | Performed by: STUDENT IN AN ORGANIZED HEALTH CARE EDUCATION/TRAINING PROGRAM

## 2025-09-03 PROCEDURE — 3079F DIAST BP 80-89 MM HG: CPT | Performed by: STUDENT IN AN ORGANIZED HEALTH CARE EDUCATION/TRAINING PROGRAM

## 2025-09-03 PROCEDURE — 0502F SUBSEQUENT PRENATAL CARE: CPT | Performed by: STUDENT IN AN ORGANIZED HEALTH CARE EDUCATION/TRAINING PROGRAM

## 2025-09-03 PROCEDURE — 3074F SYST BP LT 130 MM HG: CPT | Performed by: STUDENT IN AN ORGANIZED HEALTH CARE EDUCATION/TRAINING PROGRAM
